# Patient Record
Sex: FEMALE
[De-identification: names, ages, dates, MRNs, and addresses within clinical notes are randomized per-mention and may not be internally consistent; named-entity substitution may affect disease eponyms.]

---

## 2017-10-02 NOTE — C.PDOC
History Of Present Illness





70 y/o female presents to emergency department, accompanied by daughter, status 

post fall just prior to arrival. Patient reportedly missed 2 steps while 

walking down stairs, falling onto her right side, and striking her head. 

Patient c/o right facial pain with hematoma, right shoulder pain, rib pain, and 

right knee pain. Daughter and patient deny LOC, chest pain, SOB, new weakness, 

new numbness, nausea, vomiting, or other associated symptoms. 





- HPI


Time Seen by Provider: 10/02/17 16:27


Chief Complaint (Nursing): Trauma


History Per: Patient, Family (daughter)


History/Exam Limitations: no limitations


Injury Occurred (Timing): Just Before Arrival


Location Of Injury: Right: Face, Knee, Leg, Shoulder


Recent travel outside of the [place]: No





Past Medical History


Reviewed: Historical Data, Nursing Documentation, Vital Signs


Vital Signs: 


 Last Vital Signs











Temp  97.9 F   10/02/17 19:15


 


Pulse  58 L  10/02/17 19:15


 


Resp  18   10/02/17 19:15


 


BP  131/76   10/02/17 19:15


 


Pulse Ox  99   10/02/17 19:20














- Medical History


PMH: HTN


Other PMH: Kidney Transplant


Family History: States: Unknown Family Hx





- Social History


Hx Alcohol Use: No


Hx Substance Use: No





- Immunization History


Hx Tetanus Toxoid Vaccination: No


Hx Influenza Vaccination: No


Hx Pneumococcal Vaccination: No





Review Of Systems


Except As Marked, All Systems Reviewed And Found Negative.


Constitutional: Negative for: Fever, Chills


ENT: Positive for: Other (right sided facial pain)


Cardiovascular: Negative for: Chest Pain, Palpitations


Respiratory: Negative for: Cough, Shortness of Breath, Wheezing


Gastrointestinal: Negative for: Nausea, Vomiting, Abdominal Pain


Musculoskeletal: Positive for: Shoulder Pain (R), Leg Pain (R), Other (Rib Pain

, R)


Skin: Negative for: Rash


Neurological: Negative for: Weakness, Numbness, Dizziness





Physical Exam





- Physical Exam


Appears: Non-toxic, Other (in moderate painful distress)


Skin: Warm, Dry, Other ((+) 1cm laceration above the right eyebrow)


Head: Normacephalic, Tenderness, Other (Right orbital ecchymosis and swelling)


Eye(s): bilateral: PERRL, EOMI


Ear(s): Bilateral: Normal


Nose: Normal, No Epistaxis, No Deformity


Oral Mucosa: Moist


Tongue: Normal Appearing


Lips: Normal Appearing


Teeth: Normal Dentition, Dentures


Throat: No Erythema, No Exudate


Neck: Paracervical Tenderness, No Step Off Deformity, Supple


Chest: Symmetrical, No Deformity, Tenderness (bilateral anterior 5th and 6th 

rib tenderness)


Cardiovascular: Rhythm Regular, No Friction Rub, No Murmur


Respiratory: Normal Breath Sounds, No Rales, No Rhonchi, No Wheezing


Gastrointestinal/Abdominal: Bowel Sounds (active), Soft, No Tenderness, No 

Distention, No Guarding, No Rebound


Back: Normal Inspection, No Vertebral Tenderness, No Paraspinal Tenderness


Extremity: Capillary Refill (< 2 sec. ), No Deformity, Other (large area of 

ecchymosis, swelling, and tenderness to right medial knee)


Extremity: Bilateral: Pelvis-Stable


Pulses: Left Radial: Normal, Right Radial: Normal, Left Dorsalis Pedis: Normal, 

Right Dorsalis Pedis: Normal


Neurological/Psych: Oriented x3, Normal Speech, Normal Cognition, Normal Motor, 

Normal Sensation


Gait: Steady





ED Course And Treatment


ECG: Interpreted By Me, Viewed By Me


ECG Rhythm: Sinus Rhythm, R BBB


Rate From EC


O2 Sat by Pulse Oximetry: 99 (RA)


Pulse Ox Interpretation: Normal





- CT Scan/US


  ** CT Head


Other Rad Studies (CT/US): Read By Radiologist, Radiology Report Reviewed


CT/US Interpretation: FINDINGS:  HEMORRHAGE:  No intracranial hemorrhage.  BRAIN

:  No mass effect or edema.  Atrophy and chronic microvascular white matter 

ischemic changes are noted.  VENTRICLES:  Unremarkable. No hydrocephalus.  

CALVARIUM:  Unremarkable.  PARANASAL SINUSES:  Unremarkable as visualized. No 

significant inflammatory changes.  MASTOID AIR CELLS:  Unremarkable as 

visualized. No inflammatory changes.  OTHER FINDINGS:  None.  IMPRESSION:  No 

evidence of acute intracranial hemorrhage intracranial collection mass effect 

or midline shift.  Atrophy and chronic microvascular white matter ischemic 

changes noted. No evidence of skull fracture.





  ** CT Maxillofacial


Other Rad Studies (CT/US): Read By Radiologist, Radiology Report Reviewed


CT/US Interpretation: FINDINGS:  NASAL BONES:  Unremarkable.  ORBITS:  Right 

periorbital soft tissue swelling and subcutaneous stranding seen.  PARANASAL 

SINUSES/ MASTOIDS:  Clear.  MAXILLA:  Unremarkable.  MANDIBLE/ 

TEMPOROMANDIBULAR JOINTS:  Unremarkable.  SKULL BASE:  Unremarkable.  TEMPORAL 

BONES:  Middle ears and mastoid grossly unremarkable.  OTHER FINDINGS:  There 

is round high density structure at the skin above the right hilum in the right 

frontal scalp may represent foreign body seen on image 111 series 3.  IMPRESSION

:  No evidence of acute fracture.  Moderate right periorbital soft tissue 

swelling.  2 millimeter high density structure noted at the skin in the right 

frontal scalp above the right orbit may represent foreign body best seen on 

image 111 series 3.





  ** CT Cervical Spine


Other Rad Studies (CT/US): Read By Radiologist, Radiology Report Reviewed


CT/US Interpretation: FINDINGS:  VERTEBRAE:  There is mild compression 

deformity of C3.  There is complete fusion of the C3 and C4. Mild kyphosis seen 

at the upper cervical spine. There is mild anterior spondylolisthesis of C2 

relative to C3. There is mild approximately 3 millimeter anterior 

spondylolisthesis of C7 relative to T1. There is a straightening of the 

cervical spine and mild reversal of the normal lordosis. Diffuse osteopenia is 

also noted.  DISCS/SPINAL CANAL/NEURAL FORAMINA:  Advanced degenerative disc 

changes. Multilevel severe narrowing of the intervertebral disc space noted at 

the cervical spine.  PARASPINAL SOFT TISSUES:  There are bilateral 

retropharyngeal internal carotid arteries noted.  OTHER FINDINGS:  None.  

IMPRESSION:  No definite evidence of acute fracture at the cervical spine.  

Compression deformity and fusion of C3 and C4 noted.  Anterior 

spondylolisthesis of C2 relative to C3.  Reversal of the normal lordosis and 

mild kyphosis seen at the upper cervical spine of uncertain etiology.  Advanced 

degenerative disc changes. If clinically warranted further assessment by MRI 

may be obtained.


Progress Note: CT cervical spine, CT Head, and CT maxillofacial scans ordered. 

Right knee and right shoulder x-rays ordered and reviewed. EKG ordered. Treated 

with Tylenol 650mg PO.  On re-evaluation, patient is resting and notes she is 

still in pain. Patient was given Tramadol.





Laceration





- Laceration Repair


  ** Above right eyebrow


Wound Length (In cm): 1 cm


Description Of Wound: Linear


Wound Cleansed With: Betadine, Sterile Saline


Wound Examination: Irrigated With Saline, No FB With Wound Exploration, No 

Tendon Injury With Wound Exploration


Wound Debridement/Revision: Wound Debrided, Wound Margins Revised


Wound Closure: Steri Strips (2), Skin Glue


Suture Technique And Material Used: Running


Wound Complexity: Simple





Medical Decision Making


Medical Decision Making: 


On re-eval, the patient reports improvement of symptoms. Lungs are CTA, heart 

is RRR. Abdomen is soft, non-tender and the patient is tolerating PO well.  

Patient has walker at home which she will use. Follow up with the medical doctor

/clinic within 1-2 days. Return if worsened. 





Disposition





- Disposition


Referrals: 


Jabier Herbert MD [Staff Provider] - 


Disposition: HOME/ ROUTINE


Disposition Time: 19:14


Condition: FAIR


Additional Instructions: 


Follow up with the medical doctor/clinic within 1-2 days without fail. Return 

if worsened.





Prescriptions: 


Acetaminophen [Tylenol] 325 mg PO Q6 PRN #30 tab


 PRN Reason: Pain, Moderate (4-7)


traMADol [Ultram] 50 mg PO Q6 PRN #20 tab


 PRN Reason: Pain


Instructions:  Cervical Strain (DC), Head Injury (ED), Knee Pain (ED), Rib 

Contusion (ED)


Forms:  [facility] (English)





- Clinical Impression


Clinical Impression: 


 Head injury, Rib contusion, Cervical strain, Knee contusion, Laceration of 

eyebrow








- PA / NP / Resident Statement


MD/DO has reviewed & agrees with the documentation as recorded.





- Scribe Statement


The provider has reviewed the documentation as recorded by the Dioni Gutierrez





All medical record entries made by the Dioni were at my direction and 

personally dictated by me. I have reviewed the chart and agree that the record 

accurately reflects my personal performance of the history, physical exam, 

medical decision making, and the department course for this patient. I have 

also personally directed, reviewed, and agree with the discharge instructions 

and disposition.

## 2017-10-02 NOTE — CT
PROCEDURE:  CT MAXILLOFACIAL BONES WITHOUT CONTRAST



HISTORY:

fall, swelling to R eye



COMPARISON:

None



TECHNIQUE:

Contiguous axial CT  images of the maxillofacial bones were obtained. 

Coronal and sagittal reformats were generated.



Radiation dose:



Total exam DLP = 720.78 mGy-cm.



This CT exam was performed using one or more of the following dose 

reduction techniques: Automated exposure control, adjustment of the 

mA and/or kV according to patient size, and/or use of iterative 

reconstruction technique.



FINDINGS:



NASAL BONES:

Unremarkable.



ORBITS:

Right periorbital soft tissue swelling and subcutaneous stranding 

seen.



PARANASAL SINUSES/ MASTOIDS:

Clear.



MAXILLA:

Unremarkable.



MANDIBLE/ TEMPOROMANDIBULAR JOINTS:

Unremarkable.



SKULL BASE:

Unremarkable.



TEMPORAL BONES:

Middle ears and mastoid grossly unremarkable.



OTHER FINDINGS:

There is round high density structure at the skin above the right 

hilum in the right frontal scalp may represent foreign body seen on 

image 111 series 3



IMPRESSION:

No evidence of acute fracture.



Moderate right periorbital soft tissue swelling.



2 millimeter high density structure noted at the skin in the right 

frontal scalp above the right orbit may represent foreign body best 

seen on image 111 series 3.

## 2017-10-02 NOTE — CT
PROCEDURE:  CT HEAD WITHOUT CONTRAST.



HISTORY:

fall , head injury



COMPARISON:

Comparison is made to the previous study dated 11/16/2012. 



TECHNIQUE:

Axial computed tomography images were obtained through the head/brain 

without intravenous contrast.  



Radiation dose:



Total exam DLP = 863.77 mGy-cm.



This CT exam was performed using one or more of the following dose 

reduction techniques: Automated exposure control, adjustment of the 

mA and/or kV according to patient size, and/or use of iterative 

reconstruction technique.



FINDINGS:



HEMORRHAGE:

No intracranial hemorrhage. 



BRAIN:

No mass effect or edema.  Atrophy and chronic microvascular white 

matter ischemic changes are noted.



VENTRICLES:

Unremarkable. No hydrocephalus. 



CALVARIUM:

Unremarkable.



PARANASAL SINUSES:

Unremarkable as visualized. No significant inflammatory changes.



MASTOID AIR CELLS:

Unremarkable as visualized. No inflammatory changes.



OTHER FINDINGS:

None.



IMPRESSION:

No evidence of acute intracranial hemorrhage intracranial collection 

mass effect or midline shift.  Atrophy and chronic microvascular 

white matter ischemic changes noted. No evidence of skull fracture.

## 2017-10-02 NOTE — CT
PROCEDURE:  CT Cervical Spine without contrast



HISTORY:

<neck injury>



COMPARISON:

None available.



TECHNIQUE:

Axial computed tomography images were obtained of the cervical spine 

without the use of intravenous contrast. Coronal and sagittal 

reformatted images were created and reviewed.



Radiation dose: 



Total exam DLP = 577.35 mGy-cm.



This CT exam was performed using one or more of the following dose 

reduction techniques: Automated exposure control, adjustment of the 

mA and/or kV according to patient size, and/or use of iterative 

reconstruction technique.



FINDINGS:



VERTEBRAE:

There is mild compression deformity of C3.  There is complete fusion 

of the C3 and C4. Mild kyphosis seen at the upper cervical spine. 

There is mild anterior spondylolisthesis of C2 relative to C3. There 

is mild approximately 3 millimeter anterior spondylolisthesis of C7 

relative to T1. There is a straightening of the cervical spine and 

mild reversal of the normal lordosis. Diffuse osteopenia is also 

noted. 



DISCS/SPINAL CANAL/NEURAL FORAMINA:

Advanced degenerative disc changes. Multilevel severe narrowing of 

the intervertebral disc space noted at the cervical spine.



PARASPINAL SOFT TISSUES:

There are bilateral retropharyngeal internal carotid arteries noted. 



OTHER FINDINGS:

None.



IMPRESSION:

No definite evidence of acute fracture at the cervical spine.



Compression deformity and fusion of C3 and C4 noted.  Anterior 

spondylolisthesis of C2 relative to C3.



Reversal of the normal lordosis and mild kyphosis seen at the upper 

cervical spine of uncertain etiology.



Advanced degenerative disc changes. If clinically warranted further 

assessment by MRI may be obtained.

## 2017-10-03 NOTE — RAD
PROCEDURE:  Radiographs of the Right Shoulder



HISTORY:

shoulder pain, injury







COMPARISON:

No prior.



FINDINGS:



BONES:

No fracture. High-riding humeral head- consistent chronic rotator 

cuff pathology noted.  Humeral head subchondral sclerosis 



JOINTS:

. Glenohumeral and acromioclavicular osteoarthritis.



SOFT TISSUES:

Normal.



OTHER FINDINGS:

None.



IMPRESSION:

No fracture or dislocation appreciated.



High-riding humeral head consistent chronic rotator cuff pathology. 

Sclerotic and hypertrophic arthrosis -right shoulder 



If clinical symptoms persist, consider MRI or CT of the right 

shoulder

## 2017-10-03 NOTE — RAD
HISTORY:

chest injury  



COMPARISON:

02/01/2013 



FINDINGS:



LUNGS:

Mild diffuse increased interstitial lung markings.  Nodular densities 

in the right hilar and infrahilar region as well as within the left 

hilar region may represent prominent vessels on end.  Additional 

considerations may include small granulomas and or nodules. These 

appear similar in appearance to the prior study.



PLEURA:

No significant pleural effusion identified, no pneumothorax apparent.



CARDIOVASCULAR:

Normal.



OSSEOUS STRUCTURES:

Postsurgical changes in the spine. Prominent degenerative changes in 

the shoulders. 



VISUALIZED UPPER ABDOMEN:

Normal.



OTHER FINDINGS:

Left axillary stent in place.



IMPRESSION:





Mild diffuse increased interstitial lung markings.  Nodular densities 

in the right hilar and infrahilar region as well as within the left 

hilar region may represent prominent vessels on end.  Additional 

considerations may include small granulomas and or nodules. These 

appear similar in appearance to the prior study.

## 2017-10-03 NOTE — RAD
PROCEDURE:  Right Knee Radiographs.



HISTORY:

knee injury, fall, swelling



COMPARISON:

None.



FINDINGS:



BONES:

No fracture or dislocation. Medial femoral tibial and patellofemoral 

joint space narrowing.  Medial tibial knee joint line spurring and 

medial tibial plateau subchondral sclerosis. 



JOINTS:

 osteoarthritis. 



JOINT EFFUSION:

Mild moderate present 



OTHER FINDINGS:

Probable ossific debris ring lateral femoral condyle. Arterial 

vascular calcifications. Tear subcutaneous calcifications possible 

phleboliths.  Anterior subcutaneous edema. Medial and lateral 

subcutaneous edema.



IMPRESSION:

No fracture or dislocation.  Prepatellar subcutaneous edema 



Osteoarthrosis.  Probable degenerative ossific debris. For patellar 

joint effusion 



Atherosclerotic vascular disease

## 2017-10-04 NOTE — CARD
--------------- APPROVED REPORT --------------





EKG Measurement

Heart [id number]

ND [id number]

[id number]

[id number]

[id number]



<Conclusion>

Sinus rhythm with premature atrial complexes

Right bundle branch block

Abnormal ECG

## 2017-10-05 NOTE — RAD
PROCEDURE:  Radiographs of the Right Shoulder



HISTORY:

TRAUMA







COMPARISON:

No prior.



FINDINGS:



BONES:

. No fracture. Superior acromial cysts hypertrophy.  High-riding 

humeral head -chronic rotator cuff pathology inferred 



JOINTS:

Glenohumeral and acromioclavicular and cervical apophyseal 

osteoarthritis.



SOFT TISSUES:

Normal.



OTHER FINDINGS:

Neuro stimulator electrodes project over mid-inferior thoracic spine



IMPRESSION:

No fracture or dislocation. . Arthrosis.

## 2017-10-05 NOTE — RAD
PROCEDURE:  CHEST RADIOGRAPH, 1 VIEW



HISTORY:

TRAUMA



COMPARISON:

10/2/2017



FINDINGS:



LUNGS:

No consolidation.



PLEURA:

No pneumothorax or pleural fluid seen.



CARDIOVASCULAR:

Cardiomegaly.  Aortic knob atherosclerotic vascular calcification.  

Central pulmonary vasculature possibly minimally congested -yet 

similar







OSSEOUS STRUCTURES:

Bilateral shoulder arthrosis, generalized osteopenia. No gross 

fracture appreciated 



VISUALIZED UPPER ABDOMEN:

Normal.



OTHER FINDINGS:

Left axillary vascular stent.  Mid thoracic level neurostimulator 

electrodes.  Similar mammillated right hemidiaphragm. 



IMPRESSION:

No gross fracture or dislocation appreciated. Generalized osteopenia 

. Bilateral shoulder arthrosis



Cardiomegaly.  Atherosclerotic arterial vascular disease. Borderline 

pulmonary central venous congestion - similar

## 2017-10-05 NOTE — CT
PROCEDURE:  CT HEAD WITHOUT CONTRAST.



HISTORY:

REPEAT trauma



COMPARISON:

10/2/2017 



TECHNIQUE:

Axial computed tomography images were obtained through the head/brain 

without intravenous contrast.  



Radiation dose:



Total exam DLP = 983 mGy-cm.



This CT exam was performed using one or more of the following dose 

reduction techniques: Automated exposure control, adjustment of the 

mA and/or kV according to patient size, and/or use of iterative 

reconstruction technique.



FINDINGS:



HEMORRHAGE:

No intracranial hemorrhage. 



BRAIN:

No mass effect or edema.  . Atrophy or chronic microvascular ischemic 

changes -similar-appearing.



VENTRICLES:

Unremarkable. No hydrocephalus. 



CALVARIUM:

Unremarkable.



PARANASAL SINUSES:

Unremarkable as visualized. No significant inflammatory changes.



MASTOID AIR CELLS:

Unremarkable as visualized. No inflammatory changes.



OTHER FINDINGS:

Vertebrobasilar arterial atherosclerotic vascular calcifications .



IMPRESSION:

No interval intracranial hemorrhage or mass effect. No interval 

calvarial fracture.  



Atrophy or chronic microvascular ischemic changes -similar-appearing.

## 2017-10-05 NOTE — C.PDOC
History Of Present Illness


72 Y/O FEMALE, S/P FALL OUT OF HER BED THIS MORNING, BROUGHT TO ER BY DAUGHTER. 

PT WAS ASSISTED BY FAMILY DOWN A FLIGHT OF STAIRS AND INTO HER BED AROUND 02:00 

THIS MORNING. DAUGHTER STATES, SHE FOUND THE PT LYING FACE DOWN, UNCONSCIOUS, 

ON THE FLOOR WITH HER RIGHT ARM WAS EXTENDED BACKWARDS AROUND 04:30 TODAY. 

DAUGHTER NOTES PT WAS DIFFICULT TO AROUSE BUT WAS RESPONSIVE AFTER SHAKING HER. 

ON ARRIVAL, PT STATES SHE GOT OUT OF BED ON HER OWN TO TRY TO GO TO THE 

BATHROOM PRIOR TO FALL. DAUGHTER STATES PT AT BASELINE, STATUS POST FALL ON 10/2

/17. DAUGHTER REPORTS CURRENT INJURIES WERE FROM PRIOR FALL AND DAUGHTER DENIES 

SEEING ANY NEW INJURIES. PT WAS AMBULATORY AT HOME PRIOR TO ARRIVAL. PT 

NORMALLY USES A CANE. ON ARRIVAL, PT'S MAINLY C/O DIFFUSE CHEST PAIN FROM PRIOR 

FALL AND C/O RIGHT SHOULDER PAIN FROM FALL THIS MORNING. DAUGHTER REPORTS SHE 

HAS BEEN ACTIVE IN THE HOUSE SINCE PRIOR ER VISIT. DENIES FEVER, CHILLS, NAUSEA

, VOMITING. 











- HPI


Time Seen by Provider: 10/05/17 07:32


Chief Complaint (Nursing): Chest Pain


History Per: Patient


History/Exam Limitations: no limitations


Injury Occurred (Timing): Hours Ago: (5)


Location Of Injury: Right: Shoulder, Anterior: Chest


Recent travel outside of the [place]: No


Additional History Per: Family (DAUGHTER)





Past Medical History


Reviewed: Historical Data, Nursing Documentation, Vital Signs


Vital Signs: 


 Last Vital Signs











Temp  98.1 F   10/05/17 10:22


 


Pulse  71   10/05/17 10:22


 


Resp  20   10/05/17 10:22


 


BP  129/48 L  10/05/17 10:22


 


Pulse Ox  98   10/05/17 12:08














- Medical History


PMH: HTN


Family History: States: Unknown Family Hx





- Social History


Hx Alcohol Use: No


Hx Substance Use: No





- Immunization History


Hx Tetanus Toxoid Vaccination: No


Hx Influenza Vaccination: No


Hx Pneumococcal Vaccination: No





Review Of Systems


Except As Marked, All Systems Reviewed And Found Negative.


Constitutional: Negative for: Fever, Chills


Cardiovascular: Negative for: Chest Pain


Respiratory: Negative for: Cough, Shortness of Breath, Wheezing


Gastrointestinal: Negative for: Nausea, Vomiting, Abdominal Pain


Musculoskeletal: Positive for: Shoulder Pain (R), Other (CHEST WALL PAIN)


Skin: Positive for: Bruising (R LOWER EXTREMITY, FACE R>L).  Negative for: Rash


Neurological: Negative for: Weakness, Numbness, Confusion, Headache, Dizziness





Physical Exam





- Physical Exam


Appears: Non-toxic, No Acute Distress


Skin: Warm, Dry


Head: Normacephalic, No Abrasion, No Laceration, Other (OLD BRUISING TO FACE, R 

> L )


Eye(s): bilateral: PERRL, EOMI, right: Other (SUBCONJUNCTIVAL HEMMORHAGE)


Ear(s): Bilateral: Normal


Nose: Normal


Oral Mucosa: Moist


Neck: Normal ROM, No Midline Cervical Tenderness, No Paracervical Tenderness, 

No Step Off Deformity, Supple


Chest: Symmetrical, Tenderness (DIFFUSE CHEST WALL TENDERNESS), Other (NO 

CREPITUS)


Cardiovascular: Rhythm Regular


Respiratory: Normal Breath Sounds (LUNGS CTA), No Accessory Muscle Use, No Rales

, No Rhonchi, No Wheezing


Gastrointestinal/Abdominal: Soft, No Tenderness, No Guarding, No Rebound


Back: Normal Inspection, No Vertebral Tenderness


Extremity: Normal ROM, Capillary Refill (< 2 SEC.), No Deformity, Other (OLD 

BRUISING RIGHT LOWER EXTREMITY)


Neurological/Psych: Oriented x3, Normal Speech, Normal Cognition





ED Course And Treatment





- Laboratory Results


Result Diagrams: 


 10/05/17 07:59





 10/05/17 07:59


Interpretation Of Abnormal: BUN/CREAT WORSE COMPARED TO PRIOR


ECG: Interpreted By Me


ECG Interpretation: No Changes From Prior (COMPARED WITH 10/2/17)


Interpretation Of ECG: SINUS RHYTHM WITH PREMATURE ATRIAL COMPLEXES. RIGHT 

BUNDLE BRANCH BLOCK.


Rate From EC (BPM)


O2 Sat by Pulse Oximetry: 98 (RA)


Pulse Ox Interpretation: Normal





- Radiology


CXR: Interpreted by Me


CXR Interpretation: Yes: No Acute Disease.  No: Infiltrates





- Other Rad


  ** R SHOULDER X-RAY


X-Ray: Interpreted by Me


Interpretation: NEGATIVE FOR FX/DISLOCATION





  ** PELVIS


X-Ray: Interpreted by Me (NEG)





- CT Scan/US


  ** CT CHEST


Other Rad Studies (CT/US): Read By Radiologist, Radiology Report Reviewed


CT/US Interpretation: FINDINGS:  LUNGS:  Clear lungs. Visualized airway clear. 

Trace fibrotic changes in the periphery bilaterally at the upper lobes.  

Limited bilateral basilar dependent atelectasis is appreciated.  MEDIASTINUM:  

Mildly atherosclerotic thoracic aorta. No aneurysm. Cardiomegaly with trace 

pericardial effusion identified inferiorly. Main pulmonary artery unremarkable. 

No vascular congestion. No lymphadenopathy.  PLEURA:  No pleural fluid. No 

pneumothorax.  BONES:  No fracture. No destructive lesion.  UPPER ABDOMEN:  

There is a hypodense lesion identified in the left lobe liver which appears 

irregular and peripheral margins and may even be infiltrative laterally toward 

the left measuring a minimum of 3.6 cm.  The largest component of this lesion 

appears to be cystic measuring only 3-4 Hounsfield units. Given the lack of 

prior imaging and its irregular peripheral margins follow-up CT or MRI without 

contrast is advised for better characterization.  OTHER FINDINGS:  An epidural 

stimulator is appreciated terminating at the mid thoracic spine and note is 

also made incidentally of a a wall stent at the left axilla/proximal upper 

extremity.  IMPRESSION:  1. No fracture, pleural effusion, pneumothorax or per 

definite pulmonary contusion is appreciable.  2. Trace pericardial effusion. 

Moderate cardiomegaly noted.  3. Limited bilateral basilar dependent 

atelectasis.  4. Incidental note is made of a 3.6 cm irregular hyperdensity at 

the left lobe liver with cystic components. This is unlikely to be 

posttraumatic. Follow-up abdomen pelvis CT or abdomen MRI without contrast is 

advised for greater characterization of this finding when feasible.  5. Other 

lesser findings as discussed above.





  ** CT HEAD


Other Rad Studies (CT/US): Read By Radiologist, Radiology Report Reviewed


CT/US Interpretation: IMPRESSION:  No interval intracranial hemorrhage or mass 

effect. No interval calvarial fracture.  Atrophy or chronic microvascular 

ischemic changes -similar-appearing.





Progress





- Re-Evaluation


Re-evaluation Note: 





10/05/17 08:06


EKG, CXR, BLOODWORK. PELVIS, R SHOULDER X-RAYS ORDERED. LIDODERM PATCH ORDERED. 


10/05/17 10:50


APPEARS COMFORTABLE NAD. 


PENDING CALLBACK PMD





10/05/17 11:15


NO RESPONSE PMD. 2ND CALL TO SERVICE





10/05/17 12:06


NO RESPONSE PMD SINCE 1050





D/W MED ON CALL DR PEREZ WILL ADMIT





- Data Reviewed


Data Reviewed: Lab, Diagnostic imaging, EKG, Old records





Disposition


Counseled Patient/Family Regarding: Studies Performed, Diagnosis





- Disposition


Disposition: HOSPITALIZED


Disposition Time: 12:07


Condition: STABLE


Forms:  [facility] (English)





- POA


Present On Arrival: Falls Or Trauma





- Clinical Impression


Clinical Impression: 


 Chest pain, Multiple contusions, Renal insufficiency








- Scribe Statement


The provider has reviewed the documentation as recorded by the Scribe





SM


All medical record entries made by the Scribe were at my direction and 

personally dictated by me. I have reviewed the chart and agree that the record 

accurately reflects my personal performance of the history, physical exam, 

medical decision making, and the department course for this patient. I have 

also personally directed, reviewed, and agree with the discharge instructions 

and disposition.





Decision To Admit





- Pt Status Changed To:


Hospital Disposition Of: Observation





- .


Bed Request Type: Telemetry


Admitting Physician: James Perez Jr.


Patient Diagnosis: 


 Chest pain, Multiple contusions, Renal insufficiency

## 2017-10-05 NOTE — CARD
--------------- APPROVED REPORT --------------





EKG Measurement

Heart [id number]

IN [id number]

[id number]

[id number]

[id number]



<Conclusion>

Sinus rhythm with premature atrial complexes

Right bundle branch block

Abnormal ECG

## 2017-10-05 NOTE — RAD
PROCEDURE:  Radiographs of the pelvis.



HISTORY:

PAIN



COMPARISON:

None.



FINDINGS:



BONES:

Pelvic Bones: Unremarkable.



Hips: Bilateral arthrosis



JOINTS:

Sacroiliac Joints: Unremarkable.



Pubic Symphysis: Unremarkable.



OTHER FINDINGS:

Status post lumbar laminectomy with hardware.  Neurostimulator 

battery pack and leads. Large body habitus 



IMPRESSION:

No fracture or dislocation. 



Lumbar laminectomy with hardware fusion.



- 



Bilateral hip arthrosis

## 2017-10-05 NOTE — CT
PROCEDURE:  CT Chest without contrast



HISTORY:

trauma



COMPARISON:

None.



TECHNIQUE:

Contiguous axial images were obtained through the chest without 

intravenous contrast enhancement. Sagittal and coronal 

reconstructions were performed.







Radiation dose (DLP): 766.57 mGy-cm. 



This CT exam was performed using one or more of the following dose 

reduction techniques: Automated exposure control, adjustment of the 

mA and/or kV according to patient size, and/or use of iterative 

reconstruction technique.



FINDINGS:



LUNGS:

Clear lungs. Visualized airway clear. Trace fibrotic changes in the 

periphery bilaterally at the upper lobes.  Limited bilateral basilar 

dependent atelectasis is appreciated. 



MEDIASTINUM:

Mildly atherosclerotic thoracic aorta. No aneurysm. Cardiomegaly with 

trace pericardial effusion identified inferiorly. Main pulmonary 

artery unremarkable. No vascular congestion. No lymphadenopathy.



PLEURA:

No pleural fluid. No pneumothorax.



BONES:

No fracture. No destructive lesion. 



UPPER ABDOMEN:

There is a hypodense lesion identified in the left lobe liver which 

appears irregular and peripheral margins and may even be infiltrative 

laterally toward the left measuring a minimum of 3.6 cm.  The largest 

component of this lesion appears to be cystic measuring only 3-4 

Hounsfield units. Given the lack of prior imaging and its irregular 

peripheral margins follow-up CT or MRI without contrast is advised 

for better characterization. 



OTHER FINDINGS:

An epidural stimulator is appreciated terminating at the mid thoracic 

spine and note is also made incidentally of a a wall stent at the 

left axilla/proximal upper extremity.



IMPRESSION:

1. No fracture, pleural effusion, pneumothorax or per definite 

pulmonary contusion is appreciable. 



2. Trace pericardial effusion. Moderate cardiomegaly noted. 



3. Limited bilateral basilar dependent atelectasis. 



4. Incidental note is made of a 3.6 cm irregular hyperdensity at the 

left lobe liver with cystic components. This is unlikely to be 

posttraumatic. Follow-up abdomen pelvis CT or abdomen MRI without 

contrast is advised for greater characterization of this finding when 

feasible. 



5. Other lesser findings as discussed above.

## 2017-10-05 NOTE — CARD
--------------- APPROVED REPORT --------------





EXAM: Two-dimensional and M-mode echocardiogram with Doppler and 

color Doppler.



Other Information 

Quality : GoodRhythm : NSR



INDICATION

Chest Pain Syncope 



RISK FACTORS

Hypertension 



2D DIMENSIONS 

IVSd0.9   (0.7-1.1cm)LVDd4.6   (3.9-5.9cm)

LVOT Diameter1.9   (1.8-2.4cm)PWd1.1   (0.7-1.1cm)

LVDs2.6   (2.5-4.0cm)FS (%) 44.4   %

LVEF (%)75.7   (>50%)



M-Mode DIMENSIONS 

Left Atrium (MM)3.61   (2.5-4.0cm)Aortic Root3.09   (2.2-3.7cm)

Aortic Cusp Exc.1.09   (1.5-2.0cm)



Aortic Valve

AoV Peak Bnmltwxy061.8cm/sAoV VTI59.8cmAO Peak GR.33mmHg

LVOT Peak Fsmlyssv322.9cm/sLVOT VTI28.98cmAO Mean GR.20mmHg

MARCIE (VMAX)1.36xn7GYZ (VTI)1.44cm2



Mitral Valve

MV E Bwdrvomn131.9cm/sMV A Mwhtkfsq307.0cm/sE/A ratio1.0



TDI

E/Lateral E'0.0E/Medial E'0.0



Tricuspid Valve

TR Peak Njnxquco918bu/sTR Peak Gr.21haInRQJT77dnXc



 LEFT VENTRICLE 

The left ventricle is normal size.

There is normal left ventricular wall thickness.

The left ventricular function is normal.

The left ventricular ejection fraction is within the normal range.

There is normal LV segmental wall motion.

The left ventricular diastolic function is normal.



 ATRIA 

The right atrium size is normal.



 AORTIC VALVE 

There is mild valvular aortic stenosis.



 MITRAL VALVE 

There is mild mitral valve stenosis.

Mitral regurgitation is trace.



 TRICUSPID VALVE 

There is mild tricuspid regurgitation.



<Conclusion>

Normal LV systolic function.

Normal chamber size.

Mild AS.

Mild MS.

Trace MR.

Mild TR.

## 2017-10-05 NOTE — CP.PCM.HP
History of Present Illness





- History of Present Illness


History of Present Illness: 











CC:  Fall, Chest pain, 





HPI:  Patient is a 71 year old female with a history of HTN, hyperlipidemia, 

Kidney transplant is here because she was found on the floor by her daughter 

whom she lives.  Patient's daughter says the patient was unconscious and was 

unable to arouse patient for about 30 units.  Patient does not remember why she 

was on the floor not does she remember falling.  She says she was been having 

right sided chest pain, shoulder pain, and right lower extremity pain and 

weakness since falling 3 days ago.  She has extensive bruising on the right 

side of her face and legs.  She denies any changes in vision, hearing, feeling 

lightheaded, nausea, vomiting, diarrhea, difficulty breathing, wheezing, 

palpitations, anxiety, depression, numbness tingling, or memory loss.   She was 

seen in the ED about 3 days ago for a fall, they did x:rays of knee, shoulder, 

head and cervical spine CT which were negative.  





PMH:  see above


PSH:  left kidney transplant, 2 lumbar laminectomy 


PFH:  Denies family history of heart disease, Cancer, DM, or HTN


SH:  Lives with daughter, ambulates without assistant, denies smoking, etoh use

, or drug use





PMH:  Dr. Smith





Allergies: Amoxicillin 





Present on Admission





- Present on Admission


Any Indicators Present on Admission: Yes


History of DVT/PE: No


History of Uncontrolled Diabetes: No


Urinary Catheter: No


Decubitus Ulcer Present: No


History Surgical Site Infection Following: Orthopedic Procedures (lumbar spine)





Review of Systems





- Review of Systems


All systems: reviewed and no additional remarkable complaints except





- Constitutional


Constitutional: absent: Chills, Fever





- EENT


Eyes: absent: Change in Vision


Ears: absent: Dizziness





- Cardiovascular


Cardiovascular: Chest Pain, Syncope.  absent: Dyspnea, Palpitations, Radiating 

Pain





- Respiratory


Respiratory: absent: Cough, Dyspnea, Wheezing





- Gastrointestinal


Gastrointestinal: absent: Abdominal Pain, Constipation, Diarrhea, Nausea, 

Vomiting





- Genitourinary


Genitourinary: absent: Dysuria





- Musculoskeletal


Musculoskeletal: Limited Range of Motion, Muscle Weakness, Myalgias.  absent: 

Back Pain, Joint Swelling, Numbness, Tingling





- Neurological


Neurological: Syncope, Weakness.  absent: Convulsions, Dizziness, Numbness, 

Frequent Falls, Headaches, Memory Loss, Paresthesias, Sensory Deficit





- Psychiatric


Psychiatric: absent: Anxiety





- Endocrine


Endocrine: absent: Fatigue, Palpitations





Past Patient History





- Infectious Disease


Hx of Infectious Diseases: None





- Past Social History


Smoking Status: Never Smoked





- CARDIAC


Hx Hypertension: Yes





- RENAL


Other/Comment: Rt. kidney transplant, Aug. 3,2003





- PSYCHIATRIC


Hx Substance Use: No





- SURGICAL HISTORY


Hx Surgeries: Yes


Other/Comment: Rt. kidney transplant Aug. 3,2003





- ANESTHESIA


Hx Anesthesia: No


Hx Anesthesia Reactions: No





Meds


Allergies/Adverse Reactions: 


 Allergies











Allergy/AdvReac Type Severity Reaction Status Date / Time


 


amoxicillin AdvReac Severe  Verified 10/05/17 06:58














Physical Exam





- Constitutional


Appears: Non-toxic, No Acute Distress





- Eye Exam


Eye Exam: Normal appearance, PERRL.  absent: Nystagmus, Scleral icterus


Pupil Exam: NORMAL ACCOMODATION





- ENT Exam


ENT Exam: Mucous Membranes Moist, Normal Exam, Normal External Ear Exam, Normal 

Oropharynx, TM's Normal Bilaterally





- Neck Exam


Neck exam: Positive for: Normal Inspection





- Respiratory Exam


Respiratory Exam: Clear to Auscultation Bilateral.  absent: Rales, Rhonchi, 

Wheezes





- Cardiovascular Exam


Cardiovascular Exam: REGULAR RHYTHM, RRR, +S1, +S2.  absent: Gallop, Rubs





- GI/Abdominal Exam


GI & Abdominal Exam: Normal Bowel Sounds, Soft.  absent: Guarding, Rebound, 

Tenderness





- Extremities Exam


Extremities exam: Positive for: pedal edema, tenderness.  Negative for: normal 

inspection





- Back Exam


Back exam: absent: CVA tenderness (L), CVA tenderness (R)





- Neurological Exam


Neurological exam: Alert, Oriented x3


Additional comments: 





weaker on the right side both upper and lower extremity





- Psychiatric Exam


Psychiatric exam: Normal Affect, Normal Mood





- Skin


Skin Exam: Pallor


Additional comments: 





Bruising on the left and face





Results





- Vital Signs


Recent Vital Signs: 





 Last Vital Signs











Temp  98.3 F   10/05/17 12:41


 


Pulse  69   10/05/17 12:41


 


Resp  20   10/05/17 12:41


 


BP  125/62   10/05/17 12:41


 


Pulse Ox  100   10/05/17 12:41














- Labs


Result Diagrams: 


 10/05/17 07:59





 10/05/17 07:59


Labs: 





 Laboratory Results - last 24 hr











  10/05/17 10/05/17 10/05/17





  07:59 07:59 07:59


 


WBC  10.2  D  


 


RBC  3.51 L  


 


Hgb  9.6 L D  


 


Hct  28.8 L  


 


MCV  81.8  


 


MCH  27.4  


 


MCHC  33.4  


 


RDW  14.5  


 


Plt Count  148  


 


MPV  8.0  


 


Neut % (Auto)  77.1 H  


 


Lymph % (Auto)  11.7 L  


 


Mono % (Auto)  10.2 H  


 


Eos % (Auto)  0.2  


 


Baso % (Auto)  0.8  


 


Neut #  7.9 H  


 


Lymph #  1.2  


 


Mono #  1.0 H  


 


Eos #  0.0  


 


Baso #  0.1  


 


PT   11.9 


 


INR   1.1 


 


APTT   23 


 


Sodium    132


 


Potassium    3.7


 


Chloride    100


 


Carbon Dioxide    23


 


Anion Gap    14


 


BUN    46 H


 


Creatinine    1.8 H


 


Est GFR ( Amer)    33


 


Est GFR (Non-Af Amer)    28


 


Random Glucose    88


 


Calcium    8.5 L


 


Total Bilirubin    0.7


 


AST    33


 


ALT    31


 


Alkaline Phosphatase    35 L


 


Troponin I    0.0530


 


Total Protein    6.2 L


 


Albumin    3.4 L


 


Globulin    2.8


 


Albumin/Globulin Ratio    1.2


 


Urine Color   


 


Urine Clarity   


 


Urine pH   


 


Ur Specific Gravity   


 


Urine Protein   


 


Urine Glucose (UA)   


 


Urine Ketones   


 


Urine Blood   


 


Urine Nitrate   


 


Urine Bilirubin   


 


Urine Urobilinogen   


 


Ur Leukocyte Esterase   


 


Urine WBC (Auto)   


 


Urine RBC (Auto)   


 


Ur Squamous Epith Cells   


 


Blood Type   


 


Antibody Screen   














  10/05/17 10/05/17





  07:59 11:22


 


WBC  


 


RBC  


 


Hgb  


 


Hct  


 


MCV  


 


MCH  


 


MCHC  


 


RDW  


 


Plt Count  


 


MPV  


 


Neut % (Auto)  


 


Lymph % (Auto)  


 


Mono % (Auto)  


 


Eos % (Auto)  


 


Baso % (Auto)  


 


Neut #  


 


Lymph #  


 


Mono #  


 


Eos #  


 


Baso #  


 


PT  


 


INR  


 


APTT  


 


Sodium  


 


Potassium  


 


Chloride  


 


Carbon Dioxide  


 


Anion Gap  


 


BUN  


 


Creatinine  


 


Est GFR ( Amer)  


 


Est GFR (Non-Af Amer)  


 


Random Glucose  


 


Calcium  


 


Total Bilirubin  


 


AST  


 


ALT  


 


Alkaline Phosphatase  


 


Troponin I  


 


Total Protein  


 


Albumin  


 


Globulin  


 


Albumin/Globulin Ratio  


 


Urine Color   Yellow


 


Urine Clarity   Clear


 


Urine pH   5.0


 


Ur Specific Gravity   1.014


 


Urine Protein   1+ H


 


Urine Glucose (UA)   Normal


 


Urine Ketones   Negative


 


Urine Blood   1+ H


 


Urine Nitrate   Negative


 


Urine Bilirubin   Negative


 


Urine Urobilinogen   Normal


 


Ur Leukocyte Esterase   3+ H


 


Urine WBC (Auto)   12 H


 


Urine RBC (Auto)   8 H


 


Ur Squamous Epith Cells   1


 


Blood Type  A POSITIVE 


 


Antibody Screen  Negative 














Assessment & Plan


(1) Syncope and collapse


Assessment and Plan: 





Patient is admitted to tele/obs





Labs in the ED and EKG were reviewed





Will do cardiac enzymes Q8H x2, first set are negative, monitor on tele, echo, 

and carotid doppler





physical and occupational therapy





Fall precautions





follow up morning cbc, cmp, mag, phos, tsh/free t4, hgb a1c, lipid panel. 





Follow up orthostatic vital signs. 





Follow up CPK





Consider cardiology or Neurology consult. 


Status: Acute   Priority: High   





(2) Multiple contusions


Assessment and Plan: 


Lidoderm patch, Tylenol and Morphine Prn





Physical and occupational therapy, patient need to ADRIAN after discharge. 


Status: Acute   Priority: High   





(3) Anemia


Assessment and Plan: 


Hbg is 9.2, which is well below her baseline.  Will do anemia work up, Rectic 

count, iron studies, B12, and folate, and also stool ob x3.  


Status: Acute   Priority: High   





(4) UTI (urinary tract infection)


Assessment and Plan: 


UA showed +3 LE as wells aso RBCs and WBCs, started Cipro 200mg IV q12h due to 

renal insufficiency and Amoxicillin allergy.  


Follow up blood and urine culture. 


Status: Acute   Priority: High   





(5) HTN (hypertension)


Assessment and Plan: 


Norvasc 5mg Lopressor 50mg bid


Status: Chronic   Priority: Medium   





(6) Hyperlipidemia


Assessment and Plan: 


Statin, follow up lipid panel


also continue Lovaza


Status: Chronic   Priority: Medium   





(7) History of kidney transplant


Assessment and Plan: 


Patient is on several medication such as cylcosporine 75mg and 50mg HS, 

Cellcept 500mg bid, prednisone 5mg, and also Bactrim DS once a day for 

prophylaxis. 


Consulted patient's nephrologist Dr. Dozier


Status: Chronic   





(8) Prophylactic measure


Assessment and Plan: 


Will hold VTE due to anemia and recent head trauma , and also old SCD due to 

lower extremity injury


Pepcid 20mg daily. 


Status: Acute   Priority: Medium

## 2017-10-05 NOTE — CP.PCM.CON
History of Present Illness





- History of Present Illness


History of Present Illness: 





Patient admitted with syncope and LOC


ECHO: Normal


carotids: Pending





CC:  Fall, Chest pain, 





HPI:  Patient is a 71 year old female with a history of HTN, hyperlipidemia, 

Kidney transplant is here because she was found on the floor by her daughter 

whom she lives.  Patient's daughter says the patient was unconscious and was 

unable to arouse patient for about 30 units.  Patient does not remember why she 

was on the floor not does she remember falling.  She says she was been having 

right sided chest pain, shoulder pain, and right lower extremity pain and 

weakness since falling 3 days ago.  She has extensive bruising on the right 

side of her face and legs.  She denies any changes in vision, hearing, feeling 

lightheaded, nausea, vomiting, diarrhea, difficulty breathing, wheezing, 

palpitations, anxiety, depression, numbness tingling, or memory loss.   She was 

seen in the ED about 3 days ago for a fall, they did xrays of knee, shoulder, 

head and cervical spine CT which were negative.  





PMH:  see above


PSH:  left kidney transplant, 2 lumbar laminectomy 


PFH:  Denies family history of heart disease, Cancer, DM, or HTN


SH:  Lives with daughter, ambulates without assistant, denies smoking, etoh use

, or drug use





PMH:  Dr. Smith





Allergies: Amoxicillin 








Review of Systems





- Review of Systems


All systems: reviewed and no additional remarkable complaints except





- Constitutional


Constitutional: absent: Chills, Fever





- EENT


Eyes: absent: Change in Vision


Ears: absent: Dizziness





- Cardiovascular


Cardiovascular: Chest Pain, Syncope.  absent: Dyspnea, Palpitations, Radiating 

Pain





- Respiratory


Respiratory: absent: Cough, Dyspnea, Wheezing





- Gastrointestinal


Gastrointestinal: absent: Abdominal Pain, Constipation, Diarrhea, Nausea, 

Vomiting





- Genitourinary


Genitourinary: absent: Dysuria





- Musculoskeletal


Musculoskeletal: Limited Range of Motion, Muscle Weakness, Myalgias.  absent: 

Back Pain, Joint Swelling, Numbness, Tingling





- Neurological


Neurological: Syncope, Weakness.  absent: Convulsions, Dizziness, Numbness, 

Frequent Falls, Headaches, Memory Loss, Paresthesias, Sensory Deficit





- Psychiatric


Psychiatric: absent: Anxiety





- Endocrine


Endocrine: absent: Fatigue, Palpitations








Physical Exam





- Constitutional


Appears: Non-toxic, No Acute Distress





- Eye Exam


Eye Exam: Normal appearance, PERRL.  absent: Nystagmus, Scleral icterus


Pupil Exam: NORMAL ACCOMODATION





- ENT Exam


ENT Exam: Mucous Membranes Moist, Normal Exam, Normal External Ear Exam, Normal 

Oropharynx, TM's Normal Bilaterally





- Neck Exam


Neck exam: Positive for: Normal Inspection





- Respiratory Exam


Respiratory Exam: Clear to Auscultation Bilateral.  absent: Rales, Rhonchi, 

Wheezes





- Cardiovascular Exam


Cardiovascular Exam: REGULAR RHYTHM, RRR, +S1, +S2.  absent: Gallop, Rubs





- GI/Abdominal Exam


GI & Abdominal Exam: Normal Bowel Sounds, Soft.  absent: Guarding, Rebound, 

Tenderness





- Extremities Exam


Extremities exam: Positive for: pedal edema, tenderness.  Negative for: normal 

inspection





- Back Exam


Back exam: absent: CVA tenderness (L), CVA tenderness (R)





- Neurological Exam


Neurological exam: Alert, Oriented x3


Additional comments: 





weaker on the right side both upper and lower extremity





- Psychiatric Exam


Psychiatric exam: Normal Affect, Normal Mood





- Skin


Skin Exam: Pallor


Additional comments: 





Bruising on the left and face





Past Patient History





- Infectious Disease


Hx of Infectious Diseases: None





- Past Social History


Smoking Status: Never Smoked





- CARDIAC


Hx Hypertension: Yes





- RENAL


Other/Comment: Rt. kidney transplant, Aug. 3,2003





- MUSCULOSKELETAL/RHEUMATOLOGICAL


Hx Falls: Yes (Last Monday)





- PSYCHIATRIC


Hx Substance Use: No





- SURGICAL HISTORY


Hx Surgeries: Yes


Other/Comment: Rt. kidney transplant Aug. 3,2003





- ANESTHESIA


Hx Anesthesia: No


Hx Anesthesia Reactions: No





Meds


Allergies/Adverse Reactions: 


 Allergies











Allergy/AdvReac Type Severity Reaction Status Date / Time


 


amoxicillin AdvReac Severe  Verified 10/05/17 06:58














- Medications


Medications: 


 Current Medications





Acetaminophen (Tylenol 325mg Tab)  650 mg PO Q6 PRN


   PRN Reason: Pain, moderate (4-7)


Amlodipine Besylate (Norvasc)  5 mg PO DAILY [facility]


Cyclosporine (Cyclosporine)  50 mg PO HS [facility]


   Last Admin: 10/05/17 21:33 Dose:  50 mg


Cyclosporine (Cyclosporine)  75 mg PO DAILY [facility]


   Last Admin: 10/05/17 17:03 Dose:  Not Given


Famotidine (Pepcid)  20 mg PO DAILY [facility]


   Last Admin: 10/05/17 17:49 Dose:  20 mg


Ciprofloxacin (Cipro 200mg/100ml D5w)  100 mls @ 67 mls/hr IVPB Q12H [facility]


   Last Admin: 10/05/17 17:39 Dose:  67 mls/hr


Lidocaine (Lidoderm)  1 ea TD DAILY [facility]


Metoprolol Succinate (Toprol Xl)  50 mg PO BID [facility]


   Last Admin: 10/05/17 17:46 Dose:  50 mg


Morphine Sulfate (Morphine)  1 mg IVP Q4H PRN


   PRN Reason: Pain, severe (8-10)


   Last Admin: 10/05/17 17:35 Dose:  1 mg


Mycophenolate Mofetil (Cellcept)  500 mg PO BID [facility]


   Last Admin: 10/05/17 17:45 Dose:  500 mg


Omega-3-Acid Ethyl Esters (Lovaza)  1 gm PO BID [facility]


   Last Admin: 10/05/17 17:46 Dose:  1 gm


Prednisone (Prednisone Tab)  5 mg PO DAILY [facility]


   Last Admin: 10/05/17 17:02 Dose:  Not Given


Rosuvastatin Calcium (Crestor)  20 mg PO HS [facility]


   Last Admin: 10/05/17 21:32 Dose:  Not Given


Trimethoprim/Sulfamethoxazole (Bactrim Ss Tab)  1 tab PO DAILY [facility]


   Last Admin: 10/05/17 17:06 Dose:  Not Given











Results





- Vital Signs


Recent Vital Signs: 


 Last Vital Signs











Temp  98.4 F   10/05/17 15:45


 


Pulse  69   10/05/17 15:45


 


Resp  20   10/05/17 15:45


 


BP  141/63   10/05/17 15:45


 


Pulse Ox  99   10/05/17 15:45














- Labs


Result Diagrams: 


 10/07/17 07:18





 10/07/17 07:18


Labs: 


 Laboratory Results - last 24 hr











  10/05/17 10/05/17 10/05/17





  07:59 07:59 07:59


 


WBC  10.2  D  


 


RBC  3.51 L  


 


Hgb  9.6 L D  


 


Hct  28.8 L  


 


MCV  81.8  


 


MCH  27.4  


 


MCHC  33.4  


 


RDW  14.5  


 


Plt Count  148  


 


MPV  8.0  


 


Neut % (Auto)  77.1 H  


 


Lymph % (Auto)  11.7 L  


 


Mono % (Auto)  10.2 H  


 


Eos % (Auto)  0.2  


 


Baso % (Auto)  0.8  


 


Neut #  7.9 H  


 


Lymph #  1.2  


 


Mono #  1.0 H  


 


Eos #  0.0  


 


Baso #  0.1  


 


PT   11.9 


 


INR   1.1 


 


APTT   23 


 


Sodium    132


 


Potassium    3.7


 


Chloride    100


 


Carbon Dioxide    23


 


Anion Gap    14


 


BUN    46 H


 


Creatinine    1.8 H


 


Est GFR ( Amer)    33


 


Est GFR (Non-Af Amer)    28


 


Random Glucose    88


 


Calcium    8.5 L


 


Iron   


 


TIBC   


 


% Saturation   


 


Total Bilirubin    0.7


 


AST    33


 


ALT    31


 


Alkaline Phosphatase    35 L


 


Total Creatine Kinase   


 


Troponin I    0.0530


 


Total Protein    6.2 L


 


Albumin    3.4 L


 


Globulin    2.8


 


Albumin/Globulin Ratio    1.2


 


Urine Color   


 


Urine Clarity   


 


Urine pH   


 


Ur Specific Gravity   


 


Urine Protein   


 


Urine Glucose (UA)   


 


Urine Ketones   


 


Urine Blood   


 


Urine Nitrate   


 


Urine Bilirubin   


 


Urine Urobilinogen   


 


Ur Leukocyte Esterase   


 


Urine WBC (Auto)   


 


Urine RBC (Auto)   


 


Ur Squamous Epith Cells   


 


Blood Type   


 


Antibody Screen   














  10/05/17 10/05/17 10/05/17





  07:59 11:22 16:58


 


WBC   


 


RBC   


 


Hgb   


 


Hct   


 


MCV   


 


MCH   


 


MCHC   


 


RDW   


 


Plt Count   


 


MPV   


 


Neut % (Auto)   


 


Lymph % (Auto)   


 


Mono % (Auto)   


 


Eos % (Auto)   


 


Baso % (Auto)   


 


Neut #   


 


Lymph #   


 


Mono #   


 


Eos #   


 


Baso #   


 


PT   


 


INR   


 


APTT   


 


Sodium   


 


Potassium   


 


Chloride   


 


Carbon Dioxide   


 


Anion Gap   


 


BUN   


 


Creatinine   


 


Est GFR ( Amer)   


 


Est GFR (Non-Af Amer)   


 


Random Glucose   


 


Calcium   


 


Iron   


 


TIBC   


 


% Saturation   


 


Total Bilirubin   


 


AST   


 


ALT   


 


Alkaline Phosphatase   


 


Total Creatine Kinase   


 


Troponin I    0.0650


 


Total Protein   


 


Albumin   


 


Globulin   


 


Albumin/Globulin Ratio   


 


Urine Color   Yellow 


 


Urine Clarity   Clear 


 


Urine pH   5.0 


 


Ur Specific Gravity   1.014 


 


Urine Protein   1+ H 


 


Urine Glucose (UA)   Normal 


 


Urine Ketones   Negative 


 


Urine Blood   1+ H 


 


Urine Nitrate   Negative 


 


Urine Bilirubin   Negative 


 


Urine Urobilinogen   Normal 


 


Ur Leukocyte Esterase   3+ H 


 


Urine WBC (Auto)   12 H 


 


Urine RBC (Auto)   8 H 


 


Ur Squamous Epith Cells   1 


 


Blood Type  A POSITIVE  


 


Antibody Screen  Negative  














  10/05/17 10/05/17





  16:58 16:58


 


WBC  


 


RBC  


 


Hgb  


 


Hct  


 


MCV  


 


MCH  


 


MCHC  


 


RDW  


 


Plt Count  


 


MPV  


 


Neut % (Auto)  


 


Lymph % (Auto)  


 


Mono % (Auto)  


 


Eos % (Auto)  


 


Baso % (Auto)  


 


Neut #  


 


Lymph #  


 


Mono #  


 


Eos #  


 


Baso #  


 


PT  


 


INR  


 


APTT  


 


Sodium  


 


Potassium  


 


Chloride  


 


Carbon Dioxide  


 


Anion Gap  


 


BUN  


 


Creatinine  


 


Est GFR ( Amer)  


 


Est GFR (Non-Af Amer)  


 


Random Glucose  


 


Calcium  


 


Iron   13 L


 


TIBC   236 L


 


% Saturation   5 L


 


Total Bilirubin  


 


AST  


 


ALT  


 


Alkaline Phosphatase  


 


Total Creatine Kinase  586 H 


 


Troponin I  


 


Total Protein  


 


Albumin  


 


Globulin  


 


Albumin/Globulin Ratio  


 


Urine Color  


 


Urine Clarity  


 


Urine pH  


 


Ur Specific Gravity  


 


Urine Protein  


 


Urine Glucose (UA)  


 


Urine Ketones  


 


Urine Blood  


 


Urine Nitrate  


 


Urine Bilirubin  


 


Urine Urobilinogen  


 


Ur Leukocyte Esterase  


 


Urine WBC (Auto)  


 


Urine RBC (Auto)  


 


Ur Squamous Epith Cells  


 


Blood Type  


 


Antibody Screen  














Assessment & Plan





- Assessment and Plan (Free Text)


Assessment: 





(1) Syncope and collapse


Assessment and Plan: 


Patient with altered mental status for 30  minutes, unlikely cardiac


ECHO: Normal EF


Carotids pending


Trop x 2 negative





(2) Multiple contusions


Assessment and Plan: 


Lidoderm patch, Tylenol and Morphine Prn





Physical and occupational therapy, patient need to [facility] after discharge. 


Status: Acute   Priority: High   





(3) Anemia


Assessment and Plan: 


Hbg is 9.2, which is well below her baseline.  Will do anemia work up, Rectic 

count, iron studies, B12, and folate, and also stool ob x3.  


Status: Acute   Priority: High   





(4) UTI (urinary tract infection)


Assessment and Plan: 


UA showed +3 LE as wells aso RBCs and WBCs, started Cipro 200mg IV q12h due to 

renal insufficiency and Amoxicillin allergy.  


Follow up blood and urine culture. 


Status: Acute   Priority: High   





(5) HTN (hypertension)


Assessment and Plan: 


Norvasc 5mg Lopressor 50mg bid


Status: Chronic   Priority: Medium   





(6) Hyperlipidemia


Assessment and Plan: 


Statin, follow up lipid panel


also continue Lovaza


Status: Chronic   Priority: Medium   





(7) History of kidney transplant


Assessment and Plan: 


Patient is on several medication such as cylcosporine 75mg and 50mg HS, 

Cellcept 500mg bid, prednisone 5mg, and also Bactrim DS once a day for 

prophylaxis. 


Consulted patient's nephrologist Dr. Dozier


Status: Chronic   





(8) Prophylactic measure


Assessment and Plan: 


Will hold VTE due to anemia and recent head trauma , and also old SCD due to 

lower extremity injury


Pepcid 20mg daily. 


Status: Acute   Priority: Medium

## 2017-10-06 NOTE — CP.PCM.PN
Subjective





- Date & Time of Evaluation


Date of Evaluation: 10/06/17


Time of Evaluation: 07:10





- Subjective


Subjective: 





PGY-1 progress note for Dr. Perez





Patient seen and examined at bedside. Patient states that she feels a bit 

better than yesterday. Patient complains of chest wall tenderness that 

exacerbates when she breathes. This causes her to avoid deep inspiration, and 

as a result, the patient feels a bit short of breath. Patient denies fever, 

chills, abdominal pain, dysuria.





Objective





- Vital Signs/Intake and Output


Vital Signs (last 24 hours): 


 











Temp Pulse Resp BP Pulse Ox


 


 98.4 F   58 L  20   130/68   98 


 


 10/05/17 23:02  10/05/17 23:02  10/05/17 23:02  10/05/17 23:02  10/05/17 23:02








Intake and Output: 


 











 10/06/17 10/06/17





 06:59 18:59


 


Intake Total 220 


 


Balance 220 














- Medications


Medications: 


 Current Medications





Acetaminophen (Tylenol 325mg Tab)  650 mg PO Q6 PRN


   PRN Reason: Pain, moderate (4-7)


Amlodipine Besylate (Norvasc)  5 mg PO DAILY [facility]


Cyclosporine (Cyclosporine)  50 mg PO HS [facility]


   Last Admin: 10/05/17 21:33 Dose:  50 mg


Cyclosporine (Cyclosporine)  75 mg PO DAILY [facility]


   Last Admin: 10/05/17 17:03 Dose:  Not Given


Famotidine (Pepcid)  20 mg PO DAILY [facility]


   Last Admin: 10/05/17 17:49 Dose:  20 mg


Ciprofloxacin (Cipro 200mg/100ml D5w)  100 mls @ 67 mls/hr IVPB Q12H [facility]


   Last Admin: 10/06/17 02:51 Dose:  67 mls/hr


Lidocaine (Lidoderm)  1 ea TD DAILY [facility]


Metoprolol Succinate (Toprol Xl)  50 mg PO BID [facility]


   Last Admin: 10/05/17 17:46 Dose:  50 mg


Morphine Sulfate (Morphine)  1 mg IVP Q4H PRN


   PRN Reason: Pain, severe (8-10)


   Last Admin: 10/06/17 03:20 Dose:  1 mg


Mycophenolate Mofetil (Cellcept)  500 mg PO BID [facility]


   Last Admin: 10/05/17 17:45 Dose:  500 mg


Omega-3-Acid Ethyl Esters (Lovaza)  1 gm PO BID [facility]


   Last Admin: 10/05/17 17:46 Dose:  1 gm


Prednisone (Prednisone Tab)  5 mg PO DAILY [facility]


   Last Admin: 10/05/17 17:02 Dose:  Not Given


Rosuvastatin Calcium (Crestor)  20 mg PO HS [facility]


   Last Admin: 10/05/17 21:32 Dose:  Not Given


Trimethoprim/Sulfamethoxazole (Bactrim Ss Tab)  1 tab PO DAILY [facility]


   Last Admin: 10/05/17 17:06 Dose:  Not Given











- Labs


Labs: 


 





 10/05/17 07:59 





 10/05/17 07:59 





 











PT  11.9 SECONDS (9.7-12.2)   10/05/17  07:59    


 


INR  1.1   10/05/17  07:59    


 


APTT  23 SECONDS (21-34)   10/05/17  07:59    














- Constitutional


Appears: No Acute Distress





- Head Exam


Head Exam: NORMOCEPHALIC.  absent: ATRAUMATIC (bruising on the right side of 

the face with right periorbital swelling)





- Eye Exam


Eye Exam: EOMI, PERRL





- ENT Exam


ENT Exam: Mucous Membranes Moist





- Respiratory Exam


Respiratory Exam: Chest Wall Tenderness (s/p multiple falls), Clear to 

Ausculation Bilateral.  absent: Rales, Rhonchi, Wheezes





- Cardiovascular Exam


Cardiovascular Exam: REGULAR RHYTHM, +S1, +S2





- GI/Abdominal Exam


GI & Abdominal Exam: Soft, Normal Bowel Sounds.  absent: Tenderness





- Extremities Exam


Extremities Exam: Pedal Edema (trace edema bilaterally), Tenderness (right 

upper and lower extremity tenderness)





- Neurological Exam


Neurological Exam: Alert, Awake, Oriented x3


Additional comments: 





Patient is weak on the right side, limited by pain.





- Psychiatric Exam


Psychiatric exam: Normal Affect, Normal Mood





- Skin


Skin Exam: Dry, Warm





Assessment and Plan





- Assessment and Plan (Free Text)


Plan: 





Syncope and collapse


EKG showed sinus arrhythmia--premature atrial contractions


monitor on tele


Cardiology consulted, Dr. Humphreys. Help appreciated


F/u Carotid doppler


F/u echo


F/u orthostatic vitals


PT/OT ordered


Fall precautions


CPK elevated 586


Dr. Bojorquez orthopedic consult for right shoulder pain s/p fall. Help 

appreciated.





Multiple contusions


Lidoderm patch


Tylenol prn


Morphine Prn








Anemia


On admission was 9.6 which was below patient's baseline


Iron studies demonstrate low transferrin








UTI (urinary tract infection)


UA showed +3 LE as wells aso RBCs and WBCs


started Cipro 200mg IV q12h due to renal insufficiency and Amoxicillin allergy.

  


F/u blood cultures


F/u urine cultures   








HTN (hypertension)


Norvasc 5mg 


Lopressor 50mg bid


  





Hyperlipidemia


Home statin Lipitor switched to formulary on hand. Crestor 20 mg HS


lipid panel demonstrates borderline elevated Triglycerides


continue home Lovaza


  





History of kidney transplant


Restarted home meds:


* cylcosporine 75mg PO daily


* cyclosporine 50 mg PO HS


* Cellcept 500 mg PO BID


* Prednisone 5 mg PO daily


* Bactrim DS once a day for prophylaxis. 


Consulted patient's nephrologist Dr. Crawford








Prophylactic measure


VTE held due to anemia and recent head trauma 


SCD held due to lower extremity injury


Pepcid 20mg daily. 





Case DW Dr. Chris Beattyed PGY-1

## 2017-10-06 NOTE — CP.PCM.CON
History of Present Illness





- History of Present Illness


History of Present Illness: 








HPI:  Patient is a 71 year old female with a history of HTN, hyperlipidemia, 

Kidney transplant is here because she was found on the floor by her daughter 

whom she lives.  Patient's daughter says the patient was unconscious and was 

unable to arouse patient for about 30 units.  Patient does not remember why she 

was on the floor not does she remember falling.  She says she was been having 

right sided chest pain, shoulder pain, and right lower extremity pain and 

weakness since falling 3 days ago.  She has extensive bruising on the right 

side of her face and legs.  She denies any changes in vision, hearing, feeling 

lightheaded, nausea, vomiting, diarrhea, difficulty breathing, wheezing, 

palpitations, anxiety, depression, numbness tingling, or memory loss.   She was 

seen in the ED about 3 days ago for a fall, they did x:rays of knee, shoulder, 

head and cervical spine CT which were negative. 


Symcope occurred post pain med use 





PMH:  see above


PSH:  left kidney transplant, 2 lumbar laminectomy 


PFH:  Denies family history of heart disease, Cancer, DM, or HTN


SH:  Lives with daughter, ambulates without assistant, denies smoking, etoh use

, or drug use





Other PMH:


 KIDNEY TRANPLANT 


CKD 3


HTN


DDD SPINE- SEVERE


DYSLIPIDEMIA





PSH:


KIDNEY TRANSPALNT


LAMINECTOMY X 2


SPINAL IMPLANT


CATARACTS


AV GRAFT AND REMOVAL








Review of Systems





- Constitutional


Constitutional: Daytime Sleepiness, Weakness





- EENT


Eyes: Blurred Vision


Ears: Decreased Hearing


Nose/Mouth/Throat: absent: As Per HPI, Epistaxis, Nasal Congestion, Nasal 

Discharge, Nasal Obstruction, Nasal Trauma, Nose Pain, Post Nasal Drip, Sinus 

Pain, Sinus Pressure, Bleeding Gums, Change in Voice, Dental Pain, Dry Mouth, 

Dysphagia, Halitosis, Hoarsness, Lip Swelling, Mouth Lesions, Mouth Pain, 

Odynophagia, Sore Throat, Throat Swelling, Tongue Swelling, Facial Pain, Neck 

Pain, Neck Mass, Other





- Cardiovascular


Cardiovascular: Dyspnea on Exertion, Lightheadedness





- Respiratory


Respiratory: Dyspnea on Exertion





- Gastrointestinal


Gastrointestinal: Early Satiety





- Genitourinary


Genitourinary: Urinary Frequency





- Musculoskeletal


Musculoskeletal: Muscle Cramps, Myalgias





- Integumentary


Integumentary: Dry Skin





- Neurological


Neurological: Syncope, Weakness





Past Patient History





- Infectious Disease


Hx of Infectious Diseases: None





- Past Medical History & Family History


Past Family History: Reviewed and not pertinent





- Past Social History


Smoking Status: Never Smoked


Chewing Tobacco Use: No


Cigar Use: No


Alcohol: None


Drugs: Denies


Home Situation {Lives}: With Family





- CARDIAC


Hx Cardiac Disorders: No


Hx Hypertension: Yes





- PULMONARY


Hx Respiratory Disorders: No





- NEUROLOGICAL


Hx Neurological Disorder: No





- HEENT


Hx Cataracts: Yes





- RENAL


Hx Chronic Kidney Disease: Yes


Other/Comment: Rt. kidney transplant, Aug. 3,2003





- ENDOCRINE/METABOLIC


Hx Endocrine Disorders: No





- MUSCULOSKELETAL/RHEUMATOLOGICAL


Hx Falls: Yes (Last Monday)





- GASTROINTESTINAL


Hx Gastrointestinal Disorders: No





- PSYCHIATRIC


Hx Substance Use: No





- SURGICAL HISTORY


Hx Surgeries: Yes


Hx Kidney Transplant: Yes


Other/Comment: Rt. kidney transplant Aug. 3,2003





- ANESTHESIA


Hx Anesthesia: Yes


Hx Anesthesia Reactions: No





Meds


Allergies/Adverse Reactions: 


 Allergies











Allergy/AdvReac Type Severity Reaction Status Date / Time


 


amoxicillin AdvReac Severe  Verified 10/05/17 06:58














- Medications


Medications: 


 Current Medications





Acetaminophen (Tylenol 325mg Tab)  650 mg PO Q6 PRN


   PRN Reason: Pain, moderate (4-7)


Amlodipine Besylate (Norvasc)  [facility] PO [facility]H [facility]


Cyclosporine (Cyclosporine)  [facility] mg PO HS [facility]


   Last Admin: 10/05/17 21:33 Dose:  50 mg


Cyclosporine (Cyclosporine)  75 mg PO DAILY [facility]


   Last Admin: 10/06/17 10:13 Dose:  75 mg


Famotidine (Pepcid)  20 mg PO DAILY [facility]


   Last Admin: 10/06/17 10:11 Dose:  20 mg


Ciprofloxacin (Cipro 200mg/100ml D5w)  100 mls @ 67 mls/hr IVPB Q12H [facility]


   Last Admin: 10/06/17 13:17 Dose:  67 mls/hr


Lidocaine (Lidoderm)  1 ea TD DAILY [facility]


   Last Admin: 10/06/17 10:14 Dose:  1 ea


Metoprolol Succinate (Toprol Xl)  50 mg PO BID [facility]


   Last Admin: 10/06/17 10:10 Dose:  50 mg


Morphine Sulfate (Morphine)  1 mg IVP Q4H PRN


   PRN Reason: Pain, severe (8-10)


   Last Admin: 10/06/17 03:20 Dose:  1 mg


Mycophenolate Mofetil (Cellcept)  500 mg PO BID [facility]


   Last Admin: 10/06/17 10:12 Dose:  500 mg


Omega-3-Acid Ethyl Esters (Lovaza)  1 gm PO BID [facility]


   Last Admin: 10/06/17 10:12 Dose:  1 gm


Pneumococcal Polyvalent Vaccine (Pneumovax 23 Vaccine)  0.5 ml IM .ONCE ONE


   Stop: 10/08/17 10:01


Prednisone (Prednisone Tab)  5 mg PO DAILY [facility]


   Last Admin: 10/06/17 10:11 Dose:  5 mg


Rosuvastatin Calcium (Crestor)  20 mg PO HS [facility]


   Last Admin: 10/05/17 21:32 Dose:  Not Given


Trimethoprim/Sulfamethoxazole (Bactrim Ss Tab)  1 tab PO DAILY [facility]


   Last Admin: 10/06/17 10:12 Dose:  1 tab











Physical Exam





- Constitutional


Appears: Older Than Stated Age, Chronically Ill





- Head Exam


Head Exam: NORMOCEPHALIC





- Eye Exam


Eye Exam: EOMI, Normal appearance





- Neck Exam


Neck exam: Positive for: Normal Inspection.  Negative for: Tenderness





- Respiratory Exam


Respiratory Exam: Clear to Auscultation Bilateral, NORMAL BREATHING PATTERN





- Cardiovascular Exam


Cardiovascular Exam: REGULAR RHYTHM, +S1, Systolic Murmur





- GI/Abdominal Exam


GI & Abdominal Exam: Soft.  absent: Tenderness





- Extremities Exam


Extremities exam: Positive for: normal inspection.  Negative for: tenderness





- Psychiatric Exam


Psychiatric exam: Flat Affect





- Skin


Skin Exam: Dry, Warm





Results





- Vital Signs


Recent Vital Signs: 


 Last Vital Signs











Temp  98 F   10/06/17 11:30


 


Pulse  69   10/06/17 12:30


 


Resp  20   10/06/17 11:30


 


BP  139/74   10/06/17 11:30


 


Pulse Ox  96   10/06/17 11:30














- Labs


Result Diagrams: 


 10/06/17 07:20





 10/06/17 07:20


Labs: 


 Laboratory Results - last 24 hr











  10/05/17 10/05/17 10/05/17





  16:58 16:58 16:58


 


WBC   


 


RBC   


 


Hgb   


 


Hct   


 


MCV   


 


MCH   


 


MCHC   


 


RDW   


 


Plt Count   


 


MPV   


 


Neut % (Auto)   


 


Lymph % (Auto)   


 


Mono % (Auto)   


 


Eos % (Auto)   


 


Baso % (Auto)   


 


Neut #   


 


Lymph #   


 


Mono #   


 


Eos #   


 


Baso #   


 


Retic Count   


 


Sodium   


 


Potassium   


 


Chloride   


 


Carbon Dioxide   


 


Anion Gap   


 


BUN   


 


Creatinine   


 


Est GFR ( Amer)   


 


Est GFR (Non-Af Amer)   


 


POC Glucose (mg/dL)   


 


Random Glucose   


 


Calcium   


 


Iron    13 L


 


TIBC    236 L


 


% Saturation    5 L


 


Transferrin   


 


Ferritin   


 


Total Bilirubin   


 


AST   


 


ALT   


 


Alkaline Phosphatase   


 


Total Creatine Kinase   586 H 


 


Troponin I  0.0650  


 


Total Protein   


 


Albumin   


 


Globulin   


 


Albumin/Globulin Ratio   


 


Triglycerides   


 


Cholesterol   


 


LDL Cholesterol Direct   


 


HDL Cholesterol   


 


Vitamin B12   


 


Folate   


 


Free T4   


 


TSH 3rd Generation   














  10/06/17 10/06/17 10/06/17





  00:18 06:28 07:20


 


WBC    6.7


 


RBC    3.36 L


 


Hgb    9.2 L


 


Hct    27.6 L


 


MCV    82.1


 


MCH    27.3


 


MCHC    33.3


 


RDW    14.5


 


Plt Count    138


 


MPV    7.8


 


Neut % (Auto)    68.1


 


Lymph % (Auto)    19.5 L


 


Mono % (Auto)    11.8 H


 


Eos % (Auto)    0.4


 


Baso % (Auto)    0.2


 


Neut #    4.6


 


Lymph #    1.3


 


Mono #    0.8


 


Eos #    0.0


 


Baso #    0.0


 


Retic Count    1.8 H


 


Sodium   


 


Potassium   


 


Chloride   


 


Carbon Dioxide   


 


Anion Gap   


 


BUN   


 


Creatinine   


 


Est GFR ( Amer)   


 


Est GFR (Non-Af Amer)   


 


POC Glucose (mg/dL)   88 


 


Random Glucose   


 


Calcium   


 


Iron   


 


TIBC   


 


% Saturation   


 


Transferrin   


 


Ferritin   


 


Total Bilirubin   


 


AST   


 


ALT   


 


Alkaline Phosphatase   


 


Total Creatine Kinase   


 


Troponin I  0.0500  


 


Total Protein   


 


Albumin   


 


Globulin   


 


Albumin/Globulin Ratio   


 


Triglycerides   


 


Cholesterol   


 


LDL Cholesterol Direct   


 


HDL Cholesterol   


 


Vitamin B12   


 


Folate   


 


Free T4   


 


TSH 3rd Generation   














  10/06/17 10/06/17





  07:20 07:20


 


WBC  


 


RBC  


 


Hgb  


 


Hct  


 


MCV  


 


MCH  


 


MCHC  


 


RDW  


 


Plt Count  


 


MPV  


 


Neut % (Auto)  


 


Lymph % (Auto)  


 


Mono % (Auto)  


 


Eos % (Auto)  


 


Baso % (Auto)  


 


Neut #  


 


Lymph #  


 


Mono #  


 


Eos #  


 


Baso #  


 


Retic Count  


 


Sodium  135 


 


Potassium  3.9 


 


Chloride  104 


 


Carbon Dioxide  22 


 


Anion Gap  14 


 


BUN  33 H 


 


Creatinine  1.4 H 


 


Est GFR ( Amer)  45 


 


Est GFR (Non-Af Amer)  37 


 


POC Glucose (mg/dL)  


 


Random Glucose  83 


 


Calcium  8.6 


 


Iron  


 


TIBC  


 


% Saturation  


 


Transferrin   158.31 L


 


Ferritin  197.0 


 


Total Bilirubin  0.6 


 


AST  38 H 


 


ALT  33 


 


Alkaline Phosphatase  42 


 


Total Creatine Kinase  


 


Troponin I  


 


Total Protein  5.9 L 


 


Albumin  3.1 L 


 


Globulin  2.8 


 


Albumin/Globulin Ratio  1.1 


 


Triglycerides  159 H 


 


Cholesterol  119 


 


LDL Cholesterol Direct  39 


 


HDL Cholesterol  52 


 


Vitamin B12  338 


 


Folate  > 20.0 


 


Free T4   1.21


 


TSH 3rd Generation  0.71 














Assessment & Plan


(1) Syncope and collapse


Status: Acute   Priority: High   





(2) HTN (hypertension)


Status: Chronic   Priority: Medium   





(3) History of kidney transplant


Status: Chronic   





(4) Hyperlipidemia


Status: Chronic   Priority: Medium   





- Assessment and Plan (Free Text)


Plan: 





Continue immunosupprerssants


Check CSA level


Avoid excess pain meds


syncope workup

## 2017-10-07 NOTE — RAD
PROCEDURE:  Right Knee Radiographs.



HISTORY:

s/p fall



COMPARISON:

None.



FINDINGS:



BONES:

No acute fractures. 



JOINTS:

Severe medial compartment narrowing. 



JOINT EFFUSION:

Small suprapatellar joint effusion. 



OTHER FINDINGS:

Diffuse soft tissue swelling.



IMPRESSION:

Soft tissue swelling without acute articular or osseous abnormality.

## 2017-10-07 NOTE — CP.PCM.PN
Subjective





- Date & Time of Evaluation


Date of Evaluation: 10/07/17


Time of Evaluation: 10:00





- Subjective


Subjective: 





pain in wrist


no acute events


for belgica placement


no chest pain


no sob


no urinary abnormalities


no nausea


no vomiting


mild depression


no edema


no rash


no headache





Objective





- Vital Signs/Intake and Output


Vital Signs (last 24 hours): 


 











Temp Pulse Resp BP Pulse Ox


 


 98.9 F   71   20   143/71   99 


 


 10/07/17 07:10  10/07/17 08:10  10/07/17 07:10  10/07/17 07:10  10/07/17 07:10








Intake and Output: 


 











 10/07/17 10/07/17





 06:59 18:59


 


Intake Total 520 


 


Balance 520 














- Medications


Medications: 


 Current Medications





Acetaminophen (Tylenol 325mg Tab)  650 mg PO Q6 PRN


   PRN Reason: Pain, moderate (4-7)


Amlodipine Besylate (Norvasc)  5 mg PO Q24H [facility]


   Last Admin: 10/06/17 18:35 Dose:  5 mg


Cyclosporine (Cyclosporine)  50 mg PO HS [facility]


   Last Admin: 10/06/17 22:07 Dose:  50 mg


Cyclosporine (Cyclosporine)  75 mg PO DAILY [facility]


   Last Admin: 10/07/17 09:55 Dose:  75 mg


Docusate Sodium (Colace)  100 mg PO BID [facility]


   Last Admin: 10/07/17 09:54 Dose:  100 mg


Famotidine (Pepcid)  20 mg PO DAILY [facility]


   Last Admin: 10/07/17 09:55 Dose:  20 mg


Guaifenesin (Robitussin)  100 mg PO Q4H PRN


   PRN Reason: Cough


Ciprofloxacin (Cipro 200mg/100ml D5w)  100 mls @ 67 mls/hr IVPB Q12H [facility]


   Last Admin: 10/07/17 01:08 Dose:  67 mls/hr


Lidocaine (Lidoderm)  1 ea TD DAILY [facility]


   Last Admin: 10/07/17 09:54 Dose:  1 ea


Metoprolol Succinate (Toprol Xl)  50 mg PO BID [facility]


   Last Admin: 10/07/17 09:54 Dose:  50 mg


Morphine Sulfate (Morphine)  1 mg IVP Q4H PRN


   PRN Reason: Pain, severe (8-10)


   Last Admin: 10/07/17 12:01 Dose:  1 mg


Mycophenolate Mofetil (Cellcept)  500 mg PO BID [facility]


   Last Admin: 10/07/17 09:56 Dose:  500 mg


Omega-3-Acid Ethyl Esters (Lovaza)  1 gm PO BID [facility]


   Last Admin: 10/07/17 09:56 Dose:  1 gm


Pneumococcal Polyvalent Vaccine (Pneumovax 23 Vaccine)  0.5 ml IM .ONCE ONE


   Stop: 10/08/17 10:01


Prednisone (Prednisone Tab)  5 mg PO DAILY [facility]


   Last Admin: 10/07/17 09:55 Dose:  5 mg


Rosuvastatin Calcium (Crestor)  20 mg PO HS [facility]


   Last Admin: 10/06/17 21:28 Dose:  Not Given


Trimethoprim/Sulfamethoxazole (Bactrim Ss Tab)  1 tab PO DAILY [facility]


   Last Admin: 10/07/17 09:56 Dose:  1 tab











- Labs


Labs: 


 





 10/07/17 07:18 





 10/07/17 07:18 





 











PT  11.9 SECONDS (9.7-12.2)   10/05/17  07:59    


 


INR  1.1   10/05/17  07:59    


 


APTT  23 SECONDS (21-34)   10/05/17  07:59    














- Constitutional


Appears: Non-toxic





- Head Exam


Additional comments: 





ecchymoses on right face





- Eye Exam


Eye Exam: EOMI





- ENT Exam


ENT Exam: Mucous Membranes Moist





- Neck Exam


Neck Exam: Full ROM.  absent: Lymphadenopathy





- Respiratory Exam


Respiratory Exam: Clear to Ausculation Bilateral.  absent: Accessory Muscle Use





- Cardiovascular Exam


Cardiovascular Exam: REGULAR RHYTHM





- GI/Abdominal Exam


GI & Abdominal Exam: Soft, Normal Bowel Sounds


Additional comments: 





allograft nontender





- Neurological Exam


Neurological Exam: Alert, Oriented x3





Assessment and Plan





- Assessment and Plan (Free Text)


Assessment: 





renal transplant


htn


ckd 3


fall/ecchymotic


no fractures








for rehab


complete cardiac w/u of syncope

## 2017-10-07 NOTE — CP.PCM.PN
Subjective





- Date & Time of Evaluation


Date of Evaluation: 10/06/17


Time of Evaluation: 19:50





- Subjective


Subjective: 





Patient seen and evaluated


No new events noted





Review of Systems





- Review of Systems


All systems: reviewed and no additional remarkable complaints except





- Constitutional


Constitutional: absent: Chills, Fever





- EENT


Eyes: absent: Change in Vision


Ears: absent: Dizziness





- Cardiovascular


Cardiovascular: Chest Pain, Syncope.  absent: Dyspnea, Palpitations, Radiating 

Pain





- Respiratory


Respiratory: absent: Cough, Dyspnea, Wheezing





- Gastrointestinal


Gastrointestinal: absent: Abdominal Pain, Constipation, Diarrhea, Nausea, 

Vomiting





- Genitourinary


Genitourinary: absent: Dysuria





- Musculoskeletal


Musculoskeletal: Limited Range of Motion, Muscle Weakness, Myalgias.  absent: 

Back Pain, Joint Swelling, Numbness, Tingling





- Neurological


Neurological: Syncope, Weakness.  absent: Convulsions, Dizziness, Numbness, 

Frequent Falls, Headaches, Memory Loss, Paresthesias, Sensory Deficit





- Psychiatric


Psychiatric: absent: Anxiety





- Endocrine


Endocrine: absent: Fatigue, Palpitations








Physical Exam





- Constitutional


Appears: Non-toxic, No Acute Distress





- Eye Exam


Eye Exam: Normal appearance, PERRL.  absent: Nystagmus, Scleral icterus


Pupil Exam: NORMAL ACCOMODATION





- ENT Exam


ENT Exam: Mucous Membranes Moist, Normal Exam, Normal External Ear Exam, Normal 

Oropharynx, TM's Normal Bilaterally





- Neck Exam


Neck exam: Positive for: Normal Inspection





- Respiratory Exam


Respiratory Exam: Clear to Auscultation Bilateral.  absent: Rales, Rhonchi, 

Wheezes





- Cardiovascular Exam


Cardiovascular Exam: REGULAR RHYTHM, RRR, +S1, +S2.  absent: Gallop, Rubs





- GI/Abdominal Exam


GI & Abdominal Exam: Normal Bowel Sounds, Soft.  absent: Guarding, Rebound, 

Tenderness





- Extremities Exam


Extremities exam: Positive for: pedal edema, tenderness.  Negative for: normal 

inspection





- Back Exam


Back exam: absent: CVA tenderness (L), CVA tenderness (R)





- Neurological Exam


Neurological exam: Alert, Oriented x3


Additional comments: 





weaker on the right side both upper and lower extremity





- Psychiatric Exam


Psychiatric exam: Normal Affect, Normal Mood





- Skin


Skin Exam: Pallor


Additional comments: 





Bruising on the left and face








Objective





- Vital Signs/Intake and Output


Vital Signs (last 24 hours): 


 











Temp Pulse Resp BP Pulse Ox


 


 98.7 F   72   20   134/69   100 


 


 10/07/17 15:00  10/07/17 15:00  10/07/17 15:00  10/07/17 15:00  10/07/17 15:00











- Medications


Medications: 


 Current Medications





Acetaminophen (Tylenol 325mg Tab)  650 mg PO Q6 PRN


   PRN Reason: Pain, moderate (4-7)


Amlodipine Besylate (Norvasc)  5 mg PO Q24H [facility]


   Last Admin: 10/07/17 17:21 Dose:  5 mg


Cyclosporine (Cyclosporine)  50 mg PO HS [facility]


   Last Admin: 10/06/17 22:07 Dose:  50 mg


Cyclosporine (Cyclosporine)  75 mg PO DAILY [facility]


   Last Admin: 10/07/17 09:55 Dose:  75 mg


Docusate Sodium (Colace)  100 mg PO BID [facility]


   Last Admin: 10/07/17 17:16 Dose:  100 mg


Famotidine (Pepcid)  20 mg PO DAILY [facility]


   Last Admin: 10/07/17 09:55 Dose:  20 mg


Guaifenesin (Robitussin)  100 mg PO Q4H PRN


   PRN Reason: Cough


Ciprofloxacin (Cipro 200mg/100ml D5w)  100 mls @ 67 mls/hr IVPB Q12H [facility]


   Last Admin: 10/07/17 15:51 Dose:  67 mls/hr


Lidocaine (Lidoderm)  1 ea TD DAILY [facility]


   Last Admin: 10/07/17 09:54 Dose:  1 ea


Metoprolol Succinate (Toprol Xl)  50 mg PO BID [facility]


   Last Admin: 10/07/17 17:21 Dose:  50 mg


Morphine Sulfate (Morphine)  1 mg IVP Q4H PRN


   PRN Reason: Pain, severe (8-10)


   Last Admin: 10/07/17 12:01 Dose:  1 mg


Mycophenolate Mofetil (Cellcept)  500 mg PO BID [facility]


   Last Admin: 10/07/17 17:16 Dose:  500 mg


Omega-3-Acid Ethyl Esters (Lovaza)  1 gm PO BID [facility]


   Last Admin: 10/07/17 17:16 Dose:  1 gm


Pneumococcal Polyvalent Vaccine (Pneumovax 23 Vaccine)  0.5 ml IM .ONCE ONE


   Stop: 10/08/17 10:01


Prednisone (Prednisone Tab)  5 mg PO DAILY [facility]


   Last Admin: 10/07/17 09:55 Dose:  5 mg


Rosuvastatin Calcium (Crestor)  20 mg PO HS [facility]


   Last Admin: 10/06/17 21:28 Dose:  Not Given


Trimethoprim/Sulfamethoxazole (Bactrim Ss Tab)  1 tab PO DAILY [facility]


   Last Admin: 10/07/17 09:56 Dose:  1 tab











- Labs


Labs: 


 





 10/07/17 07:18 





 10/07/17 07:18 





 











PT  11.9 SECONDS (9.7-12.2)   10/05/17  07:59    


 


INR  1.1   10/05/17  07:59    


 


APTT  23 SECONDS (21-34)   10/05/17  07:59    














Assessment and Plan





- Assessment and Plan (Free Text)


Assessment: 





(1) Syncope and collapse


Assessment and Plan: 


Patient with altered mental status for 30  minutes, unlikely cardiac


ECHO: Normal EF


Carotids normal


Trop x 3 negative





Tilt table test and Stress test on Monday


Patient will likely need neuro evaluation





(2) Multiple contusions


Assessment and Plan: 


Lidoderm patch, Tylenol and Morphine Prn





Physical and occupational therapy, patient need to [facility]RI[facility] after discharge. 


Status: Acute   Priority: High   





(3) Anemia


Assessment and Plan: 


Hbg is 9.2, which is well below her baseline.  Will do anemia work up, Rectic 

count, iron studies, B12, and folate, and also stool ob x3.  


Status: Acute   Priority: High   





(4) UTI (urinary tract infection)


Assessment and Plan: 


UA showed +3 LE as wells aso RBCs and WBCs, started Cipro 200mg IV q12h due to 

renal insufficiency and Amoxicillin allergy.  


Follow up blood and urine culture. 


Status: Acute   Priority: High   





(5) HTN (hypertension)


Assessment and Plan: 


Norvasc 5mg Lopressor 50mg bid


Status: Chronic   Priority: Medium   





(6) Hyperlipidemia


Assessment and Plan: 


Statin, follow up lipid panel


also continue Lovaza


Status: Chronic   Priority: Medium   





(7) History of kidney transplant


Assessment and Plan: 


Patient is on several medication such as cylcosporine 75mg and 50mg HS, 

Cellcept 500mg bid, prednisone 5mg, and also Bactrim DS once a day for 

prophylaxis. 


Consulted patient's nephrologist Dr. Dozier


Status: Chronic   





(8) Prophylactic measure


Assessment and Plan: 


Will hold VTE due to anemia and recent head trauma , and also old SCD due to 

lower extremity injury


Pepcid 20mg daily. 


Status: Acute   Priority: Medium

## 2017-10-07 NOTE — CON
INPATIENT CONSULTATION



DATE:  10/07/2017



REASON FOR CONSULTATION:  Status post fall with right shoulder and right

leg pain.



HISTORY OF PRESENT ILLNESS:  This is a 71-year-old female who according to

the daughter had fell on this past Monday and subsequently was found on the

floor of her apartment on Wednesday.  She was admitted to the hospital 2

days ago for possible syncope, UTI as well as anemia.  Today, the patient

complains of right shoulder and right knee pain.



PHYSICAL EXAMINATION:

GENERAL:  On clinical examination, she is awake, alert and oriented x3

EXTREMITIES:  Evaluation of the right shoulder shows she has ecchymosis

about the right arm.  There is no gross deformity or crepitus appreciated. 

Negative Sulcus sign is appreciated.  She is tolerating passive forward

flexion of approximately 40 degrees with pain.  The patient is unable to

actively move the right shoulder.  She is nontender over humeral shaft. 

She is nontender about the elbow and is tolerating some range of motion of

the elbow without any pain.  She is actively flexing and extending her

wrists without any pain and is able to make a full fist.  Grossly, she is

neurovascularly intact.  Evaluation of the right lower extremity shows that

she is tolerating some passive internal and external rotation of the right

hip without any pain.  She is able to tolerate some passive flexion of the

hip without any pain.  She does have significant ecchymosis along the

anterior aspect of her knee and has tenderness to palpation over her knee. 

Her thigh and calf are otherwise soft and nontender.  No tibial crepitus is

appreciated and no gross deformity is appreciated.  She is able to actively

dorsiflex and plantar flex her ankles and wiggle her toes without any pain.

Again, she is grossly neurovascularly intact.



X-rays of the right shoulder showed no obvious fractures or dislocations. 

X-rays of her pelvis showed no obvious fractures or dislocations.  No

x-rays of the knee were done.



IMPRESSION:  Status post fall with right shoulder and right knee pain.



PLAN:  We are going to order x-rays of her right knee.  For now, we will

keep her nonweightbearing.  We will follow up once the x-rays are complete.





__________________________________________

Garrison Bojorquez MD





DD:  10/07/2017 16:25:35

DT:  10/07/2017 16:56:09

[facility] # [id number]

## 2017-10-07 NOTE — CP.PCM.PN
Subjective





- Date & Time of Evaluation


Date of Evaluation: 10/07/17


Time of Evaluation: 10:00





- Subjective


Subjective: 





Medicine Progress Note


Patient seen and examined.  Patient is complaining of generalized body aches 

due to her fall.  She states that she has a dry cough and that it is causing 

worsening of her pain. She complains that she has not had a bowel movement in 4 

days.  Patient is eating well with good appetite.  She denies fever, shortness 

of breath, dizziness, abdominal pain, palpitations, and chest pain.  





Objective





- Vital Signs/Intake and Output


Vital Signs (last 24 hours): 


 











Temp Pulse Resp BP Pulse Ox


 


 98.9 F   72   20   143/71   99 


 


 10/07/17 07:10  10/07/17 07:10  10/07/17 07:10  10/07/17 07:10  10/07/17 07:10








Intake and Output: 


 











 10/07/17 10/07/17





 06:59 18:59


 


Intake Total 520 


 


Balance 520 














- Medications


Medications: 


 Current Medications





Acetaminophen (Tylenol 325mg Tab)  650 mg PO Q6 PRN


   PRN Reason: Pain, moderate (4-7)


Amlodipine Besylate (Norvasc)  5 mg PO Q24H [facility]


   Last Admin: 10/06/17 18:35 Dose:  5 mg


Cyclosporine (Cyclosporine)  50 mg PO HS [facility]


   Last Admin: 10/06/17 22:07 Dose:  50 mg


Cyclosporine (Cyclosporine)  75 mg PO DAILY [facility]


   Last Admin: 10/07/17 09:55 Dose:  75 mg


Docusate Sodium (Colace)  100 mg PO BID [facility]


   Last Admin: 10/07/17 09:54 Dose:  100 mg


Famotidine (Pepcid)  20 mg PO DAILY [facility]


   Last Admin: 10/07/17 09:55 Dose:  20 mg


Guaifenesin (Robitussin)  100 mg PO Q4H PRN


   PRN Reason: Cough


Ciprofloxacin (Cipro 200mg/100ml D5w)  100 mls @ 67 mls/hr IVPB Q12H [facility]


   Last Admin: 10/07/17 01:08 Dose:  67 mls/hr


Lidocaine (Lidoderm)  1 ea TD DAILY [facility]


   Last Admin: 10/07/17 09:54 Dose:  1 ea


Metoprolol Succinate (Toprol Xl)  50 mg PO BID [facility]


   Last Admin: 10/07/17 09:54 Dose:  50 mg


Morphine Sulfate (Morphine)  1 mg IVP Q4H PRN


   PRN Reason: Pain, severe (8-10)


   Last Admin: 10/06/17 03:20 Dose:  1 mg


Mycophenolate Mofetil (Cellcept)  500 mg PO BID [facility]


   Last Admin: 10/07/17 09:56 Dose:  500 mg


Omega-3-Acid Ethyl Esters (Lovaza)  1 gm PO BID [facility]


   Last Admin: 10/07/17 09:56 Dose:  1 gm


Pneumococcal Polyvalent Vaccine (Pneumovax 23 Vaccine)  0.5 ml IM .ONCE ONE


   Stop: 10/08/17 10:01


Prednisone (Prednisone Tab)  5 mg PO DAILY [facility]


   Last Admin: 10/07/17 09:55 Dose:  5 mg


Rosuvastatin Calcium (Crestor)  20 mg PO HS [facility]


   Last Admin: 10/06/17 21:28 Dose:  Not Given


Trimethoprim/Sulfamethoxazole (Bactrim Ss Tab)  1 tab PO DAILY [facility]


   Last Admin: 10/07/17 09:56 Dose:  1 tab











- Labs


Labs: 


 





 10/07/17 07:18 





 10/07/17 07:18 





 











PT  11.9 SECONDS (9.7-12.2)   10/05/17  07:59    


 


INR  1.1   10/05/17  07:59    


 


APTT  23 SECONDS (21-34)   10/05/17  07:59    














- Constitutional


Appears: Non-toxic, No Acute Distress





- Head Exam


Additional comments: 





ecchymosis on right face, soft tissue swelling present 





- Eye Exam


Eye Exam: EOMI, Normal appearance, PERRL





- ENT Exam


ENT Exam: Mucous Membranes Moist





- Respiratory Exam


Respiratory Exam: Clear to Ausculation Bilateral, NORMAL BREATHING PATTERN.  

absent: Prolonged Expiratory Phase, Rales, Rhonchi, Wheezes, Respiratory 

Distress, Stridor





- Cardiovascular Exam


Cardiovascular Exam: REGULAR RHYTHM, +S1, +S2.  absent: Tachycardia





- GI/Abdominal Exam


GI & Abdominal Exam: Soft, Normal Bowel Sounds.  absent: Tenderness





- Extremities Exam


Extremities Exam: Joint Swelling (right knee swelling and tenderness), Pedal 

Edema (1+ pitting bilateral )


Additional comments: 





ecchymosis and soft tissue swelling of right lower extremity, skin warm to 

touch 





- Neurological Exam


Neurological Exam: Alert, Awake, Oriented x3





- Psychiatric Exam


Psychiatric exam: Normal Affect, Normal Mood





- Skin


Skin Exam: Dry, Intact, Warm





Assessment and Plan





- Assessment and Plan (Free Text)


Assessment: 





Fall 


Consider cardiac vs neurological vs mechanical cause


EKG showed sinus arrhythmia--premature atrial contractions


ROMIs negative x3 


Cardiology consulted, Dr. Humphreys. Help appreciated


Telemetry monitor showing NSR 


Carotid doppler- normal findings 


ECHO- LV EF normal, mild TR, mild AS, mild MS


F/u orthostatic vitals


Fall precautions


CPK elevated 586


f/u vitamin D level


PT/OT ordered- recommended [facility] on discharge, plan to dc to [facility] once 

patient stable 





Multiple contusions


Lidoderm patch TD daily


Tylenol 350mg PO q6h prn mild pain 


Morphine 1mg IV q4h prn moderate pain 


Dr. Bojorquez orthopedic consult for right shoulder pain s/p fall. Help 

appreciated.


CT head negative for acute bleed


CT chest negative for fracture 





Anemia


On admission was 9.6 which was below patient's baseline


Iron studies demonstrate low transferrin





UTI (urinary tract infection)


UA showed +3 LE as wells aso RBCs and WBCs


started Cipro 200mg IV q12h due to renal insufficiency and Amoxicillin allergy.

  


Afebrile, no leukocytosis 


blood cultures negative x24 hours


F/u urine cultures   





Constipation


Colace 100mg PO BID


Given Dulcolax PO x1


Monitor for bowel movement 





HTN (hypertension)


Well controlled 


Norvasc 5mg PO daily 


Toprol XL 50mg bid


  


Hyperlipidemia


Home statin Lipitor switched to formulary on hand. Crestor 20 mg HS


lipid panel demonstrates borderline elevated Triglycerides


continue home Lovaza


  


Liver hyperdensity 


CT Chest- incidental finding of left lobe liver hyperdensity 3.6cm


Will f/u with CT abdomen/pelvis or MRI without contrast once patient stable to 

assess etiology 





History of kidney transplant


Restarted home meds:


* cylcosporine 75mg PO daily


* cyclosporine 50 mg PO HS


* Cellcept 500 mg PO BID


* Prednisone 5 mg PO daily


* Bactrim DS once a day for prophylaxis. 


Consulted patient's nephrologist Dr. Crawford- help appreciated





Prophylactic measure


VTE held due to anemia and recent trauma 


SCD held due to lower extremity injury


Pepcid 20mg daily.

## 2017-10-08 NOTE — CP.PCM.PN
Subjective





- Date & Time of Evaluation


Date of Evaluation: 10/08/17


Time of Evaluation: 09:07





- Subjective


Subjective: 





PGY1 Medicine Note for Dr. Perez





Patient seen and examined.  Patient is stating that her generalized body aches 

are improving.  She still has a dry cough and that it is causing worsening of 

her pain. Patient is eating well.  She denies fever, shortness of breath, 

dizziness, abdominal pain, palpitations, and chest pain.  





Objective





- Vital Signs/Intake and Output


Vital Signs (last 24 hours): 


 











Temp Pulse Resp BP Pulse Ox


 


 97.6 F   92 H  20   137/66   95 


 


 10/08/17 07:05  10/08/17 07:05  10/08/17 07:05  10/08/17 07:05  10/08/17 07:05








Intake and Output: 


 











 10/08/17 10/08/17





 06:59 18:59


 


Intake Total 460 


 


Output Total 3 


 


Balance 457 














- Medications


Medications: 


 Current Medications





Acetaminophen (Tylenol 325mg Tab)  650 mg PO Q6 PRN


   PRN Reason: Pain, moderate (4-7)


Amlodipine Besylate (Norvasc)  5 mg PO Q24H [facility]


   Last Admin: 10/07/17 17:21 Dose:  5 mg


Cyclosporine (Cyclosporine)  50 mg PO HS [facility]


   Last Admin: 10/07/17 21:22 Dose:  50 mg


Cyclosporine (Cyclosporine)  75 mg PO DAILY [facility]


   Last Admin: 10/07/17 09:55 Dose:  75 mg


Docusate Sodium (Colace)  100 mg PO BID [facility]


   Last Admin: 10/07/17 17:16 Dose:  100 mg


Famotidine (Pepcid)  20 mg PO DAILY [facility]


   Last Admin: 10/07/17 09:55 Dose:  20 mg


Guaifenesin (Robitussin)  100 mg PO Q4H PRN


   PRN Reason: Cough


Ciprofloxacin (Cipro 200mg/100ml D5w)  100 mls @ 67 mls/hr IVPB Q12H [facility]


   Last Admin: 10/08/17 01:59 Dose:  67 mls/hr


Lidocaine (Lidoderm)  1 ea TD DAILY [facility]


   Last Admin: 10/07/17 09:54 Dose:  1 ea


Metoprolol Succinate (Toprol Xl)  50 mg PO BID [facility]


   Last Admin: 10/07/17 17:21 Dose:  50 mg


Morphine Sulfate (Morphine)  1 mg IVP Q4H PRN


   PRN Reason: Pain, severe (8-10)


   Last Admin: 10/07/17 21:22 Dose:  1 mg


Mycophenolate Mofetil (Cellcept)  500 mg PO BID [facility]


   Last Admin: 10/07/17 17:16 Dose:  500 mg


Omega-3-Acid Ethyl Esters (Lovaza)  1 gm PO BID [facility]


   Last Admin: 10/07/17 17:16 Dose:  1 gm


Pneumococcal Polyvalent Vaccine (Pneumovax 23 Vaccine)  0.5 ml IM .ONCE ONE


   Stop: 10/08/17 10:01


Prednisone (Prednisone Tab)  5 mg PO DAILY [facility]


   Last Admin: 10/07/17 09:55 Dose:  5 mg


Rosuvastatin Calcium (Crestor)  20 mg PO HS [facility]


   Last Admin: 10/07/17 21:22 Dose:  20 mg


Trimethoprim/Sulfamethoxazole (Bactrim Ss Tab)  1 tab PO DAILY [facility]


   Last Admin: 10/07/17 09:56 Dose:  1 tab











- Labs


Labs: 


 





 10/08/17 08:07 





 10/08/17 08:07 





 











PT  11.9 SECONDS (9.7-12.2)   10/05/17  07:59    


 


INR  1.1   10/05/17  07:59    


 


APTT  23 SECONDS (21-34)   10/05/17  07:59    














- Constitutional


Appears: Non-toxic, No Acute Distress





- Head Exam


Additional comments: 





ecchymosis on right face, soft tissue swelling present 





- Eye Exam


Eye Exam: EOMI, Normal appearance, PERRL





- ENT Exam


ENT Exam: Mucous Membranes Moist





- Respiratory Exam


Respiratory Exam: Clear to Ausculation Bilateral, NORMAL BREATHING PATTERN





- Cardiovascular Exam


Cardiovascular Exam: REGULAR RHYTHM, +S1, +S2





- GI/Abdominal Exam


GI & Abdominal Exam: Soft, Normal Bowel Sounds.  absent: Distended, Firm, 

Guarding, Rigid, Tenderness





- Extremities Exam


Extremities Exam: Joint Swelling (right knee swelling and tenderness), Pedal 

Edema (1+ pitting edema)


Additional comments: 





ecchymosis and soft tissue swelling of right lower extremity, skin warm to 

touch 





- Neurological Exam


Neurological Exam: Alert, Awake, Oriented x3





- Psychiatric Exam


Psychiatric exam: Normal Affect, Normal Mood





- Skin


Skin Exam: Dry, Warm





Assessment and Plan





- Assessment and Plan (Free Text)


Plan: 





Fall 


Consider cardiac vs neurological vs mechanical cause


EKG showed sinus arrhythmia--premature atrial contractions


ROMIs negative x3 


Cardiology consulted, Dr. Humphreys. Help appreciated - Patient is to have Tilt 

table test and Stress test on Monday 10/9


Telemetry monitor showing NSR 


Carotid doppler- normal findings 


ECHO- LV EF normal, mild TR, mild AS, mild MS


F/u orthostatic vitals


Fall precautions


CPK elevated 586


f/u vitamin D level


PT/OT ordered- recommended [facility] on discharge, plan to dc to [facility] once 

patient stable 





Multiple contusions


Lidoderm patch TD daily


Tylenol 350mg PO q6h prn mild pain 


Morphine 1mg IV q4h prn moderate pain 


Dr. Bojorquez orthopedic consult for right shoulder pain s/p fall. Help 

appreciated.


CT head negative for acute bleed


CT chest negative for fracture 





Anemia


On admission was 9.6 which was below patient's baseline


Iron studies demonstrate low transferrin





UTI (urinary tract infection)


UA showed +3 LE as wells aso RBCs and WBCs


started Cipro 200mg IV q12h due to renal insufficiency and Amoxicillin allergy.

  


Afebrile, no leukocytosis 


blood cultures negative x48 hours


F/u urine cultures   





Constipation


Colace 100mg PO BID


Given Dulcolax PO x1


Monitor for bowel movement 





HTN (hypertension)


Well controlled 


Norvasc 5mg PO daily 


Toprol XL 50mg bid


  


Hyperlipidemia


Home statin Lipitor switched to formulary on hand. Crestor 20 mg HS


lipid panel demonstrates borderline elevated Triglycerides


continue home Lovaza


  


Liver hyperdensity 


CT Chest- incidental finding of left lobe liver hyperdensity 3.6cm


Will f/u with CT abdomen/pelvis or MRI without contrast once patient stable to 

assess etiology 





History of kidney transplant


Restarted home meds:


* cylcosporine 75mg PO daily


* cyclosporine 50 mg PO HS


* Cellcept 500 mg PO BID


* Prednisone 5 mg PO daily


* Bactrim DS once a day for prophylaxis. 


Consulted patient's nephrologist Dr. Crawford- help appreciated





Prophylactic measure


VTE held due to anemia and recent trauma 


SCD held due to lower extremity injury


Pepcid 20mg daily. 





Will discuss case with Dr. Chris Yoon Fabiola PGY1

## 2017-10-08 NOTE — CP.PCM.PN
Subjective





- Date & Time of Evaluation


Date of Evaluation: 10/07/17


Time of Evaluation: 20:20





- Subjective


Subjective: 





Patient seen and evaluated


Denies chest pain, dizziness or dyspnea





Review of Systems





- Review of Systems


All systems: reviewed and no additional remarkable complaints except





- Constitutional


Constitutional: absent: Chills, Fever





- EENT


Eyes: absent: Change in Vision


Ears: absent: Dizziness





- Cardiovascular


Cardiovascular: Chest Pain, Syncope.  absent: Dyspnea, Palpitations, Radiating 

Pain





- Respiratory


Respiratory: absent: Cough, Dyspnea, Wheezing





- Gastrointestinal


Gastrointestinal: absent: Abdominal Pain, Constipation, Diarrhea, Nausea, 

Vomiting





- Genitourinary


Genitourinary: absent: Dysuria





- Musculoskeletal


Musculoskeletal: Limited Range of Motion, Muscle Weakness, Myalgias.  absent: 

Back Pain, Joint Swelling, Numbness, Tingling





- Neurological


Neurological: Syncope, Weakness.  absent: Convulsions, Dizziness, Numbness, 

Frequent Falls, Headaches, Memory Loss, Paresthesias, Sensory Deficit





- Psychiatric


Psychiatric: absent: Anxiety





- Endocrine


Endocrine: absent: Fatigue, Palpitations








Physical Exam





- Constitutional


Appears: Non-toxic, No Acute Distress





- Eye Exam


Eye Exam: Normal appearance, PERRL.  absent: Nystagmus, Scleral icterus


Pupil Exam: NORMAL ACCOMODATION





- ENT Exam


ENT Exam: Mucous Membranes Moist, Normal Exam, Normal External Ear Exam, Normal 

Oropharynx, TM's Normal Bilaterally





- Neck Exam


Neck exam: Positive for: Normal Inspection





- Respiratory Exam


Respiratory Exam: Clear to Auscultation Bilateral.  absent: Rales, Rhonchi, 

Wheezes





- Cardiovascular Exam


Cardiovascular Exam: REGULAR RHYTHM, RRR, +S1, +S2.  absent: Gallop, Rubs





- GI/Abdominal Exam


GI & Abdominal Exam: Normal Bowel Sounds, Soft.  absent: Guarding, Rebound, 

Tenderness





- Extremities Exam


Extremities exam: Positive for: pedal edema, tenderness.  Negative for: normal 

inspection





- Back Exam


Back exam: absent: CVA tenderness (L), CVA tenderness (R)





- Neurological Exam


Neurological exam: Alert, Oriented x3


Additional comments: 





weaker on the right side both upper and lower extremity





- Psychiatric Exam


Psychiatric exam: Normal Affect, Normal Mood





- Skin


Skin Exam: Pallor


Additional comments: 





Bruising on the left and face











Objective





- Vital Signs/Intake and Output


Vital Signs (last 24 hours): 


 











Temp Pulse Resp BP Pulse Ox


 


 98.6 F   89   20   123/64   99 


 


 10/08/17 16:28  10/08/17 16:28  10/08/17 16:28  10/08/17 16:28  10/08/17 16:28











- Medications


Medications: 


 Current Medications





Acetaminophen (Tylenol 325mg Tab)  650 mg PO Q6 PRN


   PRN Reason: Pain, moderate (4-7)


Amlodipine Besylate (Norvasc)  5 mg PO Q24H [facility]


   Last Admin: 10/08/17 19:00 Dose:  5 mg


Cyclosporine (Cyclosporine)  50 mg PO HS [facility]


   Last Admin: 10/07/17 21:22 Dose:  50 mg


Cyclosporine (Cyclosporine)  75 mg PO DAILY [facility]


   Last Admin: 10/08/17 10:54 Dose:  75 mg


Docusate Sodium (Colace)  100 mg PO BID [facility]


   Last Admin: 10/08/17 19:00 Dose:  100 mg


Famotidine (Pepcid)  20 mg PO DAILY [facility]


   Last Admin: 10/08/17 10:55 Dose:  20 mg


Guaifenesin (Robitussin)  100 mg PO Q4H PRN


   PRN Reason: Cough


   Last Admin: 10/08/17 19:56 Dose:  100 mg


Ciprofloxacin (Cipro 200mg/100ml D5w)  100 mls @ 67 mls/hr IVPB Q12H [facility]


   Last Admin: 10/08/17 13:53 Dose:  67 mls/hr


Lidocaine (Lidoderm)  1 ea TD DAILY [facility]


   Last Admin: 10/08/17 10:55 Dose:  1 ea


Metoprolol Succinate (Toprol Xl)  50 mg PO BID [facility]


   Last Admin: 10/08/17 19:00 Dose:  50 mg


Morphine Sulfate (Morphine)  1 mg IVP Q4H PRN


   PRN Reason: Pain, severe (8-10)


   Last Admin: 10/08/17 19:56 Dose:  1 mg


Mycophenolate Mofetil (Cellcept)  500 mg PO BID [facility]


   Last Admin: 10/08/17 19:00 Dose:  500 mg


Omega-3-Acid Ethyl Esters (Lovaza)  1 gm PO BID [facility]


   Last Admin: 10/08/17 19:00 Dose:  1 gm


Prednisone (Prednisone Tab)  5 mg PO DAILY [facility]


   Last Admin: 10/08/17 10:57 Dose:  5 mg


Rosuvastatin Calcium (Crestor)  20 mg PO HS [facility]


   Last Admin: 10/07/17 21:22 Dose:  20 mg


Trimethoprim/Sulfamethoxazole (Bactrim Ss Tab)  1 tab PO DAILY [facility]


   Last Admin: 10/08/17 10:54 Dose:  1 tab











- Labs


Labs: 


 





 10/08/17 08:07 





 10/08/17 08:07 





 











PT  11.9 SECONDS (9.7-12.2)   10/05/17  07:59    


 


INR  1.1   10/05/17  07:59    


 


APTT  23 SECONDS (21-34)   10/05/17  07:59    














Assessment and Plan





- Assessment and Plan (Free Text)


Assessment: 





(1) Syncope and collapse


Assessment and Plan: 


Patient with altered mental status for 30  minutes, unlikely cardiac


ECHO: Normal EF


Carotids normal


Trop x 3 negative





Tilt table test on Monday


Patient will likely need neuro evaluation





(2) Multiple contusions


Assessment and Plan: 


Lidoderm patch, Tylenol and Morphine Prn





Physical and occupational therapy, patient need to [facility] after discharge. 


Status: Acute   Priority: High   





(3) Anemia


Assessment and Plan: 


Hbg is 9.2, which is well below her baseline.  Will do anemia work up, Rectic 

count, iron studies, B12, and folate, and also stool ob x3.  


Status: Acute   Priority: High   





(4) UTI (urinary tract infection)


Assessment and Plan: 


UA showed +3 LE as wells aso RBCs and WBCs, started Cipro 200mg IV q12h due to 

renal insufficiency and Amoxicillin allergy.  


Follow up blood and urine culture. 


Status: Acute   Priority: High   





(5) HTN (hypertension)


Assessment and Plan: 


Norvasc 5mg Lopressor 50mg bid


Status: Chronic   Priority: Medium   





(6) Hyperlipidemia


Assessment and Plan: 


Statin, follow up lipid panel


also continue Lovaza


Status: Chronic   Priority: Medium   





(7) History of kidney transplant


Assessment and Plan: 


Patient is on several medication such as cylcosporine 75mg and 50mg HS, 

Cellcept 500mg bid, prednisone 5mg, and also Bactrim DS once a day for 

prophylaxis. 


Consulted patient's nephrologist Dr. Dozier


Status: Chronic   





(8) Prophylactic measure


Assessment and Plan: 


Will hold VTE due to anemia and recent head trauma , and also old SCD due to 

lower extremity injury


Pepcid 20mg daily. 


Status: Acute   Priority: Medium

## 2017-10-08 NOTE — CP.PCM.PN
Subjective





- Date & Time of Evaluation


Date of Evaluation: 10/08/17


Time of Evaluation: 18:50





- Subjective


Subjective: 





Patient seen and evaluated


No new events noted


Daughter at bedisde


Discussed about Tilt Table test in am





Review of Systems





- Review of Systems


All systems: reviewed and no additional remarkable complaints except





- Constitutional


Constitutional: absent: Chills, Fever





- EENT


Eyes: absent: Change in Vision


Ears: absent: Dizziness





- Cardiovascular


Cardiovascular: Chest Pain, Syncope.  absent: Dyspnea, Palpitations, Radiating 

Pain





- Respiratory


Respiratory: absent: Cough, Dyspnea, Wheezing





- Gastrointestinal


Gastrointestinal: absent: Abdominal Pain, Constipation, Diarrhea, Nausea, 

Vomiting





- Genitourinary


Genitourinary: absent: Dysuria





- Musculoskeletal


Musculoskeletal: Limited Range of Motion, Muscle Weakness, Myalgias.  absent: 

Back Pain, Joint Swelling, Numbness, Tingling





- Neurological


Neurological: Syncope, Weakness.  absent: Convulsions, Dizziness, Numbness, 

Frequent Falls, Headaches, Memory Loss, Paresthesias, Sensory Deficit





- Psychiatric


Psychiatric: absent: Anxiety





- Endocrine


Endocrine: absent: Fatigue, Palpitations








Physical Exam





- Constitutional


Appears: Non-toxic, No Acute Distress





- Eye Exam


Eye Exam: Normal appearance, PERRL.  absent: Nystagmus, Scleral icterus


Pupil Exam: NORMAL ACCOMODATION





- ENT Exam


ENT Exam: Mucous Membranes Moist, Normal Exam, Normal External Ear Exam, Normal 

Oropharynx, TM's Normal Bilaterally





- Neck Exam


Neck exam: Positive for: Normal Inspection





- Respiratory Exam


Respiratory Exam: Clear to Auscultation Bilateral.  absent: Rales, Rhonchi, 

Wheezes





- Cardiovascular Exam


Cardiovascular Exam: REGULAR RHYTHM, RRR, +S1, +S2.  absent: Gallop, Rubs





- GI/Abdominal Exam


GI & Abdominal Exam: Normal Bowel Sounds, Soft.  absent: Guarding, Rebound, 

Tenderness





- Extremities Exam


Extremities exam: Positive for: pedal edema, tenderness.  Negative for: normal 

inspection





- Back Exam


Back exam: absent: CVA tenderness (L), CVA tenderness (R)





- Neurological Exam


Neurological exam: Alert, Oriented x3


Additional comments: 





weaker on the right side both upper and lower extremity





- Psychiatric Exam


Psychiatric exam: Normal Affect, Normal Mood





- Skin


Skin Exam: Pallor


Additional comments: 





Bruising on the left and face











Objective





- Vital Signs/Intake and Output


Vital Signs (last 24 hours): 


 











Temp Pulse Resp BP Pulse Ox


 


 98.6 F   89   20   123/64   99 


 


 10/08/17 16:28  10/08/17 16:28  10/08/17 16:28  10/08/17 16:28  10/08/17 16:28











- Medications


Medications: 


 Current Medications





Acetaminophen (Tylenol 325mg Tab)  650 mg PO Q6 PRN


   PRN Reason: Pain, moderate (4-7)


Amlodipine Besylate (Norvasc)  5 mg PO Q24H [facility], [facility]


   Last Admin: 10/08/17 19:00 Dose:  5 mg


Cyclosporine (Cyclosporine)  50 mg PO HS [facility]


   Last Admin: 10/07/17 21:22 Dose:  50 mg


Cyclosporine (Cyclosporine)  75 mg PO DAILY [facility]


   Last Admin: 10/08/17 10:54 Dose:  75 mg


Docusate Sodium (Colace)  100 mg PO BID [facility]


   Last Admin: 10/08/17 19:00 Dose:  100 mg


Famotidine (Pepcid)  20 mg PO DAILY [facility]


   Last Admin: 10/08/17 10:55 Dose:  20 mg


Guaifenesin (Robitussin)  100 mg PO Q4H PRN


   PRN Reason: Cough


   Last Admin: 10/08/17 19:56 Dose:  100 mg


Ciprofloxacin (Cipro 200mg/100ml D5w)  100 mls @ 67 mls/hr IVPB Q12H [facility]


   Last Admin: 10/08/17 13:53 Dose:  67 mls/hr


Lidocaine (Lidoderm)  1 ea TD DAILY [facility]


   Last Admin: 10/08/17 10:55 Dose:  1 ea


Metoprolol Succinate (Toprol Xl)  50 mg PO BID [facility]


   Last Admin: 10/08/17 19:00 Dose:  50 mg


Morphine Sulfate (Morphine)  1 mg IVP Q4H PRN


   PRN Reason: Pain, severe (8-10)


   Last Admin: 10/08/17 19:56 Dose:  1 mg


Mycophenolate Mofetil (Cellcept)  500 mg PO BID [facility]


   Last Admin: 10/08/17 19:00 Dose:  500 mg


Omega-3-Acid Ethyl Esters (Lovaza)  1 gm PO BID [facility]


   Last Admin: 10/08/17 19:00 Dose:  1 gm


Prednisone (Prednisone Tab)  5 mg PO DAILY [facility]


   Last Admin: 10/08/17 10:57 Dose:  5 mg


Rosuvastatin Calcium (Crestor)  20 mg PO HS [facility]


   Last Admin: 10/07/17 21:22 Dose:  20 mg


Trimethoprim/Sulfamethoxazole (Bactrim Ss Tab)  1 tab PO DAILY [facility]


   Last Admin: 10/08/17 10:54 Dose:  1 tab











- Labs


Labs: 


 





 10/08/17 08:07 





 10/08/17 08:07 





 











PT  11.9 SECONDS (9.7-12.2)   10/05/17  07:59    


 


INR  1.1   10/05/17  07:59    


 


APTT  23 SECONDS (21-34)   10/05/17  07:59    














Assessment and Plan





- Assessment and Plan (Free Text)


Assessment: 





(1) Syncope and collapse


Assessment and Plan: 


Patient with altered mental status for 30  minutes, unlikely cardiac


ECHO: Normal EF


Carotids normal


Trop x 3 negative





Tilt table test tomorrow. Stress test cancelled (Patient and daughter not 

comfortable with the stress test)


Patient will likely need neuro evaluation





(2) Multiple contusions


Assessment and Plan: 


Lidoderm patch, Tylenol and Morphine Prn





Physical and occupational therapy, patient need to [facility] after discharge. 


Status: Acute   Priority: High   





(3) Anemia


Assessment and Plan: 


Hbg is 9.2, which is well below her baseline.  Will do anemia work up, Rectic 

count, iron studies, B12, and folate, and also stool ob x3.  


Status: Acute   Priority: High   





(4) UTI (urinary tract infection)


Assessment and Plan: 


UA showed +3 LE as wells aso RBCs and WBCs, started Cipro 200mg IV q12h due to 

renal insufficiency and Amoxicillin allergy.  


Follow up blood and urine culture. 


Status: Acute   Priority: High   





(5) HTN (hypertension)


Assessment and Plan: 


Norvasc 5mg Lopressor 50mg bid


Status: Chronic   Priority: Medium   





(6) Hyperlipidemia


Assessment and Plan: 


Statin, follow up lipid panel


also continue Lovaza


Status: Chronic   Priority: Medium   





(7) History of kidney transplant


Assessment and Plan: 


Patient is on several medication such as cylcosporine 75mg and 50mg HS, 

Cellcept 500mg bid, prednisone 5mg, and also Bactrim DS once a day for 

prophylaxis. 


Consulted patient's nephrologist Dr. Dozier


Status: Chronic   





(8) Prophylactic measure


Assessment and Plan: 


Will hold VTE due to anemia and recent head trauma , and also old SCD due to 

lower extremity injury


Pepcid 20mg daily. 


Status: Acute   Priority: Medium

## 2017-10-09 NOTE — PROCN
TILT-TABLE TEST REPORT



DATE:  10/09/2017



INDICATION:  Syncope.



:  Nico Salguero MD



The patient was put on the table and tilted at 30 degrees for 5 minutes and

60 degrees for 15 minutes.  Blood pressure and heart rate response were

monitored every minute.  The patient tolerated the procedure well.  No

symptoms were reported during the test.



IMPRESSION:  Appropriate blood pressure and heart rate response during the

tilt-table test.  Asymptomatic throughout the test.







__________________________________________

Nico Salguero MD





DD:  10/09/2017 9:58:52

DT:  10/09/2017 11:39:03

Job # [id number]

## 2017-10-09 NOTE — CP.PCM.PN
Subjective





- Date & Time of Evaluation


Date of Evaluation: 10/09/17


Time of Evaluation: 11:50





- Subjective


Subjective: 





Patient seen and evaluated


No new events noted


Comfortable








Review of Systems





- Review of Systems


All systems: reviewed and no additional remarkable complaints except





- Constitutional


Constitutional: absent: Chills, Fever





- EENT


Eyes: absent: Change in Vision


Ears: absent: Dizziness





- Cardiovascular


Cardiovascular: Chest Pain, Syncope.  absent: Dyspnea, Palpitations, Radiating 

Pain





- Respiratory


Respiratory: absent: Cough, Dyspnea, Wheezing





- Gastrointestinal


Gastrointestinal: absent: Abdominal Pain, Constipation, Diarrhea, Nausea, 

Vomiting





- Genitourinary


Genitourinary: absent: Dysuria





- Musculoskeletal


Musculoskeletal: Limited Range of Motion, Muscle Weakness, Myalgias.  absent: 

Back Pain, Joint Swelling, Numbness, Tingling





- Neurological


Neurological: Syncope, Weakness.  absent: Convulsions, Dizziness, Numbness, 

Frequent Falls, Headaches, Memory Loss, Paresthesias, Sensory Deficit





- Psychiatric


Psychiatric: absent: Anxiety





- Endocrine


Endocrine: absent: Fatigue, Palpitations








Physical Exam





- Constitutional


Appears: Non-toxic, No Acute Distress





- Eye Exam


Eye Exam: Normal appearance, PERRL.  absent: Nystagmus, Scleral icterus


Pupil Exam: NORMAL ACCOMODATION





- ENT Exam


ENT Exam: Mucous Membranes Moist, Normal Exam, Normal External Ear Exam, Normal 

Oropharynx, TM's Normal Bilaterally





- Neck Exam


Neck exam: Positive for: Normal Inspection





- Respiratory Exam


Respiratory Exam: Clear to Auscultation Bilateral.  absent: Rales, Rhonchi, 

Wheezes





- Cardiovascular Exam


Cardiovascular Exam: REGULAR RHYTHM, RRR, +S1, +S2.  absent: Gallop, Rubs





- GI/Abdominal Exam


GI & Abdominal Exam: Normal Bowel Sounds, Soft.  absent: Guarding, Rebound, 

Tenderness





- Extremities Exam


Extremities exam: Positive for: pedal edema, tenderness.  Negative for: normal 

inspection





- Back Exam


Back exam: absent: CVA tenderness (L), CVA tenderness (R)





- Neurological Exam


Neurological exam: Alert, Oriented x3


Additional comments: 





weaker on the right side both upper and lower extremity





- Psychiatric Exam


Psychiatric exam: Normal Affect, Normal Mood





- Skin


Skin Exam: Pallor


Additional comments: 





Bruising on the face





Objective





- Vital Signs/Intake and Output


Vital Signs (last 24 hours): 


 











Temp Pulse Resp BP Pulse Ox


 


 98.2 F   84   18   135/69   95 


 


 10/09/17 15:16  10/09/17 15:16  10/09/17 15:16  10/09/17 15:16  10/09/17 15:16








Intake and Output: 


 











 10/09/17 10/10/17





 18:59 06:59


 


Intake Total 600 


 


Balance 600 














- Medications


Medications: 


 Current Medications





Acetaminophen (Tylenol 325mg Tab)  650 mg PO Q6 PRN


   PRN Reason: Pain, moderate (4-7)


   Last Admin: 10/09/17 20:18 Dose:  650 mg


Amlodipine Besylate (Norvasc)  5 mg PO Q24H [facility]


   Last Admin: 10/09/17 17:55 Dose:  5 mg


Ciprofloxacin (Cipro)  250 mg PO BID [facility]


   Stop: 10/16/17 18:01


   Last Admin: 10/09/17 17:55 Dose:  250 mg


Cyclosporine (Cyclosporine)  50 mg PO HS [facility]


   Last Admin: 10/08/17 22:15 Dose:  50 mg


Cyclosporine (Cyclosporine)  75 mg PO DAILY [facility]


   Last Admin: 10/09/17 11:20 Dose:  75 mg


Docusate Sodium (Colace)  100 mg PO BID [facility]


   Last Admin: 10/09/17 17:55 Dose:  100 mg


Famotidine (Pepcid)  20 mg PO DAILY [facility]


   Last Admin: 10/09/17 11:16 Dose:  20 mg


Guaifenesin (Robitussin)  100 mg PO Q4H PRN


   PRN Reason: Cough


   Last Admin: 10/09/17 05:27 Dose:  100 mg


Lidocaine (Lidoderm)  1 ea TD DAILY [facility]


   Last Admin: 10/09/17 11:14 Dose:  1 ea


Metoprolol Succinate (Toprol Xl)  50 mg PO BID [facility]


   Last Admin: 10/09/17 17:56 Dose:  50 mg


Mycophenolate Mofetil (Cellcept)  500 mg PO BID [facility]


   Last Admin: 10/09/17 17:55 Dose:  500 mg


Omega-3-Acid Ethyl Esters (Lovaza)  1 gm PO BID [facility]


   Last Admin: 10/09/17 11:16 Dose:  1 gm


Prednisone (Prednisone Tab)  5 mg PO DAILY [facility]


   Last Admin: 10/09/17 11:15 Dose:  5 mg


Rosuvastatin Calcium (Crestor)  20 mg PO HS [facility]


   Last Admin: 10/08/17 22:15 Dose:  20 mg


Trimethoprim/Sulfamethoxazole (Bactrim Ss Tab)  1 tab PO DAILY [facility]


   Last Admin: 10/09/17 11:18 Dose:  1 tab











- Labs


Labs: 


 





 10/09/17 07:14 





 10/09/17 07:14 





 











PT  11.9 SECONDS (9.7-12.2)   10/05/17  07:59    


 


INR  1.1   10/05/17  07:59    


 


APTT  23 SECONDS (21-34)   10/05/17  07:59    














Assessment and Plan





- Assessment and Plan (Free Text)


Assessment: 





Fall unlikely cardiac 


Consider cardiac vs neurological vs mechanical cause


EKG showed sinus arrhythmia--premature atrial contractions


ROMIs negative x3 


Cardiology consulted, Dr. Humphreys. Help appreciated


Telemetry monitor showing NSR 


Carotid doppler- normal findings 


ECHO- LV EF normal, mild TR, mild AS, mild MS


F/u orthostatic vitals


Fall precautions


CPK elevated 586


Vitamin D level WNL


PT/OT ordered- recommended [facility] on discharge, plan to dc to [facility] once patient 

stable


Tilt table test unremarkable


Carotid doppler preliminary report--normal finding 





Multiple contusions


Lidoderm patch TD daily


Tylenol 350mg PO q6h prn mild pain 


Morphine 1mg IV q4h prn moderate pain 


Dr. Bojorquez orthopedic consult for right shoulder pain s/p fall. Help 

appreciated.


CT head negative for acute bleed


CT chest negative for fracture 


CT RLE negative for acute fracture


Per orthopedic recommendation, immobilizer brace for RLE placed. They 

recommended just rehab for the RUE. No surgical intervention at this time.





Anemia


On admission was 9.6 which was below patient's baseline


Iron studies demonstrate low transferrin





UTI (urinary tract infection)


UA showed +3 LE as wells aso RBCs and WBCs


started Cipro 200mg IV q12h due to renal insufficiency and Amoxicillin allergy.

  


Afebrile, no leukocytosis 


blood cultures negative x48 hours


F/u urine cultures   





Constipation


Colace 100mg PO BID


Given Dulcolax PO x1


Monitor for bowel movement 





HTN (hypertension)


Well controlled 


Norvasc 5mg PO daily 


Toprol XL 50mg bid


  


Hyperlipidemia


Home statin Lipitor switched to formulary on hand. Crestor 20 mg HS


lipid panel demonstrates borderline elevated Triglycerides


continue home Lovaza


  


Liver hyperdensity 


CT Chest- incidental finding of left lobe liver hyperdensity 3.6cm


Will f/u with CT abdomen/pelvis or MRI without contrast once patient stable to 

assess etiology 





History of kidney transplant


Restarted home meds:


* cylcosporine 75mg PO daily


* cyclosporine 50 mg PO HS


* Cellcept 500 mg PO BID


* Prednisone 5 mg PO daily


* Bactrim DS once a day for prophylaxis. 


Consulted patient's nephrologist Dr. Crawford- help appreciated





Prophylactic measure


VTE held due to anemia and recent trauma 


SCD held due to lower extremity injury


Pepcid 20mg daily.

## 2017-10-09 NOTE — CP.PCM.PN
Subjective





- Date & Time of Evaluation


Date of Evaluation: 10/09/17


Time of Evaluation: 12:56





- Subjective


Subjective: 





Alert; NAD


c/o diffuse pains from fall


No fxs seen on XRs


Creat stable at 1.4- likely baseline


No f, c, n, v, HAs, SOB, CPs





Objective





- Vital Signs/Intake and Output


Vital Signs (last 24 hours): 


 











Temp Pulse Resp BP Pulse Ox


 


 99.5 F   80   20   125/66   95 


 


 10/09/17 07:00  10/09/17 07:00  10/09/17 07:00  10/09/17 07:00  10/09/17 07:00








Intake and Output: 


 











 10/09/17 10/09/17





 06:59 18:59


 


Intake Total 320 


 


Output Total 150 


 


Balance 170 














- Medications


Medications: 


 Current Medications





Acetaminophen (Tylenol 325mg Tab)  650 mg PO Q6 PRN


   PRN Reason: Pain, moderate (4-7)


Amlodipine Besylate (Norvasc)  5 mg PO Q24H [facility]


   Last Admin: 10/08/17 19:00 Dose:  5 mg


Cyclosporine (Cyclosporine)  50 mg PO HS [facility]


   Last Admin: 10/08/17 22:15 Dose:  50 mg


Cyclosporine (Cyclosporine)  75 mg PO DAILY [facility]


   Last Admin: 10/09/17 11:20 Dose:  75 mg


Docusate Sodium (Colace)  100 mg PO BID [facility]


   Last Admin: 10/09/17 11:15 Dose:  100 mg


Famotidine (Pepcid)  20 mg PO DAILY [facility]


   Last Admin: 10/09/17 11:16 Dose:  20 mg


Guaifenesin (Robitussin)  100 mg PO Q4H PRN


   PRN Reason: Cough


   Last Admin: 10/09/17 05:27 Dose:  100 mg


Ciprofloxacin (Cipro 200mg/100ml D5w)  100 mls @ 67 mls/hr IVPB Q12H [facility]


   Last Admin: 10/09/17 02:30 Dose:  67 mls/hr


Lidocaine (Lidoderm)  1 ea TD DAILY [facility]


   Last Admin: 10/09/17 11:14 Dose:  1 ea


Metoprolol Succinate (Toprol Xl)  50 mg PO BID [facility]


   Last Admin: 10/09/17 11:15 Dose:  50 mg


Morphine Sulfate (Morphine)  1 mg IVP Q4H PRN


   PRN Reason: Pain, severe (8-10)


   Last Admin: 10/09/17 05:29 Dose:  1 mg


Mycophenolate Mofetil (Cellcept)  500 mg PO BID [facility]


   Last Admin: 10/09/17 11:17 Dose:  500 mg


Omega-3-Acid Ethyl Esters (Lovaza)  1 gm PO BID [facility]


   Last Admin: 10/09/17 11:16 Dose:  1 gm


Prednisone (Prednisone Tab)  5 mg PO DAILY [facility]


   Last Admin: 10/09/17 11:15 Dose:  5 mg


Rosuvastatin Calcium (Crestor)  20 mg PO HS [facility]


   Last Admin: 10/08/17 22:15 Dose:  20 mg


Trimethoprim/Sulfamethoxazole (Bactrim Ss Tab)  1 tab PO DAILY [facility]


   Last Admin: 10/09/17 11:18 Dose:  1 tab











- Labs


Labs: 


 





 10/09/17 07:14 





 10/09/17 07:14 





 











PT  11.9 SECONDS (9.7-12.2)   10/05/17  07:59    


 


INR  1.1   10/05/17  07:59    


 


APTT  23 SECONDS (21-34)   10/05/17  07:59    














- Constitutional


Appears: No Acute Distress, Chronically Ill





- Head Exam


Head Exam: ATRAUMATIC, NORMAL INSPECTION





- Eye Exam


Eye Exam: EOMI, Normal appearance





- Neck Exam


Neck Exam: Normal Inspection.  absent: Tenderness





- Respiratory Exam


Respiratory Exam: Clear to Ausculation Bilateral, NORMAL BREATHING PATTERN





- Cardiovascular Exam


Cardiovascular Exam: REGULAR RHYTHM, +S1





- GI/Abdominal Exam


GI & Abdominal Exam: Soft.  absent: Tenderness





- Extremities Exam


Extremities Exam: Normal Inspection.  absent: Tenderness





- Neurological Exam


Neurological Exam: Awake, CN II-XII Intact





- Skin


Skin Exam: Dry, Rash, Warm





Assessment and Plan


(1) Syncope and collapse


Status: Acute   





(2) HTN (hypertension)


Status: Chronic   





(3) History of kidney transplant


Status: Chronic   





(4) Hyperlipidemia


Status: Chronic   





- Assessment and Plan (Free Text)


Plan: 





Continue same immunosuppressants


Monitor renal function


might need rehab?


check CSA level

## 2017-10-09 NOTE — CP.PCM.DIS
Provider





- Provider


Date of Admission: 


10/07/17 10:29





Attending physician: 


James Perez Jr, MD








Hospital Course





- Lab Results


Lab Results: 


 Micro Results





10/05/17 17:00   Blood-Venous   Blood Culture - Preliminary


                            NO GROWTH AFTER 3 DAYS


10/05/17 16:30   Blood-Venous   Blood Culture - Preliminary


                            NO GROWTH AFTER 3 DAYS





 Most Recent Lab Values











WBC  6.3 K/uL (4.8-10.8)   10/09/17  07:14    


 


RBC  3.11 Mil/uL (3.80-5.20)  L  10/09/17  07:14    


 


Hgb  8.5 g/dL (11.0-16.0)  L  10/09/17  07:14    


 


Hct  25.3 % (34.0-47.0)  L  10/09/17  07:14    


 


MCV  81.2 fL (81.0-99.0)   10/09/17  07:14    


 


MCH  27.2 pg (27.0-31.0)   10/09/17  07:14    


 


MCHC  33.5 g/dL (33.0-37.0)   10/09/17  07:14    


 


RDW  14.7 % (11.5-14.5)  H  10/09/17  07:14    


 


Plt Count  147 K/uL (130-400)   10/09/17  07:14    


 


MPV  7.6 fL (7.2-11.7)   10/09/17  07:14    


 


Neut % (Auto)  68.3 % (50.0-75.0)   10/09/17  07:14    


 


Lymph % (Auto)  19.3 % (20.0-40.0)  L  10/09/17  07:14    


 


Mono % (Auto)  11.5 % (0.0-10.0)  H  10/09/17  07:14    


 


Eos % (Auto)  0.6 % (0.0-4.0)   10/09/17  07:14    


 


Baso % (Auto)  0.3 % (0.0-2.0)   10/09/17  07:14    


 


Neut #  4.3 K/uL (1.8-7.0)   10/09/17  07:14    


 


Lymph #  1.2 K/uL (1.0-4.3)   10/09/17  07:14    


 


Mono #  0.7 K/uL (0.0-0.8)   10/09/17  07:14    


 


Eos #  0.0 K/uL (0.0-0.7)   10/09/17  07:14    


 


Baso #  0.0 K/uL (0.0-0.2)   10/09/17  07:14    


 


Retic Count  1.8 % (0.5-1.5)  H  10/06/17  07:20    


 


PT  11.9 SECONDS (9.7-12.2)   10/05/17  07:59    


 


INR  1.1   10/05/17  07:59    


 


APTT  23 SECONDS (21-34)   10/05/17  07:59    


 


Sodium  135 mmol/L (132-148)   10/09/17  07:14    


 


Potassium  4.0 mmol/L (3.6-5.2)   10/09/17  07:14    


 


Chloride  103 mmol/L ()   10/09/17  07:14    


 


Carbon Dioxide  22 mmol/L (22-30)   10/09/17  07:14    


 


Anion Gap  14  (10-20)   10/09/17  07:14    


 


BUN  22 mg/dL (7-17)  H  10/09/17  07:14    


 


Creatinine  1.4 mg/dL (0.7-1.2)  H  10/09/17  07:14    


 


Est GFR ( Amer)  45   10/09/17  07:14    


 


Est GFR (Non-Af Amer)  37   10/09/17  07:14    


 


POC Glucose (mg/dL)  156 mg/dL ()  H  10/09/17  11:47    


 


Random Glucose  93 mg/dL ()   10/09/17  07:14    


 


Calcium  8.4 mg/dl (8.6-10.4)  L  10/09/17  07:14    


 


Phosphorus  2.7 mg/dL (2.5-4.5)   10/09/17  07:14    


 


Magnesium  1.8 mg/dL (1.6-2.3)   10/09/17  07:14    


 


Iron  13 ug/dL ()  L  10/05/17  16:58    


 


TIBC  236 ug/dL (250-450)  L  10/05/17  16:58    


 


% Saturation  5  (20-55)  L  10/05/17  16:58    


 


Transferrin  158.31 mg/dL (206-381)  L  10/06/17  07:20    


 


Ferritin  197.0 ng/mL  10/06/17  07:20    


 


Total Bilirubin  0.9 mg/dL (0.2-1.3)   10/09/17  07:14    


 


AST  41 U/L (14-36)  H  10/09/17  07:14    


 


ALT  47 U/L (9-52)   10/09/17  07:14    


 


Alkaline Phosphatase  40 U/L ()   10/09/17  07:14    


 


Total Creatine Kinase  586 U/L ()  H  10/05/17  16:58    


 


Troponin I  0.0500 ng/mL (0.00-0.120)   10/06/17  00:18    


 


Total Protein  5.6 g/dL (6.3-8.3)  L  10/09/17  07:14    


 


Albumin  3.0 g/dL (3.5-5.0)  L  10/09/17  07:14    


 


Globulin  2.6 gm/dL (2.2-3.9)   10/09/17  07:14    


 


Albumin/Globulin Ratio  1.1  (1.0-2.1)   10/09/17  07:14    


 


Triglycerides  159 mg/dL (0-149)  H  10/06/17  07:20    


 


Cholesterol  119 mg/dL (0-199)   10/06/17  07:20    


 


LDL Cholesterol Direct  39 mg/dL (0-129)   10/06/17  07:20    


 


HDL Cholesterol  52 mg/dL (30-70)   10/06/17  07:20    


 


Vitamin B12  338 pg/mL (239-931)   10/06/17  07:20    


 


25-OH Vitamin D Total  34.4 NG/ML (30.0-100.0)   10/07/17  13:59    


 


Folate  > 20.0 ng/mL  10/06/17  07:20    


 


Free T4  1.21 ng/dL (0.78-2.19)   10/06/17  07:20    


 


TSH 3rd Generation  0.71 mIU/L (0.46-4.68)   10/06/17  07:20    


 


Urine Color  Yellow  (YELLOW)   10/05/17  11:22    


 


Urine Clarity  Clear  (Clear)   10/05/17  11:22    


 


Urine pH  5.0  (5.0-8.0)   10/05/17  11:22    


 


Ur Specific Gravity  1.014  (1.003-1.030)   10/05/17  11:22    


 


Urine Protein  1+ mg/dL (NEGATIVE)  H  10/05/17  11:22    


 


Urine Glucose (UA)  Normal mg/dL (Normal)   10/05/17  11:22    


 


Urine Ketones  Negative mg/dL (NEGATIVE)   10/05/17  11:22    


 


Urine Blood  1+  (NEGATIVE)  H  10/05/17  11:22    


 


Urine Nitrate  Negative  (NEGATIVE)   10/05/17  11:22    


 


Urine Bilirubin  Negative  (NEGATIVE)   10/05/17  11:22    


 


Urine Urobilinogen  Normal mg/dL (0.2-1.0)   10/05/17  11:22    


 


Ur Leukocyte Esterase  3+ [place]/uL (Negative)  H  10/05/17  11:22    


 


Urine WBC (Auto)  12 /hpf (0-5)  H  10/05/17  11:22    


 


Urine RBC (Auto)  8 /hpf (0-3)  H  10/05/17  11:22    


 


Ur Squamous Epith Cells  1 /hpf (0-5)   10/05/17  11:22    


 


Stool Occult Blood  Negative  (NEGATIVE)   10/08/17  03:03    


 


Blood Type  A POSITIVE   10/05/17  07:59    


 


Antibody Screen  Negative   10/05/17  07:59    














Discharge Exam





- Head Exam


Head Exam: ATRAUMATIC, NORMAL INSPECTION





Discharge Plan





- Discharge Medications


Prescriptions: 


Ciprofloxacin HCl [Cipro] 250 mg PO BID 7 Days #14 tablet





- Follow Up Plan


Condition: STABLE


Disposition: REHAB FACILITY/REHAB UNIT


Referrals: 


Jorden Humphreys MD [Staff Provider] - 


James Perez Jr., MD [Medical Doctor] -

## 2017-10-09 NOTE — CT
PROCEDURE:  CT right knee



HISTORY:

right knee pain, soft tissue calcification



COMPARISON:

Not available



TECHNIQUE:

2.5 mm contiguous axial sections were acquired through the right 

knee.  Sagittal and coronal images were reformatted from the axial 

scan.



FINDINGS:

There is no evidence of fracture.  There is no lytic or blastic 

osseous lesion.



There is tricompartmental osteoarthritis most prominently involving 

the medial joint compartment. There are no articular erosions.  There 

is subchondral sclerosis in the medial femoral and tibial condyles.



There is a small joint effusion/hemarthrosis.  There is a small 

popliteal cyst.  There is ecchymosis involving the skin and 

subcutaneous soft tissues over the anteromedial aspect of the left 

knee. 



IMPRESSION:

No acute fracture. Osteoarthritis. Minimal effusion and small 

popliteal cyst.

## 2017-10-09 NOTE — CP.PCM.PN
Subjective





- Date & Time of Evaluation


Date of Evaluation: 10/09/17


Time of Evaluation: 09:00





Objective





- Vital Signs/Intake and Output


Vital Signs (last 24 hours): 


 











Temp Pulse Resp BP Pulse Ox


 


 99.5 F   80   20   125/66   95 


 


 10/09/17 07:00  10/09/17 07:00  10/09/17 07:00  10/09/17 07:00  10/09/17 07:00








Intake and Output: 


 











 10/09/17 10/09/17





 06:59 18:59


 


Intake Total 320 


 


Output Total 150 


 


Balance 170 














- Medications


Medications: 


 Current Medications





Acetaminophen (Tylenol 325mg Tab)  650 mg PO Q6 PRN


   PRN Reason: Pain, moderate (4-7)


Amlodipine Besylate (Norvasc)  5 mg PO Q24H [facility]


   Last Admin: 10/08/17 19:00 Dose:  5 mg


Cyclosporine (Cyclosporine)  50 mg PO HS [facility]


   Last Admin: 10/08/17 22:15 Dose:  50 mg


Cyclosporine (Cyclosporine)  75 mg PO DAILY [facility]


   Last Admin: 10/08/17 10:54 Dose:  75 mg


Docusate Sodium (Colace)  100 mg PO BID [facility]


   Last Admin: 10/08/17 19:00 Dose:  100 mg


Famotidine (Pepcid)  20 mg PO DAILY [facility]


   Last Admin: 10/08/17 10:55 Dose:  20 mg


Guaifenesin (Robitussin)  100 mg PO Q4H PRN


   PRN Reason: Cough


   Last Admin: 10/09/17 05:27 Dose:  100 mg


Ciprofloxacin (Cipro 200mg/100ml D5w)  100 mls @ 67 mls/hr IVPB Q12H [facility]


   Last Admin: 10/09/17 02:30 Dose:  67 mls/hr


Lidocaine (Lidoderm)  1 ea TD DAILY [facility]


   Last Admin: 10/08/17 10:55 Dose:  1 ea


Metoprolol Succinate (Toprol Xl)  50 mg PO BID [facility]


   Last Admin: 10/08/17 19:00 Dose:  50 mg


Morphine Sulfate (Morphine)  1 mg IVP Q4H PRN


   PRN Reason: Pain, severe (8-10)


   Last Admin: 10/09/17 05:29 Dose:  1 mg


Mycophenolate Mofetil (Cellcept)  500 mg PO BID [facility]


   Last Admin: 10/08/17 19:00 Dose:  500 mg


Omega-3-Acid Ethyl Esters (Lovaza)  1 gm PO BID [facility]


   Last Admin: 10/08/17 19:00 Dose:  1 gm


Prednisone (Prednisone Tab)  5 mg PO DAILY [facility]


   Last Admin: 10/08/17 10:57 Dose:  5 mg


Rosuvastatin Calcium (Crestor)  20 mg PO HS [facility]


   Last Admin: 10/08/17 22:15 Dose:  20 mg


Trimethoprim/Sulfamethoxazole (Bactrim Ss Tab)  1 tab PO DAILY [facility]


   Last Admin: 10/08/17 10:54 Dose:  1 tab











- Labs


Labs: 


 





 10/09/17 07:14 





 10/09/17 07:14 





 











PT  11.9 SECONDS (9.7-12.2)   10/05/17  07:59    


 


INR  1.1   10/05/17  07:59    


 


APTT  23 SECONDS (21-34)   10/05/17  07:59

## 2017-10-09 NOTE — CP.PCM.PN
Subjective





- Date & Time of Evaluation


Date of Evaluation: 10/09/17


Time of Evaluation: 11:00





- Subjective


Subjective: 





PGY-1 progress note for Dr. Perez





Patient seen and examined at bedside. Patient states that she feels a bit 

better than yesterday. Patient complains of continued right shoulder pain. 

Patient denies fever, chills, abdominal pain, dysuria.





Objective





- Vital Signs/Intake and Output


Vital Signs (last 24 hours): 


 











Temp Pulse Resp BP Pulse Ox


 


 98.2 F   84   18   135/69   95 


 


 10/09/17 15:16  10/09/17 15:16  10/09/17 15:16  10/09/17 15:16  10/09/17 15:16








Intake and Output: 


 











 10/09/17 10/09/17





 06:59 18:59


 


Intake Total 320 600


 


Output Total 150 


 


Balance 170 600














- Medications


Medications: 


 Current Medications





Acetaminophen (Tylenol 325mg Tab)  650 mg PO Q6 PRN


   PRN Reason: Pain, moderate (4-7)


Amlodipine Besylate (Norvasc)  5 mg PO Q24H [facility]


   Last Admin: 10/09/17 17:55 Dose:  5 mg


Ciprofloxacin (Cipro)  250 mg PO BID [facility]


   Stop: 10/16/17 18:01


   Last Admin: 10/09/17 17:55 Dose:  250 mg


Cyclosporine (Cyclosporine)  50 mg PO HS [facility]


   Last Admin: 10/08/17 22:15 Dose:  50 mg


Cyclosporine (Cyclosporine)  75 mg PO DAILY [facility]


   Last Admin: 10/09/17 11:20 Dose:  75 mg


Docusate Sodium (Colace)  100 mg PO BID [facility]


   Last Admin: 10/09/17 17:55 Dose:  100 mg


Famotidine (Pepcid)  20 mg PO DAILY [facility]


   Last Admin: 10/09/17 11:16 Dose:  20 mg


Guaifenesin (Robitussin)  100 mg PO Q4H PRN


   PRN Reason: Cough


   Last Admin: 10/09/17 05:27 Dose:  100 mg


Lidocaine (Lidoderm)  1 ea TD DAILY [facility]


   Last Admin: 10/09/17 11:14 Dose:  1 ea


Metoprolol Succinate (Toprol Xl)  50 mg PO BID [facility]


   Last Admin: 10/09/17 17:56 Dose:  50 mg


Mycophenolate Mofetil (Cellcept)  500 mg PO BID [facility]


   Last Admin: 10/09/17 17:55 Dose:  500 mg


Omega-3-Acid Ethyl Esters (Lovaza)  1 gm PO BID [facility]


   Last Admin: 10/09/17 11:16 Dose:  1 gm


Prednisone (Prednisone Tab)  5 mg PO DAILY [facility]


   Last Admin: 10/09/17 11:15 Dose:  5 mg


Rosuvastatin Calcium (Crestor)  20 mg PO HS [facility]


   Last Admin: 10/08/17 22:15 Dose:  20 mg


Trimethoprim/Sulfamethoxazole (Bactrim Ss Tab)  1 tab PO DAILY [facility]


   Last Admin: 10/09/17 11:18 Dose:  1 tab











- Labs


Labs: 


 





 10/09/17 07:14 





 10/09/17 07:14 





 











PT  11.9 SECONDS (9.7-12.2)   10/05/17  07:59    


 


INR  1.1   10/05/17  07:59    


 


APTT  23 SECONDS (21-34)   10/05/17  07:59    














- Constitutional


Appears: No Acute Distress





- Head Exam


Head Exam: NORMOCEPHALIC.  absent: ATRAUMATIC (bruising on the right side of 

the face with improving right periorbital swelling)





- Eye Exam


Eye Exam: EOMI, PERRL





- ENT Exam


ENT Exam: Mucous Membranes Moist





- Respiratory Exam


Respiratory Exam: Chest Wall Tenderness (s/p multiple falls), Clear to 

Ausculation Bilateral.  absent: Rales, Rhonchi, Wheezes





- Cardiovascular Exam


Cardiovascular Exam: REGULAR RHYTHM, +S1, +S2





- GI/Abdominal Exam


GI & Abdominal Exam: Soft, Normal Bowel Sounds.  absent: Tenderness





- Extremities Exam


Extremities Exam: Joint Swelling (right knee swelling and tenderness), Pedal 

Edema (trace edema bilaterally)


Additional comments: 





ecchymosis and soft tissue swelling of right lower extremity, skin warm to 

touch. Immobilizer brace in place





- Neurological Exam


Neurological Exam: Alert, Awake, Oriented x3





- Psychiatric Exam


Psychiatric exam: Normal Affect, Normal Mood





- Skin


Skin Exam: Dry, Intact, Warm





Assessment and Plan





- Assessment and Plan (Free Text)


Plan: 





Fall 


Consider cardiac vs neurological vs mechanical cause


EKG showed sinus arrhythmia--premature atrial contractions


ROMIs negative x3 


Cardiology consulted, Dr. Humphreys. Help appreciated


Telemetry monitor showing NSR 


Carotid doppler- normal findings 


ECHO- LV EF normal, mild TR, mild AS, mild MS


F/u orthostatic vitals


Fall precautions


CPK elevated 586


Vitamin D level WNL


PT/OT ordered- recommended [facility] on discharge, plan to dc to [facility] once patient 

stable


Tilt table test unremarkable


Carotid doppler preliminary report--normal finding 





Multiple contusions


Lidoderm patch TD daily


Tylenol 350mg PO q6h prn mild pain 


Morphine 1mg IV q4h prn moderate pain 


Dr. Bojorquez orthopedic consult for right shoulder pain s/p fall. Help 

appreciated.


CT head negative for acute bleed


CT chest negative for fracture 


CT RLE negative for acute fracture


Per orthopedic recommendation, immobilizer brace for RLE placed. They 

recommended just rehab for the RUE. No surgical intervention at this time.





Anemia


On admission was 9.6 which was below patient's baseline


Iron studies demonstrate low transferrin





UTI (urinary tract infection)


UA showed +3 LE as wells aso RBCs and WBCs


started Cipro 200mg IV q12h due to renal insufficiency and Amoxicillin allergy.

  


Afebrile, no leukocytosis 


blood cultures negative x48 hours


F/u urine cultures   





Constipation


Colace 100mg PO BID


Given Dulcolax PO x1


Monitor for bowel movement 





HTN (hypertension)


Well controlled 


Norvasc 5mg PO daily 


Toprol XL 50mg bid


  


Hyperlipidemia


Home statin Lipitor switched to formulary on hand. Crestor 20 mg HS


lipid panel demonstrates borderline elevated Triglycerides


continue home Lovaza


  


Liver hyperdensity 


CT Chest- incidental finding of left lobe liver hyperdensity 3.6cm


Will f/u with CT abdomen/pelvis or MRI without contrast once patient stable to 

assess etiology 





History of kidney transplant


Restarted home meds:


* cylcosporine 75mg PO daily


* cyclosporine 50 mg PO HS


* Cellcept 500 mg PO BID


* Prednisone 5 mg PO daily


* Bactrim DS once a day for prophylaxis. 


Consulted patient's nephrologist Dr. Crawford- help appreciated





Prophylactic measure


VTE held due to anemia and recent trauma 


SCD held due to lower extremity injury


Pepcid 20mg daily. 





Case DW Dr. Chris Casey PGY-1

## 2017-10-09 NOTE — CP.PCM.PN
Subjective





- Date & Time of Evaluation


Date of Evaluation: 10/09/17


Time of Evaluation: 11:03





- Subjective


Subjective: 





Patient still complaining of a lot of pain, especially in right shoulder and 

right knee. 





Objective





- Vital Signs/Intake and Output


Vital Signs (last 24 hours): 


 











Temp Pulse Resp BP Pulse Ox


 


 99.5 F   80   20   125/66   95 


 


 10/09/17 07:00  10/09/17 07:00  10/09/17 07:00  10/09/17 07:00  10/09/17 07:00








Intake and Output: 


 











 10/09/17 10/09/17





 06:59 18:59


 


Intake Total 320 


 


Output Total 150 


 


Balance 170 














- Medications


Medications: 


 Current Medications





Acetaminophen (Tylenol 325mg Tab)  650 mg PO Q6 PRN


   PRN Reason: Pain, moderate (4-7)


Amlodipine Besylate (Norvasc)  5 mg PO Q24H [facility]


   Last Admin: 10/08/17 19:00 Dose:  5 mg


Cyclosporine (Cyclosporine)  50 mg PO HS [facility]


   Last Admin: 10/08/17 22:15 Dose:  50 mg


Cyclosporine (Cyclosporine)  75 mg PO DAILY [facility]


   Last Admin: 10/08/17 10:54 Dose:  75 mg


Docusate Sodium (Colace)  100 mg PO BID [facility]


   Last Admin: 10/08/17 19:00 Dose:  100 mg


Famotidine (Pepcid)  20 mg PO DAILY [facility]


   Last Admin: 10/08/17 10:55 Dose:  20 mg


Guaifenesin (Robitussin)  100 mg PO Q4H PRN


   PRN Reason: Cough


   Last Admin: 10/09/17 05:27 Dose:  100 mg


Ciprofloxacin (Cipro 200mg/100ml D5w)  100 mls @ 67 mls/hr IVPB Q12H [facility]


   Last Admin: 10/09/17 02:30 Dose:  67 mls/hr


Lidocaine (Lidoderm)  1 ea TD DAILY [facility]


   Last Admin: 10/08/17 10:55 Dose:  1 ea


Metoprolol Succinate (Toprol Xl)  50 mg PO BID [facility]


   Last Admin: 10/08/17 19:00 Dose:  50 mg


Morphine Sulfate (Morphine)  1 mg IVP Q4H PRN


   PRN Reason: Pain, severe (8-10)


   Last Admin: 10/09/17 05:29 Dose:  1 mg


Mycophenolate Mofetil (Cellcept)  500 mg PO BID [facility]


   Last Admin: 10/08/17 19:00 Dose:  500 mg


Omega-3-Acid Ethyl Esters (Lovaza)  1 gm PO BID [facility]


   Last Admin: 10/08/17 19:00 Dose:  1 gm


Prednisone (Prednisone Tab)  5 mg PO DAILY [facility]


   Last Admin: 10/08/17 10:57 Dose:  5 mg


Rosuvastatin Calcium (Crestor)  20 mg PO HS [facility]


   Last Admin: 10/08/17 22:15 Dose:  20 mg


Trimethoprim/Sulfamethoxazole (Bactrim Ss Tab)  1 tab PO DAILY [facility]


   Last Admin: 10/08/17 10:54 Dose:  1 tab











- Labs


Labs: 


 





 10/09/17 07:14 





 10/09/17 07:14 





 











PT  11.9 SECONDS (9.7-12.2)   10/05/17  07:59    


 


INR  1.1   10/05/17  07:59    


 


APTT  23 SECONDS (21-34)   10/05/17  07:59    














- Extremities Exam


Additional comments: 





RUE: noted ecchymosis and discoloration, +ROM wrist/fingers, pain with attempts 

at right shoulder ROM, sensation intact + radial pulse





RLE: diffuse swelling and ecchymosis to medial knee, [id number] blood filled 

tension blister to same. no laxity to varus/valgus. Pain with attempts to flex 

knee more than 30 degrees. Skin intact, blister intact. No scars, patient 

denies prior cut to injury to knee to explain soft tissue calcifications on 

xray. Calves soft Nt neg homans





PT note appreciated





Assessment and Plan


(1) Multiple contusions


Assessment & Plan: 


right shoulder xrays neg for fx/dislocation


PT/OT encourage OOB, ROM


rehab referral





Status: Acute   





(2) Contusion of right knee


Assessment & Plan: 


xray negative for fracture, noted calcifications in anterior soft tissues


CT scan r/o occult fx


knee immobilizer to help with rest/pain/ambulation


d/w Dr. Bojorquez, agrees with above


Status: Acute   





Radiology Interpretation





- Radiology Interpretation #3


Interpretation: 





atient Name / ID : ANTONIO VILLEDA  / [id number]


Exam Date : 10/07/2017 16:47:49 ( Approved )


Study Comment : 


Sex / Age : F  / 71Y





Creator : Jimmy Alexander MD


Dictator : Jimmy Alexander MD


 : 


Approver : Jimmy Alexander MD


Approver2 : 





Report Date : 10/07/2017 17:10:54


My Comment : 


********************************************************************************

***





PROCEDURE:  Right Knee Radiographs.





HISTORY:


s/p fall





COMPARISON:


None.





FINDINGS:





BONES:


No acute fractures. 





JOINTS:


Severe medial compartment narrowing. 





JOINT EFFUSION:


Small suprapatellar joint effusion. 





OTHER FINDINGS:


Diffuse soft tissue swelling.





IMPRESSION:


Soft tissue swelling without acute articular or osseous abnormality.

## 2017-10-10 NOTE — VASCLAB
PROCEDURE:  



HISTORY:

Syncope



COMPARISON:

None available. 



TECHNIQUE:

Grayscale and duplex Doppler evaluation of the cervical carotid and 

vertebral arteries were performed. The common carotid, carotid 

bifurcations and cervical Internal Carotid Artery (ICA) and proximal 

External Carotid Artery (ECA) were evaluated.  The vertebral arteries 

were evaluated for gross patency and flow direction. 



Report prepared by  Rose Mccormick, RHEA, S



FINDINGS:



RIGHT CAROTID ARTERIES:

1. Common Carotid Artery: No significant focal plaque formation of 

the right common carotid artery. Maximum Peak Systolic velocity: 131 

cm/sec: End-diastolic velocity 15 cm/sec.



2. Carotid Bifurcation:  plaque formation. Maximum Peak Systolic 

velocity: 81 cm/sec: End-diastolic velocity 17 cm/sec.  



3. Internal Carotid Artery:    Plaque description:   



3.1. Proximal Segment: Peak systolic velocity 65 cm/sec: 

End-diastolic velocity  19 cm/sec - % stenosis  0-15%



3.2. Middle Segment:    Peak systolic velocity 73 cm/sec: 

End-diastolic velocity  16  cm/sec - % stenosis 0-15%



3.3. Distal Segment:     Peak systolic velocity 63 cm/sec: 

End-diastolic velocity  18  cm/sec - % stenosis 0-15%



4. External Carotid Artery: No significant focal plaque formation. 

Peak systolic velocity 64 cm/sec



5. ICA/CCA Ratio: 0.9



LEFT CAROTID ARTERIES:

1. Common Carotid Artery: No significant focal plaque formation of 

the left common carotid artery. Maximum Peak Systolic velocity: 116 

cm/sec: End-diastolic velocity 18 cm/sec.



2. Carotid Bifurcation:  plaque formation. Maximum Peak Systolic 

velocity: 100 cm/sec: End-diastolic velocity 10 cm/sec.



3. Internal Carotid Artery:      Plaque description:  



3.1. Proximal Segment: Peak systolic velocity 62 cm/sec: 

End-diastolic velocity 14 cm/sec - % stenosis 0-15%



3.2. Middle Segment:    Peak systolic velocity 69 cm/sec: 

End-diastolic velocity 15 cm/sec - % stenosis 0-15%



3.3. Distal Segment:     Peak systolic velocity 73 cm/sec: 

End-diastolic velocity 22 cm/sec - % stenosis 0-15%



4. External Carotid Artery: No significant focal plaque formation. 

Peak systolic velocity 91 cm/sec



5. ICA/CCA Ratio: 1.3



VERTEBRAL ARTERIES:

1. Right Vertebral Artery: The right vertebral artery flow direction 

is  antegrade.



2. Left Vertebral Artery:   The left vertebral artery flow direction 

is antegrade.



OTHER FINDINGS:



IMPRESSION:

RIGHT:  Duplex scan does not suggest hemodynamically significant 

stenosis of the right extracranial carotid arteries.  



LEFT:  Duplex scan does not suggest hemodynamically significant 

stenosis of the left extracranial carotid arteries.  



Tortuosity noted of bilateral common and internal carotid arteries.

## 2017-10-10 NOTE — CP.PCM.DIS
Provider





- Provider


Date of Admission: 


10/07/17 10:29





Attending physician: 


James Perez Jr, MD





Consults: 





Dr. Humphreys-cardiology


Dr. Bojorquez-orthopedic surgery


Dr. Crawford-nephrology


Time Spent in preparation of Discharge (in minutes): 35





Diagnosis





- Discharge Diagnosis


(1) Fall


Status: Acute   





(2) Multiple contusions


Status: Acute   Priority: High   





(3) UTI (urinary tract infection)


Status: Acute   Priority: High   





(4) Constipation


Status: Chronic   





(5) Anemia


Status: Acute   Priority: High   





(6) HTN (hypertension)


Status: Chronic   Priority: Medium   





(7) Hyperlipidemia


Status: Chronic   Priority: Medium   





(8) Prophylactic measure


Status: Acute   Priority: Medium   





Hospital Course





- Lab Results


Lab Results: 


 Micro Results





10/05/17 17:00   Blood-Venous   Blood Culture - Preliminary


                            NO GROWTH AFTER 4 DAYS


10/05/17 16:30   Blood-Venous   Blood Culture - Preliminary


                            NO GROWTH AFTER 4 DAYS





 Most Recent Lab Values











WBC  8.3 K/uL (4.8-10.8)   10/10/17  08:22    


 


RBC  3.18 Mil/uL (3.80-5.20)  L  10/10/17  08:22    


 


Hgb  8.6 g/dL (11.0-16.0)  L  10/10/17  08:22    


 


Hct  25.5 % (34.0-47.0)  L  10/10/17  08:22    


 


MCV  80.3 fL (81.0-99.0)  L  10/10/17  08:22    


 


MCH  27.0 pg (27.0-31.0)   10/10/17  08:22    


 


MCHC  33.7 g/dL (33.0-37.0)   10/10/17  08:22    


 


RDW  14.6 % (11.5-14.5)  H  10/10/17  08:22    


 


Plt Count  166 K/uL (130-400)   10/10/17  08:22    


 


MPV  7.5 fL (7.2-11.7)   10/10/17  08:22    


 


Neut % (Auto)  68.8 % (50.0-75.0)   10/10/17  08:22    


 


Lymph % (Auto)  19.9 % (20.0-40.0)  L  10/10/17  08:22    


 


Mono % (Auto)  10.4 % (0.0-10.0)  H  10/10/17  08:22    


 


Eos % (Auto)  0.6 % (0.0-4.0)   10/10/17  08:22    


 


Baso % (Auto)  0.3 % (0.0-2.0)   10/10/17  08:22    


 


Neut #  5.7 K/uL (1.8-7.0)   10/10/17  08:22    


 


Lymph #  1.7 K/uL (1.0-4.3)   10/10/17  08:22    


 


Mono #  0.9 K/uL (0.0-0.8)  H  10/10/17  08:22    


 


Eos #  0.0 K/uL (0.0-0.7)   10/10/17  08:22    


 


Baso #  0.0 K/uL (0.0-0.2)   10/10/17  08:22    


 


Retic Count  1.8 % (0.5-1.5)  H  10/06/17  07:20    


 


PT  11.9 SECONDS (9.7-12.2)   10/05/17  07:59    


 


INR  1.1   10/05/17  07:59    


 


APTT  23 SECONDS (21-34)   10/05/17  07:59    


 


Sodium  138 mmol/L (132-148)   10/10/17  08:22    


 


Potassium  4.0 mmol/L (3.6-5.2)   10/10/17  08:22    


 


Chloride  103 mmol/L ()   10/10/17  08:22    


 


Carbon Dioxide  23 mmol/L (22-30)   10/10/17  08:22    


 


Anion Gap  17  (10-20)   10/10/17  08:22    


 


BUN  21 mg/dL (7-17)  H  10/10/17  08:22    


 


Creatinine  1.5 mg/dL (0.7-1.2)  H  10/10/17  08:22    


 


Est GFR ( Amer)  41   10/10/17  08:22    


 


Est GFR (Non-Af Amer)  34   10/10/17  08:22    


 


POC Glucose (mg/dL)  130 mg/dL ()  H  10/10/17  11:06    


 


Random Glucose  86 mg/dL ()   10/10/17  08:22    


 


Calcium  8.7 mg/dl (8.6-10.4)   10/10/17  08:22    


 


Phosphorus  3.0 mg/dL (2.5-4.5)   10/10/17  08:22    


 


Magnesium  1.9 mg/dL (1.6-2.3)   10/10/17  08:22    


 


Iron  13 ug/dL ()  L  10/05/17  16:58    


 


TIBC  236 ug/dL (250-450)  L  10/05/17  16:58    


 


% Saturation  5  (20-55)  L  10/05/17  16:58    


 


Transferrin  158.31 mg/dL (206-381)  L  10/06/17  07:20    


 


Ferritin  197.0 ng/mL  10/06/17  07:20    


 


Total Bilirubin  1.0 mg/dL (0.2-1.3)   10/10/17  08:22    


 


AST  35 U/L (14-36)   10/10/17  08:22    


 


ALT  48 U/L (9-52)   10/10/17  08:22    


 


Alkaline Phosphatase  41 U/L ()   10/10/17  08:22    


 


Total Creatine Kinase  586 U/L ()  H  10/05/17  16:58    


 


Troponin I  0.0500 ng/mL (0.00-0.120)   10/06/17  00:18    


 


Total Protein  5.9 g/dL (6.3-8.3)  L  10/10/17  08:22    


 


Albumin  2.9 g/dL (3.5-5.0)  L  10/10/17  08:22    


 


Globulin  3.0 gm/dL (2.2-3.9)   10/10/17  08:22    


 


Albumin/Globulin Ratio  1.0  (1.0-2.1)   10/10/17  08:22    


 


Triglycerides  159 mg/dL (0-149)  H  10/06/17  07:20    


 


Cholesterol  119 mg/dL (0-199)   10/06/17  07:20    


 


LDL Cholesterol Direct  39 mg/dL (0-129)   10/06/17  07:20    


 


HDL Cholesterol  52 mg/dL (30-70)   10/06/17  07:20    


 


Vitamin B12  338 pg/mL (239-931)   10/06/17  07:20    


 


25-OH Vitamin D Total  34.4 NG/ML (30.0-100.0)   10/07/17  13:59    


 


Folate  > 20.0 ng/mL  10/06/17  07:20    


 


Free T4  1.21 ng/dL (0.78-2.19)   10/06/17  07:20    


 


TSH 3rd Generation  0.71 mIU/L (0.46-4.68)   10/06/17  07:20    


 


Urine Color  Yellow  (YELLOW)   10/05/17  11:22    


 


Urine Clarity  Clear  (Clear)   10/05/17  11:22    


 


Urine pH  5.0  (5.0-8.0)   10/05/17  11:22    


 


Ur Specific Gravity  1.014  (1.003-1.030)   10/05/17  11:22    


 


Urine Protein  1+ mg/dL (NEGATIVE)  H  10/05/17  11:22    


 


Urine Glucose (UA)  Normal mg/dL (Normal)   10/05/17  11:22    


 


Urine Ketones  Negative mg/dL (NEGATIVE)   10/05/17  11:22    


 


Urine Blood  1+  (NEGATIVE)  H  10/05/17  11:22    


 


Urine Nitrate  Negative  (NEGATIVE)   10/05/17  11:22    


 


Urine Bilirubin  Negative  (NEGATIVE)   10/05/17  11:22    


 


Urine Urobilinogen  Normal mg/dL (0.2-1.0)   10/05/17  11:22    


 


Ur Leukocyte Esterase  3+ Tunde/uL (Negative)  H  10/05/17  11:22    


 


Urine WBC (Auto)  12 /hpf (0-5)  H  10/05/17  11:22    


 


Urine RBC (Auto)  8 /hpf (0-3)  H  10/05/17  11:22    


 


Ur Squamous Epith Cells  1 /hpf (0-5)   10/05/17  11:22    


 


Stool Occult Blood  Negative  (NEGATIVE)   10/08/17  03:03    


 


Blood Type  A POSITIVE   10/05/17  07:59    


 


Antibody Screen  Negative   10/05/17  07:59    














- Hospital Course


Hospital Course: 





On admission:


"Patient is a 71 year old female with a history of HTN, hyperlipidemia, Kidney 

transplant is here because she was found on the floor by her daughter whom she 

lives.  Patient's daughter says the patient was unconscious and was unable to 

arouse patient for about 30 units.  Patient does not remember why she was on 

the floor not does she remember falling.  She says she was been having right 

sided chest pain, shoulder pain, and right lower extremity pain and weakness 

since falling 3 days ago.  She has extensive bruising on the right side of her 

face and legs.  She denies any changes in vision, hearing, feeling lightheaded, 

nausea, vomiting, diarrhea, difficulty breathing, wheezing, palpitations, 

anxiety, depression, numbness tingling, or memory loss.   She was seen in the 

ED about 3 days ago for a fall, they did x:rays of knee, shoulder, head and 

cervical spine CT which were negative.  "





Hospital Course:


Patient was admitted for syncopal episode.


Cardiologist Dr. Humphreys was consulted.


Nephrologist Dr. Crawford consulted.


On admission, EKG showed sinus arrhythmia with premature atrial contractions. 

Troponins and repeats were unremarkable.


Patient on telemetry monitor had resolution of premature atrial contractions.


Patient underwent tilt table test which was unremarkable.


Carotid ultrasound was also unremarkable.


Orthopedic surgeon Dr. Bojorquez was consulted for the right shoulder pain. Per 

orthopedic team, patient placed in an immobilizer brace for the right knee, and 

they recommended rehab for the shoulder.


Patient stable for discharge to [facility] per Dr. Humphreys and Dr. Perez.





This is a summary of the hospital course. For more information, refer to the 

medical records.





Discharge Exam





- Head Exam


Head Exam: NORMOCEPHALIC.  absent: ATRAUMATIC (bruising on the right side of 

the face with improving right periorbital swelling)





- Eye Exam


Eye Exam: EOMI, PERRL





- ENT Exam


ENT Exam: Mucous Membranes Moist





- Respiratory Exam


Respiratory Exam: Chest Wall Tenderness (s/p multiple falls), Clear to PA & 

Lateral.  absent: Rales, Rhonchi, Wheezes, Respiratory Distress





- Cardiovascular Exam


Cardiovascular Exam: REGULAR RHYTHM, +S1, +S2





- GI/Abdominal Exam


GI & Abdominal Exam: Normal Bowel Sounds, Soft.  absent: Tenderness





- Extremities Exam


Extremities exam: joint swelling (right knee swelling and tenderness), pedal 

edema (trace bilaterally)


Additional comments: 





ecchymosis and soft tissue swelling of right lower extremity, skin warm to 

touch. Immobilizer brace in place





- Neurological Exam


Neurological exam: Alert, Oriented x3





- Psychiatric Exam


Psychiatric exam: Normal Affect, Normal Mood





- Skin


Skin Exam: Dry, Intact, Warm





Discharge Plan





- Discharge Medications


Prescriptions: 


Ciprofloxacin HCl [Cipro] 250 mg PO BID 7 Days #14 tablet





- Follow Up Plan


Condition: STABLE


Disposition: REHAB FACILITY/REHAB UNIT


Instructions:  Urinary Tract Infection in Women (DC), Syncope (DC), Fall 

Prevention for Older Adults (GEN), Anemia (DC), Fall Prevention (DC)


Referrals: 


Jorden Humphreys MD [Staff Provider] - 


James Perez Jr., MD [Medical Doctor] -

## 2017-10-10 NOTE — CP.PCM.PN
Subjective





- Date & Time of Evaluation


Date of Evaluation: 10/10/17


Time of Evaluation: 11:51





- Subjective


Subjective: 





seen and examined





DEnies any nausea vomiting chest pain sob dizziness headache confusion fevers 

chills rash





Objective





- Vital Signs/Intake and Output


Vital Signs (last 24 hours): 


 











Temp Pulse Resp BP Pulse Ox


 


 98.8 F   82   20   127/59 L  94 L


 


 10/10/17 08:38  10/10/17 07:00  10/10/17 07:00  10/10/17 07:00  10/10/17 07:00








Intake and Output: 


 











 10/10/17 10/10/17





 06:59 18:59


 


Intake Total 150 


 


Balance 150 














- Medications


Medications: 


 Current Medications





Acetaminophen (Tylenol 325mg Tab)  650 mg PO Q6 PRN


   PRN Reason: Pain, moderate (4-7)


   Last Admin: 10/10/17 09:31 Dose:  650 mg


Amlodipine Besylate (Norvasc)  5 mg PO Q24H [facility]


   Last Admin: 10/09/17 17:55 Dose:  5 mg


Ciprofloxacin (Cipro)  250 mg PO BID [facility]


   Stop: 10/16/17 18:01


   Last Admin: 10/10/17 09:28 Dose:  250 mg


Cyclosporine (Cyclosporine)  50 mg PO HS [facility]


   Last Admin: 10/09/17 22:38 Dose:  50 mg


Cyclosporine (Cyclosporine)  75 mg PO DAILY [facility]


   Last Admin: 10/10/17 09:32 Dose:  75 mg


Docusate Sodium (Colace)  100 mg PO BID [facility]


   Last Admin: 10/10/17 09:24 Dose:  100 mg


Famotidine (Pepcid)  20 mg PO DAILY [facility]


   Last Admin: 10/10/17 09:28 Dose:  20 mg


Guaifenesin (Robitussin)  100 mg PO Q4H PRN


   PRN Reason: Cough


   Last Admin: 10/10/17 05:54 Dose:  100 mg


Lidocaine (Lidoderm)  1 ea TD DAILY [facility]


   Last Admin: 10/10/17 09:27 Dose:  1 ea


Metoprolol Succinate (Toprol Xl)  50 mg PO BID [facility]


   Last Admin: 10/10/17 09:28 Dose:  50 mg


Mycophenolate Mofetil (Cellcept)  500 mg PO BID [facility]


   Last Admin: 10/10/17 09:29 Dose:  500 mg


Omega-3-Acid Ethyl Esters (Lovaza)  1 gm PO BID [facility]


   Last Admin: 10/10/17 09:29 Dose:  1 gm


Prednisone (Prednisone Tab)  5 mg PO DAILY [facility]


   Last Admin: 10/10/17 09:28 Dose:  5 mg


Rosuvastatin Calcium (Crestor)  10 mg PO [facility]


Trimethoprim/Sulfamethoxazole (Bactrim Ss Tab)  1 tab PO DAILY [facility]


   Last Admin: 10/10/17 09:28 Dose:  1 tab











- Labs


Labs: 


 





 10/10/17 08:22 





 10/10/17 08:22 





 











PT  11.9 SECONDS (9.7-12.2)   10/05/17  07:59    


 


INR  1.1   10/05/17  07:59    


 


APTT  23 SECONDS (21-34)   10/05/17  07:59    














Assessment and Plan


(1) Renal failure treated with peritoneal dialysis


Status: Acute   





(2) Anemia


Status: Acute   





(3) Fall at home


Status: Acute   





(4) Syncope and collapse


Status: Acute   





(5) UTI (urinary tract infection)


Status: Acute   





(6) History of kidney transplant


Status: Chronic

## 2017-11-21 NOTE — CT
PROCEDURE:  CT HEAD WITHOUT CONTRAST.



HISTORY:

Altered mental status 



COMPARISON:

10/05/2017 



TECHNIQUE:

Axial computed tomography images were obtained through the head/brain 

without intravenous contrast.  



Radiation dose:



Total exam DLP = 1613 mGy-cm.



This CT exam was performed using one or more of the following dose 

reduction techniques: Automated exposure control, adjustment of the 

mA and/or kV according to patient size, and/or use of iterative 

reconstruction technique.



FINDINGS:



HEMORRHAGE:

No intracranial hemorrhage. 



BRAIN:

No mass effect or edema.  Scattered focal lucencies in the 

subcortical and periventricular white matter suggestive for chronic 

microvascular ischemic change.  Chronic ischemic changes in the left 

external capsule, stable. 



VENTRICLES:

Unremarkable. No hydrocephalus. 



CALVARIUM:

Unremarkable.



PARANASAL SINUSES:

Unremarkable as visualized. No significant inflammatory changes.



MASTOID AIR CELLS:

Unremarkable as visualized. No inflammatory changes.



OTHER FINDINGS:

None.



IMPRESSION:

Chronic microvascular ischemic changes. 



If focal neurologic deficit persists, consider MRI.

## 2017-11-21 NOTE — C.PDOC
History Of Present Illness


71 year old female was sent from [facility] to ED via EMS for evaluation. As per 

nursing home nurse, while attempting to wake up patient for morning medication, 

patient would not wake up which prompted concern. EMS states patient was awake, 

alert, and had no physical complaints. Upon arrival to ED, patient is awake, 

alert, and not sure why she was sent to ED. Patient denies nausea, vomiting, 

fever, chills, chest pain, shortness of breath, or other complaints at this 

time. 


Time Seen by Provider: 17 07:12


Chief Complaint (Nursing): Medical Clearance


History Per: Patient, EMS


History/Exam Limitations: no limitations


Pain Scale Rating Of: 0


Recent travel outside of the [place]: No


Additional History Per: Nursing Home





Past Medical History


Reviewed: Historical Data, Nursing Documentation, Vital Signs


Vital Signs: 


 Last Vital Signs











Temp  99 F   17 12:27


 


Pulse  82   17 12:27


 


Resp  16   17 12:27


 


BP  148/80   17 12:27


 


Pulse Ox  93 L  17 16:58














- Medical History


PMH: HTN, Chronic Kidney Disease





- POWWOW Procedures








MEASURE OF ARTERIAL PRESSURE, PERIPHERAL, EXTERN APPROACH (10/07/17)


MEASUREMENT OF CARDIAC RHYTHM, EXTERNAL APPROACH (10/07/17)








Family History: States: Unknown Family Hx





- Social History


Hx Alcohol Use: No


Hx Substance Use: No





- Immunization History


Hx Tetanus Toxoid Vaccination: No


Hx Influenza Vaccination: Yes


Hx Pneumococcal Vaccination: Yes (also recvd TB)





Review Of Systems


Constitutional: Negative for: Fever, Chills


Cardiovascular: Negative for: Chest Pain, Palpitations


Respiratory: Negative for: Cough, Shortness of Breath


Gastrointestinal: Negative for: Nausea, Vomiting, Abdominal Pain, Diarrhea


Neurological: Negative for: Weakness, Numbness





Physical Exam





- Physical Exam


Appears: Non-toxic, No Acute Distress, Other (patient is elderly and frail )


Skin: Warm, Dry, No Rash


Head: Atraumatic, Normacephalic, No Tenderness


Eye(s): bilateral: Normal Inspection, PERRL, EOMI


Oral Mucosa: Moist


Neck: Supple


Chest: Symmetrical, No Deformity


Cardiovascular: Rhythm Regular, No Murmur


Respiratory: No Rales, No Rhonchi, No Wheezing, Other (clear to auscultation 

bilaterally )


Gastrointestinal/Abdominal: Soft, No Tenderness, No Distention, No Guarding, No 

Rebound


Extremity: Normal ROM, No Tenderness, No Pedal Edema


Neurological/Psych: Oriented x3





ED Course And Treatment





- Laboratory Results


Result Diagrams: 


 17 08:21





 17 08:21


ECG: Interpreted By Me, Viewed By Me


ECG Rhythm: Sinus Rhythm, R BBB


Interpretation Of ECG: No acute changes from prior EKG on 10/05/2017


Rate From EC


O2 Sat by Pulse Oximetry: 93 (RA)





- CT Scan/US


  ** Head CT


Other Rad Studies (CT/US): Read By Radiologist





  ** Head CT W/O Contrast


Other Rad Studies (CT/US): Read By Radiologist, Radiology Report Reviewed


CT/US Interpretation: FINDINGS:  HEMORRHAGE:  No intracranial hemorrhage.  BRAIN

:  No mass effect or edema.  Scattered focal lucencies in the subcortical and 

periventricular white matter suggestive for chronic microvascular ischemic 

change.  Chronic ischemic changes in the left external capsule, stable.  

VENTRICLES:  Unremarkable. No hydrocephalus.  CALVARIUM:  Unremarkable.  

PARANASAL SINUSES:  Unremarkable as visualized. No significant inflammatory 

changes.  MASTOID AIR CELLS:  Unremarkable as visualized. No inflammatory 

changes.  OTHER FINDINGS:  None.  IMPRESSION:  Chronic microvascular ischemic 

changes.  If focal neurologic deficit persists, consider MRI.


Progress Note: Head CT, EKG, and blood work was ordered.





Medical Decision Making


Medical Decision Making: 


Impression: AMS? this morning, currently AxOx3


07:30 spoke with Dr OLIVERIO El who wants CT head and bloodwork for clearance


Plan: EKG, Labs and CT head


All diagnostics reviewed. 


Case discussed with Dr OLIVERIO El who wants ED observation and patient was admitted





Of note patient's daughter arrived to ED and stated she does not want her 

mother admitted to hospital and wants to take her home. 


Contact Dr OLIVERIO El who comes to ED to evaluate patient. Patient daughter will 

sign patient out AMA.  





Disposition





- Disposition


Referrals: 


Lisa El MD [Staff Provider] - 


Disposition: AGAINST MEDICAL ADVICE


Disposition Time: 12:25


Condition: STABLE


Forms:  [facility] (English)





- POA


Present On Arrival: None





- Clinical Impression


Clinical Impression: 


 Near syncope, Left against medical advice








- PA / NP / Resident Statement


MD/DO has reviewed & agrees with the documentation as recorded.





- Scribe Statement


The provider has reviewed the documentation as recorded by the Scribe





Concha Porter





All medical record entries made by the Scribe were at my direction and 

personally dictated by me. I have reviewed the chart and agree that the record 

accurately reflects my personal performance of the history, physical exam, 

medical decision making, and the department course for this patient. I have 

also personally directed, reviewed, and agree with the discharge instructions 

and disposition.

## 2017-11-22 NOTE — CARD
--------------- APPROVED REPORT --------------





EKG Measurement

Heart [id number]

IN [id number]

[id number]

[id number]

[id number]



<Conclusion>

Normal sinus rhythm

Right bundle branch block

Abnormal ECG

## 2018-05-15 NOTE — C.PDOC
History Of Present Illness


72-year-old female brought to the emergency department by ambulance after 

daughter witnessed her having seizure-like activity this morning.  She states 

her mother woke up this morning, and started having shaking of her body, was 

rigid at times - episodes lasted approx 1 week, and when patient woke up she 

was confused for a period of time.  As per daughter, patient was feeling dizzy (

light-headed).  Patient has no history of seizure disorder.  Patient denies 

chest pain, shortness of breath, cough, fever, abdominal pain, nausea/vomiting/

diarrhea, extremity weakness, visual changes, sensory changes, slurred speech, 

or any other associated symptoms. 


Time Seen by Provider: 05/15/18 07:26


Chief Complaint (Nursing): Seizure


History Per: Patient, Family


History/Exam Limitations: no limitations


Number Of Seizures: One


Length Of Seizures (Duration): Minutes


Quality Of Seizure: Generalized





Past Medical History


Reviewed: Historical Data, Nursing Documentation, Vital Signs


Vital Signs: 


 Last Vital Signs











Temp  98.9 F   18 15:53


 


Pulse  56 L  18 16:00


 


Resp  20   18 15:53


 


BP  110/64   18 15:53


 


Pulse Ox  95   18 15:53














- Medical History


PMH: HTN, Chronic Kidney Disease





- [facility] Procedures








MEASURE OF ARTERIAL PRESSURE, PERIPHERAL, EXTERN APPROACH (10/07/17)


MEASUREMENT OF CARDIAC RHYTHM, EXTERNAL APPROACH (10/07/17)








Family History: States: No Known Family Hx





- Social History


Hx Alcohol Use: No


Hx Substance Use: No





- Immunization History


Hx Tetanus Toxoid Vaccination: No


Hx Influenza Vaccination: Yes


Hx Pneumococcal Vaccination: Yes (also recvd TB)





Review Of Systems


Constitutional: Negative for: Fever


Cardiovascular: Negative for: Chest Pain, Palpitations


Respiratory: Negative for: Cough, Shortness of Breath


Gastrointestinal: Negative for: Nausea, Vomiting, Diarrhea


Musculoskeletal: Negative for: Back Pain


Neurological: Positive for: Seizures.  Negative for: Weakness, Numbness, Change 

in Speech, Confusion, Altered Mental Status, Headache, Dizziness





Physical Exam





- Physical Exam


Appears: Well, Non-toxic, No Acute Distress


Skin: Normal Color, Warm, Dry, No Rash


Head: Atraumatic, Normacephalic


Eye(s): bilateral: Normal Inspection, PERRL, EOMI, Other (no nystagmus)


Oral Mucosa: Moist


Neck: Normal, Normal ROM


Chest: Symmetrical


Cardiovascular: Rhythm Regular, Murmur (3/6 holosystolic)


Respiratory: Normal Breath Sounds, No Accessory Muscle Use, No Rales, No Rhonchi

, No Wheezing


Gastrointestinal/Abdominal: Normal Exam, Bowel Sounds, Soft, No Tenderness


Extremity: Normal ROM


Neurological/Psych: Oriented x3





ED Course And Treatment





- Laboratory Results


Result Diagrams: 


 18 07:13





 18 07:13


ECG: Interpreted By Me, Viewed By Me


ECG Rhythm: Sinus Rhythm


ECG Interpretation: No Acute Changes


Rate From EC (bpm)


O2 Sat by Pulse Oximetry: 96 (RA)


Pulse Ox Interpretation: Normal





- CT Scan/US


  ** ct head


Other Rad Studies (CT/US): Read By Radiologist, Radiology Report Reviewed


CT/US Interpretation: Accession No. : [id number].  Patient Name / ID : 

ANTONIO CABRERA  / [id number].  Exam Date : 05/15/2018 08:19:00 ( Approved ).  

Study Comment :  Sex / Age : F  / [id number].  Creator : Ruben Wesley MD.  

Dictator : Ruben Wesley MD.   :  Approver : Ruben Wesley MD.  Approver2 :  Report Date : 05/15/2018 08:50:56.  My Comment :  ************

***********************************************************************.  

PROCEDURE:  CT HEAD WITHOUT CONTRAST.  HISTORY:  SEIZURE NEW ONSET.  COMPARISON

:  2017.  TECHNIQUE:  Axial computed tomography images were obtained 

through the head/brain without intravenous contrast.  Radiation dose:  Total 

exam DLP = 836 mGy-cm.  This CT exam was performed using one or more of the 

following dose reduction techniques: Automated exposure control, adjustment of 

the mA and/or kV according to patient size, and/or use of iterative 

reconstruction technique.  FINDINGS:  HEMORRHAGE:  No intracranial hemorrhage.  

BRAIN:  No mass effect or edema.  Scattered focal lucencies in the subcortical 

and periventricular white matter suggestive for chronic microvascular ischemic 

change. . Focal hypoattenuation measuring 7 millimeters seen within the right 

parietal subcortical white matter on series 4, image 33 suggestive for age-

indeterminate ischemic change. Stable focal area of low attenuation seen within 

the left external capsule suggestive for chronic ischemic change.  VENTRICLES:  

Unremarkable. No hydrocephalus.  CALVARIUM:  Unremarkable.  PARANASAL SINUSES:  

Unremarkable as visualized. No significant inflammatory changes.  MASTOID AIR 

CELLS:  Unremarkable as visualized. No inflammatory changes.  OTHER FINDINGS:  

Intracranial atherosclerotic calcifications.  6 millimeter lobulated 

calcification seen within the posterior left cerebellum, not significantly 

changed.  IMPRESSION:  No acute intracranial hemorrhage.  Focal hypoattenuation 

measuring 7 millimeter seen within the right parietal subcortical white matter 

on series 4, image 33 suggestive for age-indeterminate ischemic change. 

Correlation with MRI may be helpful clinically indicated.  Additional stable 

focal areas of chronic ischemic and microvascular ischemic changes as described 

above.


Progress Note: Bloodwork, CT Head, EKG, Chest X-Ray and UA ordered and 

reviewed. Patient given PO Aspirin.  MRI ordered, however patient has spinal 

stimulator.  Daughter trying to find card for spinal stimulator to determine if 

it is MRI safe or not.  For the time being, radiologist recommends against MRI 

at this time.





- Physician Consult Information


Physician Contacted: James Perez Jr.


Outcome Of Conversation: Discussed patient with Dr. Perez, agrees with admission 

to his service for new onset seizure, possible CVA/TIA.





NIHSS Stroke Scale





- Date/Time Evaluation Performed


Date Performed: 05/15/18


Time Performed: 07:35


When Was NIHSS Performed: Baseline





- How Severe is the Stoke


Level of Consciousness: 0=Alert


LOC to Questions: 0=Both comments correct


LOC to commands: 0=Obeys both correctly


Best Gaze: 0=Normal


Visual: 0=No visual loss


Facial: 0=Normal


Motor Arm - Left: 0=No drift


Motor Arm - Right: 0=No drift


Motor Leg - Left: 0=No drift


Motor Leg - Right: 0=No drift


Limb Ataxia: 0=Absent


Sensory: 0=Normal


Best Language: 0=No aphasia


Dysarthia: 0=Normal articulation


Extinction & Inattention (Neglect): 0=Normal, no object


Score: 0


Severity Of Stroke: 0= No Stroke





rTPA Inclusion/Exclusion





- Refusal of Treatment


Patient Refused Treatment: No





- Inclusion Criteria for Altepase


Patient is 18 years or Older: Yes


The Clinical Diagnosis of Ischemic Stroke That is Causing a Potentially 

Disabling Neurological Deficit: No


Time of Onset is Well Established to be Less Than 270 Minute Before Treatment 

Would Begin: Yes


Risk/Benefit Discussed With Patient/Family Member Present: No





Disposition





- Disposition


Disposition: HOSPITALIZED


Disposition Time: 09:46


Condition: STABLE





- Clinical Impression


Clinical Impression: 


 New onset seizure








- Scribe Statement


The provider has reviewed the documentation as recorded by the Scribe (Jeanette Herbert)








All medical record entries made by the Scribe were at my direction and 

personally dictated by me. I have reviewed the chart and agree that the record 

accurately reflects my personal performance of the history, physical exam, 

medical decision making, and the department course for this patient. I have 

also personally directed, reviewed, and agree with the discharge instructions 

and disposition.





Decision To Admit





- Pt Status Changed To:


Hospital Disposition Of: Inpatient





- Admit Certification


Admit to Inpatient:: After my assessment, the patient will require 

hospitalization for at least two midnights.  This is because of the severity of 

symptoms shown, intensity of services needed, and/or the medical risk in this 

patient being treated as an outpatient.





- InPatient:


Physician Admission Certification: I certify that this patient requires 2 or 

more midnights of care for the following reason:: see notes





- .


Bed Request Type: Telemetry


Admitting Physician: James Perez Jr.


Patient Diagnosis: 


 New onset seizure

## 2018-05-15 NOTE — VASCLAB
PROCEDURE:  Lower Extremity Venous Duplex Exam.



HISTORY:

Bilateral lower extremity tenderness 



PRIORS:

None. 



TECHNIQUE:

Bilateral common femoral, femoral, popliteal and posterior tibial, 

peroneal and great saphenous veins were evaluated. Flow was assessed 

with color Doppler, compressibility, assessment of phasic flow and 

augmentation response.



Report prepared by   LISA Mak



FINDINGS:



RIGHT:

1. Common Femoral Vein: 



1.1. Compressibility - Fully compressible: Thrombus -  None : Flow - 

Phasic: Augmentation -Normal: Reflux - None.



2. Femoral Vein: (proximal and mid views only)



2.1. Compressibility - Fully compressible: Thrombus -  None : Flow - 

Phasic: Augmentation -Normal: Reflux - None.



3. Popliteal Vein: 



3.1. Compressibility - Fully compressible: Thrombus - None :  Flow - 

Phasic: Augmentation -Normal: Reflux - None.



4. Posterior Tibial Vein: 



4.1. Compressibility - Fully compressible: Thrombus -  None: Flow - 

Phasic: Augmentation -Normal: Reflux - None.



5. Peroneal Vein:



5.1. Unable to visualize due to swelling.



6. Great Saphenous Vein:

6.1. Compressibility - Fully compressible: Thrombus - None: Flow - 

Phasic: Augmentation - Normal: Reflux - None.





LEFT:

1. Common Femoral Vein:



1.1.  Compressibility - Fully compressible: Thrombus -  None: Flow - 

Phasic: Augmentation -Normal: Reflux - None.



2. Femoral Vein:



2.1.  Compressibility - Fully compressible: Thrombus -  None: Flow - 

Phasic: Augmentation -Normal: Reflux - None.



3. Popliteal Vein:



3.1.  Compressibility - Fully compressible: Thrombus -  None : Flow - 

Phasic: Augmentation -Normal: Reflux - None.



4. Posterior Tibial Vein:



4.1.  Compressibility - Fully compressible: Thrombus -  None: Flow - 

Phasic: Augmentation -Normal: Reflux - None.



5. Peroneal Vein:



5.1. Unable to visualize due to swelling.



6. Great Saphenous Vein:

6.1.  Compressibility - Fully compressible: Thrombus -  None: Flow - 

Phasic: Augmentation - Normal: Reflux - None.





OTHER FINDINGS:  Right: Fluid filled anechoic mass noted behind the 

right knee area, measuring 3.60 x 1.47 c.m.,  baker's cyst.



IMPRESSION:

Right: 



No evidence of deep or superficial vein thrombosis of the right lower 

extremity. Normal valve function noted of the right side.     



Left: 



No evidence of deep or superficial vein thrombosis of the left lower 

extremity. Normal valve function noted of the left side.     



Technically difficult/limited exam due to patient body habitus, and 

patient intolerance to compressions.

## 2018-05-15 NOTE — CP.PCM.HP
History of Present Illness





- History of Present Illness


History of Present Illness: 





Code status: full code


Advanced directives: denies


Healthcare proxy: daughter Yaya Jackson [phone]





HPI: 72 year old female with past medical history of HLD, HTN, ESRD secondary 

to HTN status post right kidney transplant in  who presented to the ED for 

evaluation of seizure-like activity. Patient lives with her daughter, and the 

daughter was awoken to the patient making sounds. Daughter observed the patient 

in the chair shaking and "in spasm" with foam coming from her mouth. Daughter 

states that afterwards patient was unresponsive for 5 minutes, at which point 

she called 9-1-1. Patient states she normally sleeps in her chair because she 

"is afraid to be in bed" since her bedroom is on a separate level of their home 

as the kitchen and bathroom and would require her to use stairs. She does not 

remember what happened and "woke up with everyone around me." Both patient and 

daughter report that this is the first occurrence, denies history of seizures. 

Patient reports dizziness with change of position and cough. Patient denies 

fever/chills, chest pain, shortness of breath, abdominal pain, nausea/vomiting/

diarrhea/constipation. 





PMD: Dr. Crawford


-Per patient and family, patient sees nephrologist Dr. Crawford every 3 months

, and "she takes care of everything." 


Past medical history: HTN, HLD, ESRD secondary to HTN


Past surgical history: bilateral cataracts (), laminectomy x2 L2-L3 and L4-

L5 (, ), spinal stimulator placement ()


Medications: Metoprolol 50mg PO BID, Norvasc 5mg PO QHS, Cellcept 500mg PO BID, 

Cyclosporine 75mg PO QAM and 50mg PO QHS, Prednisone 5mg PO QD, Bactrim DS 1 

tab PO QD, Lovaza 1000mg 2 tabs PO QD, Tramadol 50mg PO BID


Allergies: amoxicillin (swelling, dyspnea)


Family history: son  at age 45, history of Wiskott-Darryn syndrome


Social history: light smoker 40+ years ago; rare alcohol drinker; denies drug 

use; lives with daughter








Present on Admission





- Present on Admission


Any Indicators Present on Admission: No





Review of Systems





- Constitutional


Constitutional: absent: Chills, Fever





- EENT


Eyes: absent: Blurred Vision


Ears: Dizziness





- Cardiovascular


Cardiovascular: absent: Chest Pain, Dyspnea, Palpitations





- Respiratory


Respiratory: Cough.  absent: Dyspnea, Wheezing





- Gastrointestinal


Gastrointestinal: absent: Constipation, Diarrhea, Nausea, Vomiting





- Genitourinary


Genitourinary: absent: Dysuria





- Musculoskeletal


Musculoskeletal: absent: Numbness





- Neurological


Neurological: Dizziness.  absent: Headaches, Syncope, Tingling, Weakness





Past Patient History





- Infectious Disease


Hx of Infectious Diseases: None





- Past Social History


Smoking Status: Never Smoked





- CARDIAC


Hx Hypertension: Yes





- PULMONARY


Hx Respiratory Disorders: No





- NEUROLOGICAL


Hx Neurological Disorder: No





- HEENT


Hx Cataracts: Yes





- RENAL


Hx Chronic Kidney Disease: Yes





- ENDOCRINE/METABOLIC


Hx Endocrine Disorders: No





- MUSCULOSKELETAL/RHEUMATOLOGICAL


Hx Falls: Yes





- GASTROINTESTINAL


Hx Gastrointestinal Disorders: No





- PSYCHIATRIC


Hx Substance Use: No





- SURGICAL HISTORY


Hx Surgeries: Yes


Other/Comment: Rt. kidney transplant Aug. 3,2003.  L AVF





- ANESTHESIA


Hx Anesthesia: Yes


Hx Anesthesia Reactions: No





Meds


Allergies/Adverse Reactions: 


 Allergies











Allergy/AdvReac Type Severity Reaction Status Date / Time


 


amoxicillin AdvReac Severe  Verified 17 07:04














Physical Exam





- Constitutional


Appears: Non-toxic, No Acute Distress





- Head Exam


Head Exam: ATRAUMATIC, NORMAL INSPECTION





- Eye Exam


Eye Exam: EOMI, Normal appearance





- ENT Exam


ENT Exam: Mucous Membranes Moist





- Neck Exam


Neck exam: Positive for: Full Rom.  Negative for: Lymphadenopathy, Tenderness





- Respiratory Exam


Respiratory Exam: Clear to Auscultation Bilateral, NORMAL BREATHING PATTERN.  

absent: Rales, Rhonchi, Wheezes, Stridor





- Cardiovascular Exam


Cardiovascular Exam: REGULAR RHYTHM, RRR, +S1, +S2.  absent: JVD





- GI/Abdominal Exam


GI & Abdominal Exam: Normal Bowel Sounds, Soft.  absent: Tenderness





- Extremities Exam


Extremities exam: Positive for: tenderness, pedal pulses present.  Negative for

: pedal edema





- Neurological Exam


Neurological exam: Alert, CN II-XII Intact, Oriented x3





- Expanded Neurological Exam


  ** Expanded


Patient oriented to: person, place, time


Cerebellar Function: Finger to Nose: Normal


Sensory exam: Lower Extremity Light Touch: Normal, Upper Extremity Light Touch: 

Normal


Neuro motor strength exam: Left Upper Extremity: 5, Right Upper Extremity: 5, 

Left Lower Extremity: 5, Right Lower Extremity: 5


Coma Scale Eye Opening: SPONTANEOUS


Coma Scale Motor Response: OBEYS COMMANDS





- Psychiatric Exam


Psychiatric exam: Normal Affect, Normal Mood





- Skin


Skin Exam: Dry, Intact, Normal Color





Results





- Vital Signs


Recent Vital Signs: 





 Last Vital Signs











Temp  98 F   05/15/18 09:10


 


Pulse  78   05/15/18 09:10


 


Resp  18   05/15/18 09:10


 


BP  122/45 L  05/15/18 09:10


 


Pulse Ox  96   05/15/18 09:49














- Labs


Result Diagrams: 


 05/15/18 08:09





 05/15/18 08:48


Labs: 





 Laboratory Results - last 24 hr











  05/15/18 05/15/18 05/15/18





  07:53 08:09 08:09


 


WBC   6.8 


 


RBC   4.24 


 


Hgb   11.1 


 


Hct   33.4 L 


 


MCV   78.9 L D 


 


MCH   26.3 L 


 


MCHC   33.3 


 


RDW   16.2 H 


 


Plt Count   199 


 


MPV   7.6 


 


Neut % (Auto)   63.8 


 


Lymph % (Auto)   25.5 


 


Mono % (Auto)   9.2 


 


Eos % (Auto)   0.8 


 


Baso % (Auto)   0.7 


 


Neut # (Auto)   4.4 


 


Lymph # (Auto)   1.7 


 


Mono # (Auto)   0.6 


 


Eos # (Auto)   0.1 


 


Baso # (Auto)   0.0 


 


Sodium   


 


Potassium   


 


Chloride   


 


Carbon Dioxide   


 


Anion Gap   


 


BUN   


 


Creatinine   


 


Est GFR ( Amer)   


 


Est GFR (Non-Af Amer)   


 


POC Glucose (mg/dL)  94  


 


Random Glucose   


 


Calcium   


 


Total Bilirubin   


 


AST   


 


ALT   


 


Alkaline Phosphatase   


 


Total Creatine Kinase   


 


CK-MB (Mass)   


 


Troponin I   


 


Total Protein   


 


Albumin   


 


Globulin   


 


Albumin/Globulin Ratio   


 


Urine Color    Yellow


 


Urine Clarity    Clear


 


Urine pH    5.0


 


Ur Specific Gravity    1.013


 


Urine Protein    1+ H


 


Urine Glucose (UA)    Normal


 


Urine Ketones    Negative


 


Urine Blood    Negative


 


Urine Nitrate    Negative


 


Urine Bilirubin    Negative


 


Urine Urobilinogen    Normal


 


Ur Leukocyte Esterase    Trace


 


Urine RBC (Auto)    < 1














  05/15/18





  08:48


 


WBC 


 


RBC 


 


Hgb 


 


Hct 


 


MCV 


 


MCH 


 


MCHC 


 


RDW 


 


Plt Count 


 


MPV 


 


Neut % (Auto) 


 


Lymph % (Auto) 


 


Mono % (Auto) 


 


Eos % (Auto) 


 


Baso % (Auto) 


 


Neut # (Auto) 


 


Lymph # (Auto) 


 


Mono # (Auto) 


 


Eos # (Auto) 


 


Baso # (Auto) 


 


Sodium  144


 


Potassium  4.0


 


Chloride  107


 


Carbon Dioxide  25


 


Anion Gap  16


 


BUN  34 H


 


Creatinine  1.5 H


 


Est GFR ( Amer)  41


 


Est GFR (Non-Af Amer)  34


 


POC Glucose (mg/dL) 


 


Random Glucose  97


 


Calcium  9.2


 


Total Bilirubin  0.4


 


AST  25


 


ALT  26


 


Alkaline Phosphatase  58


 


Total Creatine Kinase  131


 


CK-MB (Mass)  2.32


 


Troponin I  0.0250


 


Total Protein  6.4


 


Albumin  3.6


 


Globulin  2.8


 


Albumin/Globulin Ratio  1.3


 


Urine Color 


 


Urine Clarity 


 


Urine pH 


 


Ur Specific Gravity 


 


Urine Protein 


 


Urine Glucose (UA) 


 


Urine Ketones 


 


Urine Blood 


 


Urine Nitrate 


 


Urine Bilirubin 


 


Urine Urobilinogen 


 


Ur Leukocyte Esterase 


 


Urine RBC (Auto) 














Assessment & Plan





- Assessment and Plan (Free Text)


Assessment: 





1.) New onset seizure 


- Neurology consult: Dr. Arora --> help appreciated


- Seizure precautions/Neuro Check q4h


- MRI unable to be completed due to spinal stimulator implant 


Imaging: 


- Head CT without contrast: No acute intracranial hemorrhage. Focal 

hypoattenuation measuring 7 millimeter seen within the right parietal 

subcortical white matter on series 4, image 33 suggestive for age-indeterminate 

ischemic change. Correlation with MRI may be helpful clinically indicated. 

Additional stable focal areas of chronic ischemic and microvascular ischemic 

changes as described above. 


- Head CT with contrast: No enhancing masses or collections. No evidence of 

unusual meningeal enhancement. Minor chronic periventricular white matter 

ischemic changes with scattered chronic bilateral basal nuclei lacunar type 

infarcts. Mild generalized volume loss. 


- Lower extremity venous duplex: No evidence of deep or superficial vein 

thrombosis of bilateral lower extremities. Technically difficult/limited exam 

due to patient body habitus, and patient intolerance to compressions. 


- Prolactin 21.5; f/u prolactin /16/18


- C-reactive Protein: 18.10


- ESR 24


- Mag 2.1; Phos 3.9


- f/u ionized calcium 


- Medications


* Aspirin 81mg PO QD


* NS at 100cc/hr





2.) History of renal transplant/ESRD


- Nephrology consult: Dr. Crawford --> help appreciated 


- HIV 1&2 antibody screen: negative


- f/u CMV, EBV results


- HIV negative 


- Medications 


* Cyclosporine 75mg PO QAM


* Cyclosporine 50mg PO QHS


* Cellcept 500mg PO BID


* Ultram 50mg PO BID PRN


* Bactrim DS 1 tab PO QD (prophylaxis s/p right kidney transplant) 





3.) History of HTN


- Continue home medication


* Norvasc 5mg PO QHS


- TSH 3.51; free T4 1.39





4.) History of HLD


- Continue home medication


* Lovaza 1gm PO BID





5.) Prophylaxis


- Heart healthy diet


- Heparin 5000u SC Q8H


- SCDs





Case discussed with Dr. Chris Gonsalves PGY-1

## 2018-05-15 NOTE — RAD
PROCEDURE:  CHEST RADIOGRAPH, 1 VIEW



HISTORY:

SOB



COMPARISON:

None available.



FINDINGS:



LUNGS:

Clear.



PLEURA:

No pneumothorax or pleural fluid seen.



CARDIOVASCULAR:

Normal.



OSSEOUS STRUCTURES:

No significant abnormalities.



VISUALIZED UPPER ABDOMEN:

Normal.



OTHER FINDINGS:

None. 



IMPRESSION:

No active disease.

## 2018-05-15 NOTE — CT
PROCEDURE:  CT HEAD WITHOUT CONTRAST.



HISTORY:

SEIZURE NEW ONSET



COMPARISON:

11/21/2017 



TECHNIQUE:

Axial computed tomography images were obtained through the head/brain 

without intravenous contrast.  



Radiation dose:



Total exam DLP = 836 mGy-cm.



This CT exam was performed using one or more of the following dose 

reduction techniques: Automated exposure control, adjustment of the 

mA and/or kV according to patient size, and/or use of iterative 

reconstruction technique.



FINDINGS:



HEMORRHAGE:

No intracranial hemorrhage. 



BRAIN:

No mass effect or edema.  Scattered focal lucencies in the 

subcortical and periventricular white matter suggestive for chronic 

microvascular ischemic change. . Focal hypoattenuation measuring 7 

millimeters seen within the right parietal subcortical white matter 

on series 4, image 33 suggestive for age-indeterminate ischemic 

change. Stable focal area of low attenuation seen within the left 

external capsule suggestive for chronic ischemic change. 



VENTRICLES:

Unremarkable. No hydrocephalus. 



CALVARIUM:

Unremarkable.



PARANASAL SINUSES:

Unremarkable as visualized. No significant inflammatory changes.



MASTOID AIR CELLS:

Unremarkable as visualized. No inflammatory changes.



OTHER FINDINGS:

Intracranial atherosclerotic calcifications.



6 millimeter lobulated calcification seen within the posterior left 

cerebellum, not significantly changed.



IMPRESSION:

No acute intracranial hemorrhage. 



Focal hypoattenuation measuring 7 millimeter seen within the right 

parietal subcortical white matter on series 4, image 33 suggestive 

for age-indeterminate ischemic change. Correlation with MRI may be 

helpful clinically indicated.



Additional stable focal areas of chronic ischemic and microvascular 

ischemic changes as described above.

## 2018-05-15 NOTE — CT
PROCEDURE:  CT HEAD WITH CONTRAST



HISTORY:

S/P seizure



COMPARISON:

Comparison made with prior noncontrast CT scan brain earlier same 

day. 



TECHNIQUE:

Axial computed tomography images were obtained through the head/brain 

with intravenous contrast. 



Contrast dose: 



Radiation dose:



Total exam DLP = 1081.11 mGy-cm.



This CT exam was performed using one or more of the following dose 

reduction techniques: Automated exposure control, adjustment of the 

mA and/or kV according to patient size, and/or use of iterative 

reconstruction technique.



FINDINGS:



HEMORRHAGE:

No acute parenchymal, subarachnoid or extra-axial hemorrhage. 



BRAIN:

The minor chronic periventricular white matter ischemic changes are 

less well seen on this study due to contrast enhancement. .  Small 

bilateral basal nuclei lacunar type infarcts are also less well seen. 



There are no enhancing parenchymal nor extra-axial masses or 

collections. No evidence of unusual meningeal enhancement. . 



Mild generalized volume loss. 



VENTRICLES:

No obstructive hydrocephalus 



CALVARIUM:

Unremarkable.



PARANASAL SINUSES:

Unremarkable as visualized. No significant inflammatory changes.



MASTOID AIR CELLS:

Unremarkable as visualized. No mastoid effusion. 



OTHER FINDINGS:

None.



IMPRESSION:

No enhancing masses or collections. No evidence of unusual meningeal 

enhancement. 



Minor chronic periventricular white matter ischemic changes with 

scattered chronic bilateral basal nuclei lacunar type infarcts. 



Mild generalized volume loss

## 2018-05-16 NOTE — CP.PCM.CON
History of Present Illness





- History of Present Illness


History of Present Illness: 





consult dictated 


NEW ONSET OF SEIZURE


Hx SNF ? MISSED STEPS  3 MONTHS AGO 


EXAM LEFT SUB CORTICAL DYSFUNCTION - INC DTR AND BABINSKI  RT


SMALL FIBER NEUROPATHY 


HEART MURMER


PLAN


EEG 


CAROTID/ECHO 


CARDIOLOGY CONSULT 


rX ASA 


NO AED IS NEEDED NOW - TRY TO FIND OUT THE CAUSE


FALL AND SEIZURE PRECATION 


NEEDS PSG AS AN OP 


PT 


THANKS    





Past Patient History





- Infectious Disease


Hx of Infectious Diseases: None





- Past Medical History & Family History


Past Medical History?: Yes





- Past Social History


Smoking Status: Never Smoked





- CARDIAC


Hx Hypertension: Yes





- PULMONARY


Hx Respiratory Disorders: No





- NEUROLOGICAL


Hx Neurological Disorder: No





- HEENT


Hx Cataracts: Yes





- RENAL


Hx Chronic Kidney Disease: Yes





- ENDOCRINE/METABOLIC


Hx Endocrine Disorders: No





- MUSCULOSKELETAL/RHEUMATOLOGICAL


Hx Falls: Yes





- GASTROINTESTINAL


Hx Gastrointestinal Disorders: No





- PSYCHIATRIC


Hx Substance Use: No





- SURGICAL HISTORY


Hx Surgeries: Yes


Other/Comment: Rt. kidney transplant Aug. 3,2003.  L [facility]





- ANESTHESIA


Hx Anesthesia: Yes


Hx Anesthesia Reactions: No





Meds


Allergies/Adverse Reactions: 


 Allergies











Allergy/AdvReac Type Severity Reaction Status Date / Time


 


amoxicillin AdvReac Severe  Verified 11/21/17 07:04














- Medications


Medications: 


 Current Medications





Amlodipine Besylate (Norvasc)  5 mg PO HS [facility]


   Last Admin: 05/15/18 21:35 Dose:  5 mg


Aspirin (Ecotrin)  81 mg PO DAILY [facility]


Cyclosporine (Cyclosporine)  50 mg PO HS [facility]


   Last Admin: 05/15/18 21:35 Dose:  50 mg


Cyclosporine (Cyclosporine)  75 mg PO QAM [facility]


Heparin Sodium (Porcine) (Heparin)  5,000 units SC Q8 [facility]


   Last Admin: 05/16/18 05:45 Dose:  5,000 units


Sodium Chloride (Sodium Chloride 0.9%)  1,000 mls @ 100 mls/hr IV .Q10H [facility]


   Last Admin: 05/16/18 02:00 Dose:  100 mls/hr


Metoprolol Succinate (Toprol Xl)  50 mg PO BID [facility]


   Last Admin: 05/15/18 18:27 Dose:  50 mg


Mycophenolate Mofetil (Cellcept)  500 mg PO BID [facility]


   Last Admin: 05/15/18 18:27 Dose:  500 mg


Omega-3-Acid Ethyl Esters (Lovaza)  1 gm PO BID [facility]


   Last Admin: 05/15/18 18:27 Dose:  1 gm


Prednisone (Prednisone Tab)  5 mg PO DAILY [facility]


   Last Admin: 05/15/18 12:28 Dose:  5 mg


Tramadol HCl (Ultram)  50 mg PO BID [facility]


   PRN Reason: Pain, moderate (4-7)


   Last Admin: 05/16/18 00:07 Dose:  50 mg


Trimethoprim/Sulfamethoxazole (Bactrim Ds Tab)  1 tab PO DAILY [facility]


   PRN Reason: Protocol











Results





- Vital Signs


Recent Vital Signs: 


 Last Vital Signs











Temp  97.9 F   05/15/18 23:00


 


Pulse  56 L  05/15/18 23:00


 


Resp  20   05/15/18 23:00


 


BP  136/66   05/15/18 23:00


 


Pulse Ox  96   05/15/18 23:00














- Labs


Result Diagrams: 


 05/15/18 08:09





 05/15/18 08:48


Labs: 


 Laboratory Results - last 24 hr











  05/15/18 05/15/18 05/15/18





  07:53 08:09 08:09


 


WBC   6.8 


 


RBC   4.24 


 


Hgb   11.1 


 


Hct   33.4 L 


 


MCV   78.9 L D 


 


MCH   26.3 L 


 


MCHC   33.3 


 


RDW   16.2 H 


 


Plt Count   199 


 


MPV   7.6 


 


Neut % (Auto)   63.8 


 


Lymph % (Auto)   25.5 


 


Mono % (Auto)   9.2 


 


Eos % (Auto)   0.8 


 


Baso % (Auto)   0.7 


 


Neut # (Auto)   4.4 


 


Lymph # (Auto)   1.7 


 


Mono # (Auto)   0.6 


 


Eos # (Auto)   0.1 


 


Baso # (Auto)   0.0 


 


ESR   


 


Sodium   


 


Potassium   


 


Chloride   


 


Carbon Dioxide   


 


Anion Gap   


 


BUN   


 


Creatinine   


 


Est GFR ( Amer)   


 


Est GFR (Non-Af Amer)   


 


POC Glucose (mg/dL)  94  


 


Random Glucose   


 


Calcium   


 


Phosphorus   


 


Magnesium   


 


Total Bilirubin   


 


AST   


 


ALT   


 


Alkaline Phosphatase   


 


Total Creatine Kinase   


 


CK-MB (Mass)   


 


Troponin I   


 


C-Reactive Protein   


 


Total Protein   


 


Albumin   


 


Globulin   


 


Albumin/Globulin Ratio   


 


Free T4   


 


TSH 3rd Generation   


 


Prolactin   


 


Urine Color    Yellow


 


Urine Clarity    Clear


 


Urine pH    5.0


 


Ur Specific Gravity    1.013


 


Urine Protein    1+ H


 


Urine Glucose (UA)    Normal


 


Urine Ketones    Negative


 


Urine Blood    Negative


 


Urine Nitrate    Negative


 


Urine Bilirubin    Negative


 


Urine Urobilinogen    Normal


 


Ur Leukocyte Esterase    Trace


 


Urine RBC (Auto)    < 1


 


HIV 1&2 Antibody Screen   














  05/15/18 05/15/18 05/15/18





  08:48 12:13 12:13


 


WBC   


 


RBC   


 


Hgb   


 


Hct   


 


MCV   


 


MCH   


 


MCHC   


 


RDW   


 


Plt Count   


 


MPV   


 


Neut % (Auto)   


 


Lymph % (Auto)   


 


Mono % (Auto)   


 


Eos % (Auto)   


 


Baso % (Auto)   


 


Neut # (Auto)   


 


Lymph # (Auto)   


 


Mono # (Auto)   


 


Eos # (Auto)   


 


Baso # (Auto)   


 


ESR   


 


Sodium  144  


 


Potassium  4.0  


 


Chloride  107  


 


Carbon Dioxide  25  


 


Anion Gap  16  


 


BUN  34 H  


 


Creatinine  1.5 H  


 


Est GFR ( Amer)  41  


 


Est GFR (Non-Af Amer)  34  


 


POC Glucose (mg/dL)   


 


Random Glucose  97  


 


Calcium  9.2  


 


Phosphorus    3.9


 


Magnesium    2.1


 


Total Bilirubin  0.4  


 


AST  25  


 


ALT  26  


 


Alkaline Phosphatase  58  


 


Total Creatine Kinase  131  


 


CK-MB (Mass)  2.32  


 


Troponin I  0.0250  


 


C-Reactive Protein    18.10 H


 


Total Protein  6.4  


 


Albumin  3.6  


 


Globulin  2.8  


 


Albumin/Globulin Ratio  1.3  


 


Free T4   


 


TSH 3rd Generation    3.51


 


Prolactin    21.5 H


 


Urine Color   


 


Urine Clarity   


 


Urine pH   


 


Ur Specific Gravity   


 


Urine Protein   


 


Urine Glucose (UA)   


 


Urine Ketones   


 


Urine Blood   


 


Urine Nitrate   


 


Urine Bilirubin   


 


Urine Urobilinogen   


 


Ur Leukocyte Esterase   


 


Urine RBC (Auto)   


 


HIV 1&2 Antibody Screen   Negative 














  05/15/18 05/15/18





  12:13 12:13


 


WBC  


 


RBC  


 


Hgb  


 


Hct  


 


MCV  


 


MCH  


 


MCHC  


 


RDW  


 


Plt Count  


 


MPV  


 


Neut % (Auto)  


 


Lymph % (Auto)  


 


Mono % (Auto)  


 


Eos % (Auto)  


 


Baso % (Auto)  


 


Neut # (Auto)  


 


Lymph # (Auto)  


 


Mono # (Auto)  


 


Eos # (Auto)  


 


Baso # (Auto)  


 


ESR  24 H 


 


Sodium  


 


Potassium  


 


Chloride  


 


Carbon Dioxide  


 


Anion Gap  


 


BUN  


 


Creatinine  


 


Est GFR ( Amer)  


 


Est GFR (Non-Af Amer)  


 


POC Glucose (mg/dL)  


 


Random Glucose  


 


Calcium  


 


Phosphorus  


 


Magnesium  


 


Total Bilirubin  


 


AST  


 


ALT  


 


Alkaline Phosphatase  


 


Total Creatine Kinase  


 


CK-MB (Mass)  


 


Troponin I  


 


C-Reactive Protein  


 


Total Protein  


 


Albumin  


 


Globulin  


 


Albumin/Globulin Ratio  


 


Free T4   1.39


 


TSH 3rd Generation  


 


Prolactin  


 


Urine Color  


 


Urine Clarity  


 


Urine pH  


 


Ur Specific Gravity  


 


Urine Protein  


 


Urine Glucose (UA)  


 


Urine Ketones  


 


Urine Blood  


 


Urine Nitrate  


 


Urine Bilirubin  


 


Urine Urobilinogen  


 


Ur Leukocyte Esterase  


 


Urine RBC (Auto)  


 


HIV 1&2 Antibody Screen

## 2018-05-16 NOTE — CP.PCM.CON
History of Present Illness





- History of Present Illness


History of Present Illness: 





Patient seen/examined.





full consult to follow 





patient admitted with seizure.





has systolic murmur on examination.





Echocardiogram reviewed.  moderate AS, moderate mitral annular calcification.





neuro work up 





Past Patient History





- Infectious Disease


Hx of Infectious Diseases: None





- Past Medical History & Family History


Past Medical History?: Yes


Past Family History: Reviewed and not pertinent





- Past Social History


Smoking Status: Former Smoker


Chewing Tobacco Use: No


Cigar Use: No


Alcohol: Occasional


Drugs: Denies


Home Situation {Lives}: With Family





- CARDIAC


Hx Hypertension: Yes





- PULMONARY


Hx Respiratory Disorders: No





- NEUROLOGICAL


Hx Neurological Disorder: No





- HEENT


Hx Cataracts: Yes





- RENAL


Hx Chronic Kidney Disease: Yes





- ENDOCRINE/METABOLIC


Hx Endocrine Disorders: No





- MUSCULOSKELETAL/RHEUMATOLOGICAL


Hx Falls: Yes





- GASTROINTESTINAL


Hx Gastrointestinal Disorders: No





- PSYCHIATRIC


Hx Substance Use: No





- SURGICAL HISTORY


Hx Surgeries: Yes


Other/Comment: Rt. kidney transplant Aug. 3,2003.  L [facility]





- ANESTHESIA


Hx Anesthesia: Yes


Hx Anesthesia Reactions: No





Meds


Allergies/Adverse Reactions: 


 Allergies











Allergy/AdvReac Type Severity Reaction Status Date / Time


 


amoxicillin AdvReac Severe  Verified 11/21/17 07:04














- Medications


Medications: 


 Current Medications





Amlodipine Besylate (Norvasc)  5 mg PO HS [facility]


   Last Admin: 05/15/18 21:35 Dose:  5 mg


Aspirin (Ecotrin)  81 mg PO DAILY [facility]


   Last Admin: 05/16/18 10:16 Dose:  81 mg


Cyclosporine (Cyclosporine)  50 mg PO HS [facility]


   Last Admin: 05/15/18 21:35 Dose:  50 mg


Cyclosporine (Cyclosporine)  75 mg PO QAM [facility]


   Last Admin: 05/16/18 10:17 Dose:  75 mg


Heparin Sodium (Porcine) (Heparin)  5,000 units SC Q8 [facility]


   Last Admin: 05/16/18 13:28 Dose:  5,000 units


Sodium Chloride (Sodium Chloride 0.9%)  1,000 mls @ 70 mls/hr IV .[id number] [facility]


   Last Admin: 05/16/18 16:08 Dose:  Not Given


Metoprolol Succinate (Toprol Xl)  50 mg PO BID [facility]


   Last Admin: 05/16/18 18:07 Dose:  50 mg


Mycophenolate Mofetil (Cellcept)  500 mg PO BID [facility]


   Last Admin: 05/16/18 18:07 Dose:  500 mg


Omega-3-Acid Ethyl Esters (Lovaza)  1 gm PO BID [facility]


   Last Admin: 05/16/18 18:06 Dose:  1 gm


Prednisone (Prednisone Tab)  5 mg PO DAILY [facility]


   Last Admin: 05/16/18 10:15 Dose:  5 mg


Rosuvastatin Calcium (Crestor)  5 mg PO HS [facility]


Tramadol HCl (Ultram)  50 mg PO BID [facility]


   PRN Reason: Pain, moderate (4-7)


   Last Admin: 05/16/18 10:16 Dose:  50 mg


Trimethoprim/Sulfamethoxazole (Bactrim Ds Tab)  1 tab PO DAILY [facility]


   PRN Reason: Protocol


   Last Admin: 05/16/18 10:15 Dose:  1 tab











Results





- Vital Signs


Recent Vital Signs: 


 Last Vital Signs











Temp  98.6 F   05/16/18 15:00


 


Pulse  53 L  05/16/18 16:00


 


Resp  20   05/16/18 15:00


 


BP  128/62   05/16/18 15:00


 


Pulse Ox  96   05/16/18 15:00














- Labs


Result Diagrams: 


 05/16/18 07:06





 05/16/18 07:06


Labs: 


 Laboratory Results - last 24 hr











  05/15/18 05/15/18 05/16/18





  12:13 12:29 07:06


 


WBC   


 


RBC   


 


Hgb   


 


Hct   


 


MCV   


 


MCH   


 


MCHC   


 


RDW   


 


Plt Count   


 


MPV   


 


Neut % (Auto)   


 


Lymph % (Auto)   


 


Mono % (Auto)   


 


Eos % (Auto)   


 


Baso % (Auto)   


 


Neut # (Auto)   


 


Lymph # (Auto)   


 


Mono # (Auto)   


 


Eos # (Auto)   


 


Baso # (Auto)   


 


Sodium    143


 


Potassium    3.8


 


Chloride    109 H


 


Carbon Dioxide    24


 


Anion Gap    14


 


BUN    29 H


 


Creatinine    1.3 H


 


Est GFR ( Amer)    48


 


Est GFR (Non-Af Amer)    40


 


Random Glucose    95


 


Hemoglobin A1c   


 


Calcium    9.2


 


Ionized Calcium   4.6 L 


 


Phosphorus    3.6


 


Magnesium    2.0


 


Total Bilirubin    0.5


 


AST    32


 


ALT    25


 


Alkaline Phosphatase    62


 


Total Protein    6.1 L


 


Albumin    3.3 L


 


Globulin    2.8


 


Albumin/Globulin Ratio    1.2


 


Triglycerides    220 H D


 


Cholesterol    224 H


 


LDL Cholesterol Direct    119


 


HDL Cholesterol    42


 


Prolactin    11.5


 


EBV Capsid Ag IgG Ab  391.00 H  


 


EBV Capsid Ag IgM Ab  <36.00  


 


EBV Nuclear Antigen Ab  <18.00  


 


EBV Interpretation  See note  














  05/16/18 05/16/18





  07:06 07:06


 


WBC  7.2 


 


RBC  4.11 


 


Hgb  10.8 L 


 


Hct  32.0 L 


 


MCV  77.9 L 


 


MCH  26.2 L 


 


MCHC  33.6 


 


RDW  16.4 H 


 


Plt Count  172 


 


MPV  7.3 


 


Neut % (Auto)  66.8 


 


Lymph % (Auto)  21.8 


 


Mono % (Auto)  10.1 H 


 


Eos % (Auto)  0.9 


 


Baso % (Auto)  0.4 


 


Neut # (Auto)  4.8 


 


Lymph # (Auto)  1.6 


 


Mono # (Auto)  0.7 


 


Eos # (Auto)  0.1 


 


Baso # (Auto)  0.0 


 


Sodium  


 


Potassium  


 


Chloride  


 


Carbon Dioxide  


 


Anion Gap  


 


BUN  


 


Creatinine  


 


Est GFR ( Amer)  


 


Est GFR (Non-Af Amer)  


 


Random Glucose  


 


Hemoglobin A1c   5.4


 


Calcium  


 


Ionized Calcium  


 


Phosphorus  


 


Magnesium  


 


Total Bilirubin  


 


AST  


 


ALT  


 


Alkaline Phosphatase  


 


Total Protein  


 


Albumin  


 


Globulin  


 


Albumin/Globulin Ratio  


 


Triglycerides  


 


Cholesterol  


 


LDL Cholesterol Direct  


 


HDL Cholesterol  


 


Prolactin  


 


EBV Capsid Ag IgG Ab  


 


EBV Capsid Ag IgM Ab  


 


EBV Nuclear Antigen Ab  


 


EBV Interpretation

## 2018-05-16 NOTE — CON
DATE:  05/16/2018



ATTENDING PHYSICIAN:  James Perez MD



LOCATION:  The patient is in room number 661, bed 1.



REASON FOR CONSULTATION:  Seizures.



CHIEF COMPLAINT:  The patient was brought into [facility] with history

of seizures, had been witnessed by her daughter.  From neurological point

of view, I was called in to evaluate her for further management.



HISTORY OF PRESENT ILLNESS:  Ms. Stiven Jackson is a 72-year-old

right-handed morbidly obese female presenting with new onset of seizures. 

She describes that while she was sitting in a chair following coffee,

watching TV, the next thing she realized she had a seizure.  At the time

she woke up, a lot of people were around her.  She will get confused.  As

per the history, she had generalized tonic-clonic activities with a foamy

mouth.  No history of bowel or bladder incontinence or bitten tongue at the

scene.  No similar episodes happened in the past.  However, she admitted 3

months ago she had a fall and bruised her right side of the face.  She

could not recall why she fell on the street on opening the door.



PAST MEDICAL HISTORY:  Significant for hypertension, chronic kidney

disease, history of kidney transplant more than 13 years ago.



REVIEW OF SYSTEMS:  The 12-point system has been reviewed from neuro, new

onset of seizures.



PERSONAL HISTORY:  Denies smoking or alcohol use.



ALLERGIES:  NO KNOWN ALLERGIES.



MEDICATIONS:  Bactrim, CellCept, cyclosporin, Ecotrin, Lovaza, amlodipine.



PHYSICAL EXAMINATION:

VITAL SIGNS:  Blood pressure 136/63, mean artery pressure of 89,

respiratory rate 18, temperature 97.9, with a pulse rate of 56.

NECK:  Supple.  No carotid bruits.

HEART:  Heart sounds, significant ejection systolic murmur grade 3 without

any secondary sounds split.

EXTREMITIES:  1+ pitting edema, right leg externally rotated.

NEUROLOGIC EXAMINATION:

Mental status examination:  Significant retrograde amnesia.  No antegrade

amnesia.  No confabulation.  No hallucination.  No sign of depression.

Speech is clear.  Naming, repetition, fluency, comprehension all within

normal.

Cranial nerve examination:  Visual field intact.  Pupils reactive to light.

Extraocular movement normal.  No nystagmus.  No facial sensory deficit.  No

facial asymmetry.  Hearing is normal.  Tongue is midline.  Good gag.

Motor examination:  Significant weakness of proximal muscle groups in both

upper extremities noted.  She could not be able to lift against gravity in

a sustained fashion.  Mild sensory tremor on outstretched hand with eyes

closed with limited exam.  Same findings noted in both lower extremities,

both _____ are 4/5, distally is 5-/5.

Deep tendon reflexes:  Hyperreflexic on the right side to compare with the

left side.  Both ankles are absent.  Plantars are upgoing on her right

side.

Sensory examination:  Significant small fiber neuropathy, position sense is

intact.

Coordination:  Mild finger-to-nose dysmetria noted, this is probably

secondary to her proximal weakness of the muscle groups.

Gait is deferred at this time.



WORKUP:  CT of the head has been reviewed, no acute pathologies noted. 

Some periventricular ischemic changes noted bilaterally.  EKG:  Normal

sinus rhythm.



Blood workup:  WBC 6.8, hemoglobin 11.1, hematocrit 33.4, platelets 199. 

Sodium 144, potassium 4, chloride 107, bicarbonate 25, BUN 34, creatinine

1.5, magnesium 3.9, CRP 18.10.  TSH 3.51.  Urinalysis:  1+ proteinuria. 

HIV antibodies negative.



CONCLUSION:

1.  Ms. Stiven Jackson as per neurological examination, she is presenting

with new onset of seizures with a history of significant fall with no

reason 3 months ago.  These are all probably secondary to intracranial

pathology versus electrolyte imbalance.

2.  The current examination also showed evidence of significant bilateral

distal symmetric sensory motor neuropathy.

3.  Left subcortical dysfunction.

4.  The patient also is suffering from possible hidden sleep-related

breathing disorder, possible obstructive sleep apnea versus central sleep

apnea.



RECOMMENDATIONS:

1.  Heart murmur with left subcortical dysfunction.  Cardiac workup is

needed and Cardiology consult should be recommended.

2.  Carotid Doppler to assess the stenosis.

3.  EEG to rule out any paroxysmal activities.

4.  Resume aspirin with current medication.

5.  Seizure precaution and fall precaution should be maintained.

6.  Physical therapy to improve her gait.

7.  Recommended her to have electrodiagnostic studies as outpatient.

8.  If EEG is negative, the patient should have ambulatory video

electroencephalogram that can be done as outpatient.

9.  Considering new onset of seizures unless we find any reason for her

seizures, I do not want to put her on any antiepileptic drugs regarding her

polypharmacy and antiepileptic drugs could interact with her current

medication.

10.  The patient should have a polysomnogram to rule out central sleep

apnea versus obstructive sleep apnea.  Comorbid risk factors have all been

discussed including her weight reduction.



The patient agreed with all my recommendations.  The patient will be

followed closely while she is in the hospital.





__________________________________________

Agapito Arora MD





DD:  05/16/2018 7:10:14

DT:  05/16/2018 7:15:12

Job # [id number]

## 2018-05-16 NOTE — CP.PCM.PN
Subjective





- Date & Time of Evaluation


Date of Evaluation: 05/16/18


Time of Evaluation: 07:00





- Subjective


Subjective: 





Medicine Progress Note:





Patient was seen and examined at bedside in the AM.  Patient states she is 

feeling much better.  She denies headache, dizziness, lightheadedness, nausea 

or vomiting.  Patient states she has moved out of bed to chair with help.  





Objective





- Vital Signs/Intake and Output


Vital Signs (last 24 hours): 


 











Temp Pulse Resp BP Pulse Ox


 


 98.6 F   64   20   150/77   96 


 


 05/16/18 10:14  05/16/18 10:14  05/16/18 10:14  05/16/18 10:14  05/16/18 10:14








Intake and Output: 


 











 05/16/18 05/16/18





 06:59 18:59


 


Intake Total 840 


 


Balance 840 














- Medications


Medications: 


 Current Medications





Amlodipine Besylate (Norvasc)  5 mg PO HS [facility]


   Last Admin: 05/15/18 21:35 Dose:  5 mg


Aspirin (Ecotrin)  81 mg PO DAILY [facility]


Cyclosporine (Cyclosporine)  50 mg PO HS [facility]


   Last Admin: 05/15/18 21:35 Dose:  50 mg


Cyclosporine (Cyclosporine)  75 mg PO QAM [facility]


Heparin Sodium (Porcine) (Heparin)  5,000 units SC Q8 [facility]


   Last Admin: 05/16/18 05:45 Dose:  5,000 units


Sodium Chloride (Sodium Chloride 0.9%)  1,000 mls @ 100 mls/hr IV .Q10H [facility]


   Last Admin: 05/16/18 02:00 Dose:  100 mls/hr


Metoprolol Succinate (Toprol Xl)  50 mg PO BID [facility]


   Last Admin: 05/15/18 18:27 Dose:  50 mg


Mycophenolate Mofetil (Cellcept)  500 mg PO BID [facility]


   Last Admin: 05/15/18 18:27 Dose:  500 mg


Omega-3-Acid Ethyl Esters (Lovaza)  1 gm PO BID [facility]


   Last Admin: 05/15/18 18:27 Dose:  1 gm


Prednisone (Prednisone Tab)  5 mg PO DAILY [facility]


   Last Admin: 05/15/18 12:28 Dose:  5 mg


Tramadol HCl (Ultram)  50 mg PO BID [facility]


   PRN Reason: Pain, moderate (4-7)


   Last Admin: 05/16/18 00:07 Dose:  50 mg


Trimethoprim/Sulfamethoxazole (Bactrim Ds Tab)  1 tab PO DAILY [facility]


   PRN Reason: Protocol











- Labs


Labs: 


 





 05/16/18 07:06 





 05/16/18 07:06 











- Constitutional


Appears: No Acute Distress





- Head Exam


Head Exam: ATRAUMATIC, NORMAL INSPECTION





- Eye Exam


Eye Exam: EOMI, Normal appearance





- ENT Exam


ENT Exam: Mucous Membranes Moist





- Respiratory Exam


Respiratory Exam: Clear to Ausculation Bilateral, NORMAL BREATHING PATTERN





- Cardiovascular Exam


Cardiovascular Exam: REGULAR RHYTHM, +S1, +S2





- GI/Abdominal Exam


GI & Abdominal Exam: Soft, Normal Bowel Sounds.  absent: Tenderness





- Extremities Exam


Extremities Exam: Normal Inspection, Tenderness (bilateral LE tender to 

palpation )





- Neurological Exam


Neurological Exam: Alert, Awake, Oriented x3





- Psychiatric Exam


Psychiatric exam: Normal Affect, Normal Mood





- Skin


Skin Exam: Normal Color





Assessment and Plan





- Assessment and Plan (Free Text)


Assessment: 





1.) New onset seizure 


- Neurology consult: Dr. Arora --> help appreciated


- Seizure precautions/Neuro Check q4h


- MRI unable to be completed due to spinal stimulator implant 


Imaging: 


- Head CT without contrast: No acute intracranial hemorrhage. Focal 

hypoattenuation measuring 7 millimeter seen within the right parietal 

subcortical white matter on series 4, image 33 suggestive for age-indeterminate 

ischemic change. Correlation with MRI may be helpful clinically indicated. 

Additional stable focal areas of chronic ischemic and microvascular ischemic 

changes as described above. 


- Head CT with contrast: No enhancing masses or collections. No evidence of 

unusual meningeal enhancement. Minor chronic periventricular white matter 

ischemic changes with scattered chronic bilateral basal nuclei lacunar type 

infarcts. Mild generalized volume loss. 


- Lower extremity venous duplex: No evidence of deep or superficial vein 

thrombosis of bilateral lower extremities. Technically difficult/limited exam 

due to patient body habitus, and patient intolerance to compressions. 


- Prolactin 21.5 --> 11.5


- C-reactive Protein: 18.10


- ESR 24


- Mag 2.1; Phos 3.9


- Ionized calcium 4.6


- Medications


* Aspirin 81mg PO QD


* NS at 70cc/hr





2.) History of renal transplant/ESRD


- Nephrology consult: Dr. Crawford --> help appreciated 


- HIV 1&2 antibody screen: negative


- f/u CMV


- EBV IgG ab 391


- Medications 


* Cyclosporine 75mg PO QAM


* Cyclosporine 50mg PO QHS


* Cellcept 500mg PO BID


* Ultram 50mg PO BID PRN


* Bactrim DS 1 tab PO QD (prophylaxis s/p right kidney transplant) 





3.) History of HTN


- Continue home medication


* Norvasc 5mg PO QHS


- TSH 3.51; free T4 1.39


- Cardiology Consult: Dr. Perkins --> help appreciated 


- Carotid Doppler: Negative 


- f/u ECHO 





4.) History of HLD


- Continue home medication


* Lovaza 1gm PO BID


- Lipid Panel: Triglycerides 220; Total Cholesterol 224; ; HDL 42





5.) Prophylaxis


- Heart healthy diet


- Heparin 5000u SC Q8H


- SCDs


- GI prophylaxis not indicated


- Physical Therapy eval and treat





Case discussed with Dr. Chris Gonsalves PGY-1

## 2018-05-16 NOTE — CP.PCM.CON
History of Present Illness





- History of Present Illness


History of Present Illness: 








HPI: 72 year old female with past medical history of HLD, HTN, ESRD secondary 

to HTN and h/o 1 atrophic kidney, status post  donor kidney transplant 

in  who presented to the ED for evaluation of seizure-like activity. 

Patient lives with her daughter, and the daughter was awoken to the patient 

making sounds. Daughter observed the patient in the chair shaking and "in spasm

" with foam coming from her mouth. Daughter states that afterwards patient was 

unresponsive for 5 minutes, at which point she called 9-1-1. Patient states she 

normally sleeps in her chair because she "is afraid to be in bed" since her 

bedroom is on a separate level of their home as the kitchen and bathroom and 

would require her to use stairs. She does not remember what happened and "woke 

up with everyone around me." Both patient and daughter report that this is the 

first occurrence, denies history of seizures. Patient reports dizziness with 

change of position and cough. Patient denies fever/chills, chest pain, 

shortness of breath, abdominal pain, nausea/vomiting/diarrhea/constipation. 


No rejections of transplant.





PMD: Dr. Crawford


-Per patient and family, patient sees nephrologist Dr. Crawford every 3 months

, and "she takes care of everything." 


Past medical history: HTN, HLD, ESRD secondary to HTN, CKD 3


Past surgical history: bilateral cataracts (), laminectomy x2 L2-L3 and L4-

L5 (, ), spinal stimulator placement ()


Medications: Metoprolol 50mg PO BID, Norvasc 5mg PO QHS, Cellcept 500mg PO BID, 

Cyclosporine 75mg PO QAM and 50mg PO QHS, Prednisone 5mg PO QD, Bactrim DS 1 

tab PO QD, Lovaza 1000mg 2 tabs PO QD, Tramadol 50mg PO BID


Allergies: amoxicillin (swelling, dyspnea)


Family history: son  at age 45, history of Wiskott-Darryn syndrome


Social history: light smoker 40+ years ago; rare alcohol drinker; denies drug 

use; lives with daughter











Review of Systems





- Constitutional


Constitutional: Fatigue, Lethargy





- EENT


Eyes: absent: As Per HPI, Blind Spots, Blurred Vision, Change in Vision, 

Decreased Night Vision, Diplopia, Discharge, Dry Eye, Exophthalmos, Floaters, 

Irritation, Itchy Eyes, Loss of Peripheral Vision, Pain, Photophobia, Requires 

Corrective Lenses, Sees Flashes, Spots in Vision, Tunnel Vision, Other Visual 

Disturbances, Loss of Vision, Other


Ears: absent: As Per HPI, Decreased Hearing, Ear Discharge, Ear Pain, Tinnitus, 

Abnormal Hearing, Disequilibrium, Dizziness, Other


Nose/Mouth/Throat: absent: As Per HPI, Epistaxis, Nasal Congestion, Nasal 

Discharge, Nasal Obstruction, Nasal Trauma, Nose Pain, Post Nasal Drip, Sinus 

Pain, Sinus Pressure, Bleeding Gums, Change in Voice, Dental Pain, Dry Mouth, 

Dysphagia, Halitosis, Hoarsness, Lip Swelling, Mouth Lesions, Mouth Pain, 

Odynophagia, Sore Throat, Throat Swelling, Tongue Swelling, Facial Pain, Neck 

Pain, Neck Mass, Other





- Cardiovascular


Cardiovascular: absent: As Per HPI, Acrocyanosis, Chest Pain, Chest Pain at Rest

, Chest Pain with Activity, Claudication, Diaphoresis, Dyspnea, Dyspnea on 

Exertion, Edema, Irregular Heart Rhythm, Pain Radiating to Arm/Neck/Jaw, Leg 

Edema, Leg Ulcers, Lightheadedness, Orthopnea, Palpitations, Paroxysmal 

Nocturnal Dyspnea, Pedal Edema, Radiating Pain, Rapid Heart Rate, Slow Heart 

Rate, Syncope, Other





- Respiratory


Respiratory: absent: As Per HPI, Cough, Dyspnea, Hemoptysis, Dyspnea on Exertion

, Wheezing, Snoring, Stridor, Pain on Inspiration, Chest Congestion, Excessive 

Mucous Production, Change in Mucous Color, Pain with Coughing, Other





- Gastrointestinal


Gastrointestinal: absent: As Per HPI, Abdominal Pain, Belching, Bloating, 

Change in Bowel Habits, Change in Stool Character, Coffee Ground Emesis, 

Constipation, Cramping, Diarrhea, Dyspepsia, Dysphagia, Early Satiety, 

Excessive Flatus, Fecal Incontinence, Heartburn, Hematemesis, Hematochezia, 

Loose Stools, Melena, Nausea, Odynophagia, Temesmus, Vomiting, Other





- Genitourinary


Genitourinary: As Per HPI





- Musculoskeletal


Musculoskeletal: Muscle Cramps, Muscle Weakness, Myalgias





- Neurological


Neurological: As Per HPI, Radicular Pain, Weakness





Past Patient History





- Infectious Disease


Hx of Infectious Diseases: None





- Past Medical History & Family History


Past Medical History?: Yes


Past Family History: Reviewed and not pertinent





- Past Social History


Smoking Status: Former Smoker


Chewing Tobacco Use: No


Cigar Use: No


Alcohol: Occasional


Drugs: Denies


Home Situation {Lives}: With Family





- CARDIAC


Hx Hypertension: Yes





- PULMONARY


Hx Respiratory Disorders: No





- NEUROLOGICAL


Hx Neurological Disorder: No





- HEENT


Hx Cataracts: Yes





- RENAL


Hx Chronic Kidney Disease: Yes





- ENDOCRINE/METABOLIC


Hx Endocrine Disorders: No





- MUSCULOSKELETAL/RHEUMATOLOGICAL


Hx Falls: Yes





- GASTROINTESTINAL


Hx Gastrointestinal Disorders: No





- PSYCHIATRIC


Hx Substance Use: No





- SURGICAL HISTORY


Hx Surgeries: Yes


Other/Comment: Rt. kidney transplant Aug. 3,2003.  L AV[facility]





- ANESTHESIA


Hx Anesthesia: Yes


Hx Anesthesia Reactions: No





Meds


Allergies/Adverse Reactions: 


 Allergies











Allergy/AdvReac Type Severity Reaction Status Date / Time


 


amoxicillin AdvReac Severe  Verified 17 07:04














- Medications


Medications: 


 Current Medications





Amlodipine Besylate (Norvasc)  [facility]


   Last Admin: 05/15/18 21:35 Dose:  5 mg


Aspirin (Ecotrin)  81 mg PO DAILY [facility]


   Last Admin: 18 10:16 Dose:  81 mg


Cyclosporine (Cyclosporine)  50 mg PO HS [facility]


   Last Admin: 05/15/18 21:35 Dose:  50 mg


Cyclosporine (Cyclosporine)  75 mg PO QAM [facility]


   Last Admin: 18 10:17 Dose:  75 mg


Heparin Sodium (Porcine) (Heparin)  5,000 units SC Q8 [facility]


   Last Admin: 18 13:28 Dose:  5,000 units


Sodium Chloride (Sodium Chloride 0.9%)  1,000 mls @ 100 mls/hr IV .Q10H [facility]


   Last Admin: 18 08:00 Dose:  Not Given


Metoprolol Succinate (Toprol Xl)  50 mg PO BID [facility]


   Last Admin: 18 10:15 Dose:  50 mg


Mycophenolate Mofetil (Cellcept)  500 mg PO BID [facility]


   Last Admin: 18 10:17 Dose:  500 mg


Omega-3-Acid Ethyl Esters (Lovaza)  1 gm PO BID [facility]


   Last Admin: 18 10:15 Dose:  1 gm


Prednisone (Prednisone Tab)  5 mg PO DAILY [facility]


   Last Admin: 18 10:15 Dose:  5 mg


Tramadol HCl (Ultram)  50 mg PO BID [facility]


   PRN Reason: Pain, moderate (4-7)


   Last Admin: 18 10:16 Dose:  50 mg


Trimethoprim/Sulfamethoxazole (Bactrim Ds Tab)  1 tab PO DAILY [facility]


   PRN Reason: Protocol


   Last Admin: 18 10:15 Dose:  1 tab











Physical Exam





- Constitutional


Appears: No Acute Distress, Chronically Ill





- Head Exam


Head Exam: ATRAUMATIC, NORMAL INSPECTION





- Eye Exam


Eye Exam: EOMI, Normal appearance





- Neck Exam


Neck exam: Positive for: Normal Inspection.  Negative for: Tenderness





- Respiratory Exam


Respiratory Exam: Clear to Auscultation Bilateral, NORMAL BREATHING PATTERN





- Cardiovascular Exam


Cardiovascular Exam: REGULAR RHYTHM, +S1





- GI/Abdominal Exam


GI & Abdominal Exam: Soft.  absent: Tenderness





- Extremities Exam


Extremities exam: Positive for: normal inspection.  Negative for: tenderness





- Neurological Exam


Neurological exam: Alert, CN II-XII Intact





- Skin


Skin Exam: Dry, Warm





Results





- Vital Signs


Recent Vital Signs: 


 Last Vital Signs











Temp  98.6 F   18 10:14


 


Pulse  57 L  18 12:30


 


Resp  20   18 10:14


 


BP  150/77   18 10:14


 


Pulse Ox  96   18 12:13














- Labs


Result Diagrams: 


 18 07:06





 18 07:06


Labs: 


 Laboratory Results - last 24 hr











  05/15/18 05/15/18 05/16/18





  12:13 12:29 07:06


 


WBC   


 


RBC   


 


Hgb   


 


Hct   


 


MCV   


 


MCH   


 


MCHC   


 


RDW   


 


Plt Count   


 


MPV   


 


Neut % (Auto)   


 


Lymph % (Auto)   


 


Mono % (Auto)   


 


Eos % (Auto)   


 


Baso % (Auto)   


 


Neut # (Auto)   


 


Lymph # (Auto)   


 


Mono # (Auto)   


 


Eos # (Auto)   


 


Baso # (Auto)   


 


Sodium    143


 


Potassium    3.8


 


Chloride    109 H


 


Carbon Dioxide    24


 


Anion Gap    14


 


BUN    29 H


 


Creatinine    1.3 H


 


Est GFR ( Amer)    48


 


Est GFR (Non-Af Amer)    40


 


Random Glucose    95


 


Hemoglobin A1c   


 


Calcium    9.2


 


Ionized Calcium   4.6 L 


 


Phosphorus    3.6


 


Magnesium    2.0


 


Total Bilirubin    0.5


 


AST    32


 


ALT    25


 


Alkaline Phosphatase    62


 


Total Protein    6.1 L


 


Albumin    3.3 L


 


Globulin    2.8


 


Albumin/Globulin Ratio    1.2


 


Triglycerides    220 H D


 


Cholesterol    224 H


 


LDL Cholesterol Direct    119


 


HDL Cholesterol    42


 


Prolactin    11.5


 


EBV Capsid Ag IgG Ab  391.00 H  


 


EBV Capsid Ag IgM Ab  <36.00  


 


EBV Nuclear Antigen Ab  <18.00  


 


EBV Interpretation  See note  














  18





  07:06 07:06


 


WBC  7.2 


 


RBC  4.11 


 


Hgb  10.8 L 


 


Hct  32.0 L 


 


MCV  77.9 L 


 


MCH  26.2 L 


 


MCHC  33.6 


 


RDW  16.4 H 


 


Plt Count  172 


 


MPV  7.3 


 


Neut % (Auto)  66.8 


 


Lymph % (Auto)  21.8 


 


Mono % (Auto)  10.1 H 


 


Eos % (Auto)  0.9 


 


Baso % (Auto)  0.4 


 


Neut # (Auto)  4.8 


 


Lymph # (Auto)  1.6 


 


Mono # (Auto)  0.7 


 


Eos # (Auto)  0.1 


 


Baso # (Auto)  0.0 


 


Sodium  


 


Potassium  


 


Chloride  


 


Carbon Dioxide  


 


Anion Gap  


 


BUN  


 


Creatinine  


 


Est GFR ( Amer)  


 


Est GFR (Non-Af Amer)  


 


Random Glucose  


 


Hemoglobin A1c   5.4


 


Calcium  


 


Ionized Calcium  


 


Phosphorus  


 


Magnesium  


 


Total Bilirubin  


 


AST  


 


ALT  


 


Alkaline Phosphatase  


 


Total Protein  


 


Albumin  


 


Globulin  


 


Albumin/Globulin Ratio  


 


Triglycerides  


 


Cholesterol  


 


LDL Cholesterol Direct  


 


HDL Cholesterol  


 


Prolactin  


 


EBV Capsid Ag IgG Ab  


 


EBV Capsid Ag IgM Ab  


 


EBV Nuclear Antigen Ab  


 


EBV Interpretation  














Assessment & Plan


(1) History of kidney transplant


Status: Acute   





(2) Seizure


Status: Acute   





(3) HLD (hyperlipidemia)


Status: Acute   





(4) CKD (chronic kidney disease) stage 3, GFR 30-59 ml/min


Status: Acute   





(5) HTN (hypertension)


Status: Chronic   Priority: Medium   





- Assessment and Plan (Free Text)


Plan: 





neuro workup


check CSA level


repeat chemistries

## 2018-05-16 NOTE — CP.PCM.CON
History of Present Illness





- History of Present Illness


History of Present Illness: 





I was asked to evaluate patient by Dr Perez.





Patient is a 72 year old female with PMH HTN, renal transplant, who presents 

with seizure.  The daughter states she witnessed abnormal jerking motions and 

foaming at the mouth.  She was brought to [facility] for further 

management.  She currently denies chest pain or dyspnea.    





Review of Systems





- Constitutional


Constitutional: absent: As Per HPI, Anorexia, Chills, Daytime Sleepiness, 

Excessive Sweating, Fatigue, Fever, Frequent Falls, Headache, Increased Appetite

, Lethargy, Malaise, Night Sweats, Snoring, Sleep Apnea, Weight Gain, Weight 

Loss, Weakness, Other





- EENT


Eyes: absent: As Per HPI, Blind Spots, Blurred Vision, Change in Vision, 

Decreased Night Vision, Diplopia, Discharge, Dry Eye, Exophthalmos, Floaters, 

Irritation, Itchy Eyes, Loss of Peripheral Vision, Pain, Photophobia, Requires 

Corrective Lenses, Sees Flashes, Spots in Vision, Tunnel Vision, Other Visual 

Disturbances, Loss of Vision, Other


Ears: absent: As Per HPI, Decreased Hearing, Ear Discharge, Ear Pain, Tinnitus, 

Abnormal Hearing, Disequilibrium, Dizziness, Other


Nose/Mouth/Throat: absent: As Per HPI, Epistaxis, Nasal Congestion, Nasal 

Discharge, Nasal Obstruction, Nasal Trauma, Nose Pain, Post Nasal Drip, Sinus 

Pain, Sinus Pressure, Bleeding Gums, Change in Voice, Dental Pain, Dry Mouth, 

Dysphagia, Halitosis, Hoarsness, Lip Swelling, Mouth Lesions, Mouth Pain, 

Odynophagia, Sore Throat, Throat Swelling, Tongue Swelling, Facial Pain, Neck 

Pain, Neck Mass, Other





- Cardiovascular


Cardiovascular: absent: As Per HPI, Acrocyanosis, Chest Pain, Chest Pain at Rest

, Chest Pain with Activity, Claudication, Diaphoresis, Dyspnea, Dyspnea on 

Exertion, Edema, Irregular Heart Rhythm, Pain Radiating to Arm/Neck/Jaw, Leg 

Edema, Leg Ulcers, Lightheadedness, Orthopnea, Palpitations, Paroxysmal 

Nocturnal Dyspnea, Pedal Edema, Radiating Pain, Rapid Heart Rate, Slow Heart 

Rate, Syncope, Other





- Respiratory


Respiratory: absent: As Per HPI, Cough, Dyspnea, Hemoptysis, Dyspnea on Exertion

, Wheezing, Snoring, Stridor, Pain on Inspiration, Chest Congestion, Excessive 

Mucous Production, Change in Mucous Color, Pain with Coughing, Other





- Gastrointestinal


Gastrointestinal: absent: As Per HPI, Abdominal Pain, Belching, Bloating, 

Change in Bowel Habits, Change in Stool Character, Coffee Ground Emesis, 

Constipation, Cramping, Diarrhea, Dyspepsia, Dysphagia, Early Satiety, 

Excessive Flatus, Fecal Incontinence, Heartburn, Hematemesis, Hematochezia, 

Loose Stools, Melena, Nausea, Odynophagia, Temesmus, Vomiting, Other





- Genitourinary


Genitourinary: absent: As Per HPI, Change in Urinary Stream, Difficulty 

Urinating, Dysuria, Flank Pain, Hematuria, Pyuria, Nocturia, Urinary 

Incontinence, Urinary Frequency, Urinary Hesitance, Urinary Urgency, Voiding 

Freq/Small Amts, Freq UTI, Hx Renal/Bladder Calculi, Hx /Renal Surgery, 

Bladder Distension, Other





- Musculoskeletal


Musculoskeletal: absent: As Per HPI, Abnormal Gait, Arthralgias, Atrophy, Back 

Pain, Deformity, Joint Swelling, Limited Range of Motion, Loss of Height, 

Muscle Cramps, Muscle Weakness, Myalgias, Neck Pain, Numbness, Radiating Pain 

into Limb, Stiffness, Tingling, Other





- Integumentary


Integumentary: absent: As Per HPI, Acne, Alopecia, Bleeding Lesions, Change in 

Hair, Change in Nails, Change in Pigmentation, Changing Lesions, Dry Skin, 

Erythema, Furuncle, Hirsutism, Lesions, New Lesions, Non-Healing Lesions, 

Photosensitivity, Pruritus, Rash, Skin Pain, Skin Ulcer, Sores, Striae, Swelling

, Unusual Bruising, Wounds, Jaundice, Other





- Neurological


Neurological: Convulsions





- Psychiatric


Psychiatric: absent: As Per HPI, Abnormal Sleep Pattern, Anhedonia, Anxiety, 

Auditory Hallucinations, Behavioral Changes, Change in Appetite, Change in 

Libido, Confusion, Depression, Difficulty Concentrating, Hallucinations, 

Homicidal Ideation, Hopelessness, Irritability, Memory Loss, Mood Swings, Panic 

Attacks, Paranoia, Suicidal Ideation, Visual Hallucinations, Tactile 

Hallucinations, Other





- Endocrine


Endocrine: absent: As Per HPI, Change in Body Appearance, Change in Libido, 

Cold Intolorance, Deepening of Voice, Excessive Sweating, Fatigue, Flushing, 

Heat Intolorance, Increase in Ring/Shoe/Hat Size, Palpitations, Polydipsia, 

Polyphagia, Polyuria, Other





- Hematologic/Lymphatic


Hematologic: absent: As Per HPI, Easy Bleeding, Easy Bruising, Lymphadenopathy, 

Other





Past Patient History





- Infectious Disease


Hx of Infectious Diseases: None





- Past Medical History & Family History


Past Medical History?: Yes


Past Family History: Reviewed and not pertinent





- Past Social History


Smoking Status: Former Smoker


Chewing Tobacco Use: No


Cigar Use: No


Alcohol: Occasional


Drugs: Denies


Home Situation {Lives}: With Family





- CARDIAC


Hx Hypertension: Yes





- PULMONARY


Hx Respiratory Disorders: No





- NEUROLOGICAL


Hx Neurological Disorder: No





- HEENT


Hx Cataracts: Yes





- RENAL


Hx Chronic Kidney Disease: Yes





- ENDOCRINE/METABOLIC


Hx Endocrine Disorders: No





- MUSCULOSKELETAL/RHEUMATOLOGICAL


Hx Falls: Yes





- GASTROINTESTINAL


Hx Gastrointestinal Disorders: No





- PSYCHIATRIC


Hx Substance Use: No





- SURGICAL HISTORY


Hx Surgeries: Yes


Other/Comment: Rt. kidney transplant Aug. 3,2003.  L AVF





- ANESTHESIA


Hx Anesthesia: Yes


Hx Anesthesia Reactions: No





Meds


Allergies/Adverse Reactions: 


 Allergies











Allergy/AdvReac Type Severity Reaction Status Date / Time


 


amoxicillin AdvReac Severe  Verified 11/21/17 07:04














- Medications


Medications: 


 Current Medications





Amlodipine Besylate (Norvasc)  5 mg PO HS [facility]


   Last Admin: 05/15/18 21:35 Dose:  5 mg


Aspirin (Ecotrin)  81 mg PO DAILY [facility]


   Last Admin: 05/16/18 10:16 Dose:  81 mg


Cyclosporine (Cyclosporine)  50 mg PO HS [facility]


   Last Admin: 05/15/18 21:35 Dose:  50 mg


Cyclosporine (Cyclosporine)  75 mg PO QAM [facility]


   Last Admin: 05/16/18 10:17 Dose:  75 mg


Heparin Sodium (Porcine) (Heparin)  5,000 units SC Q8 [facility]


   Last Admin: 05/16/18 13:28 Dose:  5,000 units


Sodium Chloride (Sodium Chloride 0.9%)  1,000 mls @ 70 mls/hr IV .[id number] [facility]


   Last Admin: 05/16/18 16:08 Dose:  Not Given


Metoprolol Succinate (Toprol Xl)  50 mg PO BID [facility]


   Last Admin: 05/16/18 18:07 Dose:  50 mg


Mycophenolate Mofetil (Cellcept)  500 mg PO BID [facility]


   Last Admin: 05/16/18 18:07 Dose:  500 mg


Omega-3-Acid Ethyl Esters (Lovaza)  1 gm PO BID [facility]


   Last Admin: 05/16/18 18:06 Dose:  1 gm


Prednisone (Prednisone Tab)  5 mg PO DAILY [facility]


   Last Admin: 05/16/18 10:15 Dose:  5 mg


Rosuvastatin Calcium (Crestor)  5 mg PO HS [facility]


Tramadol HCl (Ultram)  50 mg PO BID PRN


   PRN Reason: Pain, moderate (4-7)


   Last Admin: 05/16/18 10:16 Dose:  50 mg


Trimethoprim/Sulfamethoxazole (Bactrim Ds Tab)  1 tab PO DAILY [facility]


   PRN Reason: Protocol


   Last Admin: 05/16/18 10:15 Dose:  1 tab











Physical Exam





- Constitutional


Appears: Non-toxic





- Head Exam


Head Exam: NORMAL INSPECTION





- Eye Exam


Eye Exam: Normal appearance





- ENT Exam


ENT Exam: Mucous Membranes Moist





- Neck Exam


Neck exam: Positive for: Full Rom





- Respiratory Exam


Respiratory Exam: NORMAL BREATHING PATTERN





- Cardiovascular Exam


Cardiovascular Exam: REGULAR RHYTHM, Systolic Murmur





- GI/Abdominal Exam


GI & Abdominal Exam: Normal Bowel Sounds





- Rectal Exam


Rectal Exam: Deferred





- Extremities Exam


Extremities exam: Positive for: pedal pulses present





- Back Exam


Back exam: NORMAL INSPECTION





- Neurological Exam


Neurological exam: Alert, Oriented x3





- Psychiatric Exam


Psychiatric exam: Normal Mood





- Skin


Skin Exam: Dry





Results





- Vital Signs


Recent Vital Signs: 


 Last Vital Signs











Temp  98.6 F   05/16/18 15:00


 


Pulse  53 L  05/16/18 16:00


 


Resp  20   05/16/18 15:00


 


BP  128/62   05/16/18 15:00


 


Pulse Ox  96   05/16/18 15:00














- Labs


Result Diagrams: 


 05/17/18 07:13





 05/17/18 07:13


Labs: 


 Laboratory Results - last 24 hr











  05/15/18 05/15/18 05/16/18





  12:13 12:29 07:06


 


WBC   


 


RBC   


 


Hgb   


 


Hct   


 


MCV   


 


MCH   


 


MCHC   


 


RDW   


 


Plt Count   


 


MPV   


 


Neut % (Auto)   


 


Lymph % (Auto)   


 


Mono % (Auto)   


 


Eos % (Auto)   


 


Baso % (Auto)   


 


Neut # (Auto)   


 


Lymph # (Auto)   


 


Mono # (Auto)   


 


Eos # (Auto)   


 


Baso # (Auto)   


 


Sodium    143


 


Potassium    3.8


 


Chloride    109 H


 


Carbon Dioxide    24


 


Anion Gap    14


 


BUN    29 H


 


Creatinine    1.3 H


 


Est GFR ( Amer)    48


 


Est GFR (Non-Af Amer)    40


 


Random Glucose    95


 


Hemoglobin A1c   


 


Calcium    9.2


 


Ionized Calcium   4.6 L 


 


Phosphorus    3.6


 


Magnesium    2.0


 


Total Bilirubin    0.5


 


AST    32


 


ALT    25


 


Alkaline Phosphatase    62


 


Total Protein    6.1 L


 


Albumin    3.3 L


 


Globulin    2.8


 


Albumin/Globulin Ratio    1.2


 


Triglycerides    220 H D


 


Cholesterol    224 H


 


LDL Cholesterol Direct    119


 


HDL Cholesterol    42


 


Prolactin    11.5


 


EBV Capsid Ag IgG Ab  391.00 H  


 


EBV Capsid Ag IgM Ab  <36.00  


 


EBV Nuclear Antigen Ab  <18.00  


 


EBV Interpretation  See note  














  05/16/18 05/16/18





  07:06 07:06


 


WBC  7.2 


 


RBC  4.11 


 


Hgb  10.8 L 


 


Hct  32.0 L 


 


MCV  77.9 L 


 


MCH  26.2 L 


 


MCHC  33.6 


 


RDW  16.4 H 


 


Plt Count  172 


 


MPV  7.3 


 


Neut % (Auto)  66.8 


 


Lymph % (Auto)  21.8 


 


Mono % (Auto)  10.1 H 


 


Eos % (Auto)  0.9 


 


Baso % (Auto)  0.4 


 


Neut # (Auto)  4.8 


 


Lymph # (Auto)  1.6 


 


Mono # (Auto)  0.7 


 


Eos # (Auto)  0.1 


 


Baso # (Auto)  0.0 


 


Sodium  


 


Potassium  


 


Chloride  


 


Carbon Dioxide  


 


Anion Gap  


 


BUN  


 


Creatinine  


 


Est GFR ( Amer)  


 


Est GFR (Non-Af Amer)  


 


Random Glucose  


 


Hemoglobin A1c   5.4


 


Calcium  


 


Ionized Calcium  


 


Phosphorus  


 


Magnesium  


 


Total Bilirubin  


 


AST  


 


ALT  


 


Alkaline Phosphatase  


 


Total Protein  


 


Albumin  


 


Globulin  


 


Albumin/Globulin Ratio  


 


Triglycerides  


 


Cholesterol  


 


LDL Cholesterol Direct  


 


HDL Cholesterol  


 


Prolactin  


 


EBV Capsid Ag IgG Ab  


 


EBV Capsid Ag IgM Ab  


 


EBV Nuclear Antigen Ab  


 


EBV Interpretation  














- EKG Data


EKG Interpreted by: Myself





Assessment & Plan


(1) Aortic stenosis


Assessment and Plan: 


I reviewed the echocardiogram.  The patient has moderate AS, and moderate MR.  

It is unlikely that this contributed to the patient's symptoms


Status: Acute   





(2) HLD (hyperlipidemia)


Assessment and Plan: 


statin therapy


Status: Acute   





(3) Near syncope


Assessment and Plan: 


medical therapy


Status: Acute   





(4) HTN (hypertension)


Assessment and Plan: 


blood pressure control


Status: Chronic   Priority: Medium   





(5) Hyperlipidemia


Status: Chronic   Priority: Medium

## 2018-05-16 NOTE — VASCLAB
PROCEDURE:  



HISTORY:

Seizures



COMPARISON:

Carotid ultrasound dated 10/05/2017. 



TECHNIQUE:

Grayscale and duplex Doppler evaluation of the cervical carotid and 

vertebral arteries were performed. The common carotid, carotid 

bifurcations and cervical Internal Carotid Artery (ICA) and proximal 

External Carotid Artery (ECA) were evaluated.  The vertebral arteries 

were evaluated for gross patency and flow direction. 



Report prepared by  LISA Mak



FINDINGS:



RIGHT CAROTID ARTERIES:

1. Common Carotid Artery: No significant focal plaque formation of 

the right common carotid artery. Maximum Peak Systolic velocity: 84 

cm/sec: End-diastolic velocity 15 cm/sec.



2. Carotid Bifurcation:  plaque formation. Maximum Peak Systolic 

velocity: 76 cm/sec: End-diastolic velocity 9 cm/sec.  



3. Internal Carotid Artery:    Plaque description:   



3.1. Proximal Segment: Peak systolic velocity 103 cm/sec: 

End-diastolic velocity  35 cm/sec - % stenosis  0-15%



3.2. Middle Segment:    Peak systolic velocity 68 cm/sec: 

End-diastolic velocity  15  cm/sec - % stenosis 0-15%



3.3. Distal Segment:     Peak systolic velocity 57 cm/sec: 

End-diastolic velocity  15  cm/sec - % stenosis 0-15%



4. External Carotid Artery: No significant focal plaque formation. 

Peak systolic velocity 97 cm/sec



5. ICA/CCA Ratio: 1.2



LEFT CAROTID ARTERIES:

1. Common Carotid Artery: No significant focal plaque formation of 

the left common carotid artery. Maximum Peak Systolic velocity: 81 

cm/sec: End-diastolic velocity 9 cm/sec.



2. Carotid Bifurcation:  plaque formation. Maximum Peak Systolic 

velocity: 61 cm/sec: End-diastolic velocity 9 cm/sec.



3. Internal Carotid Artery:      Plaque description:  



3.1. Proximal Segment: Peak systolic velocity 74 cm/sec: 

End-diastolic velocity 19 cm/sec - % stenosis 0-15%



3.2. Middle Segment:    Peak systolic velocity 95 cm/sec: 

End-diastolic velocity 30 cm/sec - % stenosis 0-15%



3.3. Distal Segment:     Peak systolic velocity 58 cm/sec: 

End-diastolic velocity 13 cm/sec - % stenosis 0-15%



4. External Carotid Artery: No significant focal plaque formation. 

Peak systolic velocity 92 cm/sec



5. ICA/CCA Ratio: 1.2



VERTEBRAL ARTERIES:

1. Right Vertebral Artery: The right vertebral artery flow direction 

is  antegrade.



2. Left Vertebral Artery:   The left vertebral artery flow direction 

is antegrade.



OTHER FINDINGS:

1. Right Brachial Blood pressure:  mmHg.



2. Left Brachial Blood pressure:  mmHg.



IMPRESSION:

RIGHT:  Duplex scan does not suggest hemodynamically significant 

stenosis of the right extracranial carotid arteries.  



LEFT:  Duplex scan does not suggest hemodynamically significant 

stenosis of the left extracranial carotid arteries.

## 2018-05-17 NOTE — CP.PCM.DIS
Provider





- Provider


Date of Admission: 


05/15/18 09:46





Attending physician: 


James Perez Jr, MD





Time Spent in preparation of Discharge (in minutes): 40





Hospital Course





- Lab Results


Lab Results: 


 Most Recent Lab Values











WBC  7.6 K/uL (4.8-10.8)   18  07:13    


 


RBC  4.26 Mil/uL (3.80-5.20)   18  07:13    


 


Hgb  11.2 g/dL (11.0-16.0)   18  07:13    


 


Hct  33.0 % (34.0-47.0)  L  18  07:13    


 


MCV  77.5 fL (81.0-99.0)  L  18  07:13    


 


MCH  26.2 pg (27.0-31.0)  L  18  07:13    


 


MCHC  33.8 g/dL (33.0-37.0)   18  07:13    


 


RDW  16.2 % (11.5-14.5)  H  18  07:13    


 


Plt Count  171 K/uL (130-400)   18  07:13    


 


MPV  7.3 fL (7.2-11.7)   18  07:13    


 


Neut % (Auto)  64.3 % (50.0-75.0)   18  07:13    


 


Lymph % (Auto)  23.1 % (20.0-40.0)   18  07:13    


 


Mono % (Auto)  11.0 % (0.0-10.0)  H  18  07:13    


 


Eos % (Auto)  1.2 % (0.0-4.0)   18  07:13    


 


Baso % (Auto)  0.4 % (0.0-2.0)   18  07:13    


 


Neut # (Auto)  4.9 K/uL (1.8-7.0)   18  07:13    


 


Lymph # (Auto)  1.8 K/uL (1.0-4.3)   18  07:13    


 


Mono # (Auto)  0.8 K/uL (0.0-0.8)   18  07:13    


 


Eos # (Auto)  0.1 K/uL (0.0-0.7)   18  07:13    


 


Baso # (Auto)  0.0 K/uL (0.0-0.2)   18  07:13    


 


ESR  24 mm/hr (0-20)  H  05/15/18  12:13    


 


Sodium  142 mmol/L (132-148)   18  07:13    


 


Potassium  4.0 mmol/L (3.6-5.2)   18  07:13    


 


Chloride  105 mmol/L ()   18  07:13    


 


Carbon Dioxide  27 mmol/L (22-30)   18  07:13    


 


Anion Gap  14  (10-20)   18  07:13    


 


BUN  22 mg/dL (7-17)  H  18  07:13    


 


Creatinine  1.4 mg/dL (0.7-1.2)  H  18  07:13    


 


Est GFR ( Amer)  44   18  07:13    


 


Est GFR (Non-Af Amer)  37   18  07:13    


 


POC Glucose (mg/dL)  94 mg/dL ()   05/15/18  07:53    


 


Random Glucose  89 mg/dL ()   18  07:13    


 


Hemoglobin A1c  5.4 % (4.2-6.5)   18  07:06    


 


Calcium  9.0 mg/dl (8.6-10.4)   18  07:13    


 


Ionized Calcium  4.6 mg/dL (4.80-5.60)  L  05/15/18  12:29    


 


Phosphorus  3.4 mg/dL (2.5-4.5)   18  07:13    


 


Magnesium  2.0 mg/dL (1.6-2.3)   18  07:13    


 


Total Bilirubin  0.6 mg/dL (0.2-1.3)   18  07:13    


 


AST  40 U/L (14-36)  H D 18  07:13    


 


ALT  18 U/L (9-52)   18  07:13    


 


Alkaline Phosphatase  68 U/L ()   18  07:13    


 


Total Creatine Kinase  131 U/L ()   05/15/18  08:48    


 


CK-MB (Mass)  2.32 ng/mL (0.0-3.38)   05/15/18  08:48    


 


Troponin I  0.0250 ng/mL (0.00-0.120)   05/15/18  08:48    


 


C-Reactive Protein  18.10 mg/L (0.0-9.9)  H  05/15/18  12:13    


 


Total Protein  6.3 g/dL (6.3-8.3)   18  07:13    


 


Albumin  3.6 g/dL (3.5-5.0)   18  07:13    


 


Globulin  2.7 gm/dL (2.2-3.9)   18  07:13    


 


Albumin/Globulin Ratio  1.3  (1.0-2.1)   18  07:13    


 


Triglycerides  220 mg/dL (0-149)  H D 18  07:06    


 


Cholesterol  224 mg/dL (0-199)  H  18  07:06    


 


LDL Cholesterol Direct  119 mg/dL (0-129)   18  07:06    


 


HDL Cholesterol  42 mg/dL (30-70)   18  07:06    


 


Free T4  1.39 ng/dL (0.78-2.19)   05/15/18  12:13    


 


TSH 3rd Generation  3.51 mIU/L (0.46-4.68)   05/15/18  12:13    


 


Prolactin  11.5 ng/mL (3.0-18.9)   18  07:06    


 


Urine Color  Yellow  (YELLOW)   05/15/18  08:09    


 


Urine Clarity  Clear  (Clear)   05/15/18  08:09    


 


Urine pH  5.0  (5.0-8.0)   05/15/18  08:09    


 


Ur Specific Gravity  1.013  (1.003-1.030)   05/15/18  08:09    


 


Urine Protein  1+ mg/dL (NEGATIVE)  H  05/15/18  08:09    


 


Urine Glucose (UA)  Normal mg/dL (Normal)   05/15/18  08:09    


 


Urine Ketones  Negative mg/dL (NEGATIVE)   05/15/18  08:09    


 


Urine Blood  Negative  (NEGATIVE)   05/15/18  08:09    


 


Urine Nitrate  Negative  (NEGATIVE)   05/15/18  08:09    


 


Urine Bilirubin  Negative  (NEGATIVE)   05/15/18  08:09    


 


Urine Urobilinogen  Normal mg/dL (0.2-1.0)   05/15/18  08:09    


 


Ur Leukocyte Esterase  Trace Tunde/uL (Negative)   05/15/18  08:09    


 


Urine RBC (Auto)  < 1 /hpf (0-3)   05/15/18  08:09    


 


CMV IgG Ab  8.80 U/mL H  05/15/18  12:13    


 


CMV IgM Ab  <30.00 AU/mL  05/15/18  12:13    


 


EBV Capsid Ag IgG Ab  391.00 U/mL H  05/15/18  12:13    


 


EBV Capsid Ag IgM Ab  <36.00 U/mL  05/15/18  12:13    


 


EBV Nuclear Antigen Ab  <18.00 U/mL  05/15/18  12:13    


 


EBV Interpretation  See note   05/15/18  12:13    


 


HIV 1&2 Antibody Screen  Negative  (NEGATIVE)   05/15/18  12:13    














- Hospital Course


Hospital Course: 


Code status: full code


Advanced directives: denies


Healthcare proxy: daughter Yaya Jackson [phone]





HPI: 72 year old female with past medical history of HLD, HTN, ESRD secondary 

to HTN status post right kidney transplant in  who presented to the ED for 

evaluation of seizure-like activity. Patient lives with her daughter, and the 

daughter was awoken to the patient making sounds. Daughter observed the patient 

in the chair shaking and "in spasm" with foam coming from her mouth. Daughter 

states that afterwards patient was unresponsive for 5 minutes, at which point 

she called 9-1-1. Patient states she normally sleeps in her chair because she 

"is afraid to be in bed" since her bedroom is on a separate level of their home 

as the kitchen and bathroom and would require her to use stairs. She does not 

remember what happened and "woke up with everyone around me." Both patient and 

daughter report that this is the first occurrence, denies history of seizures. 

Patient reports dizziness with change of position and cough. Patient denies 

fever/chills, chest pain, shortness of breath, abdominal pain, nausea/vomiting/

diarrhea/constipation. 





PMD: Dr. Crawford


-Per patient and family, patient sees nephrologist Dr. Crawford every 3 months

, and "she takes care of everything." 


Past medical history: HTN, HLD, ESRD secondary to HTN


Past surgical history: bilateral cataracts (), laminectomy x2 L2-L3 and L4-

L5 (, ), spinal stimulator placement (2016)


Medications: Metoprolol 50mg PO BID, Norvasc 5mg PO QHS, Cellcept 500mg PO BID, 

Cyclosporine 75mg PO QAM and 50mg PO QHS, Prednisone 5mg PO QD, Bactrim DS 1 

tab PO QD, Lovaza 1000mg 2 tabs PO QD, Tramadol 50mg PO BID


Allergies: amoxicillin (swelling, dyspnea)


Family history: son  at age 45, history of [place] syndrome


Social history: light smoker 40+ years ago; rare alcohol drinker; denies drug 

use; lives with daughter





Hospital Course:


During this admission, patient was diagnosed with new onset seizure, history of 

renal transplant/ESRD, history of HTN, history of HLD. During this admission, 

cardiology Dr. Perkins, neurology Dr. Arora, and nephrology Dr. Crawford (

answered by Dr. Lynch) were consulted. Patient has been stable with no further 

acute events with stable lab work. Etiology of initial seizure activity will 

need continued outpatient work-up per neurology Dr. Arora. Electroencephalogram 

was ordered and performed; results were negative per Dr. Arora. Per cardiology 

Dr. Perkins, patient will need outpatient cardiology follow up as well, but 

unlikely that aortic stenosis contributed to symptoms. Renal function has been 

stable with home medications continued per nephrology Dr. Lynch. Viral studies 

were performed due to immunosuppression post kidney transplant, negative HIV 1&

2 antibody screen, EBV IgG antibody 391, CMV IgG antibody 8.8. 








Patient is stable for discharge.


Please start all home medications.


Please make an appointment with neurology Dr. Arora.


[address] [place] [id number]





Please call to make an appointment with caridologist Dr. Perkins:


[address] [facility], [place] [id number]


# [phone]





Please follow up with PMD Dr. Perez.


Please return to the emergency room if symptoms return or worsen.





This is a summary of the patient's hospital course, please refer to full EMR 

for further details. 





Discharge Exam





- Head Exam


Head Exam: NORMAL INSPECTION





- Eye Exam


Eye Exam: EOMI, Normal appearance, PERRL


Pupil Exam: NORMAL ACCOMODATION





- ENT Exam


ENT Exam: Mucous Membranes Moist





- Respiratory Exam


Respiratory Exam: Clear to PA & Lateral, NORMAL BREATHING PATTERN





- Cardiovascular Exam


Cardiovascular Exam: REGULAR RHYTHM, +S1, +S2, Systolic Murmur





- GI/Abdominal Exam


GI & Abdominal Exam: Normal Bowel Sounds, Soft.  absent: Tenderness





- Extremities Exam


Extremities exam: normal inspection





- Neurological Exam


Neurological exam: Alert, Oriented x3





- Psychiatric Exam


Psychiatric exam: Normal Affect, Normal Mood





- Skin


Skin Exam: Normal Color





Discharge Plan





- Follow Up Plan


Condition: GOOD


Disposition: HOME/ ROUTINE

## 2018-05-17 NOTE — CP.PCM.PN
Subjective





- Date & Time of Evaluation


Date of Evaluation: 05/17/18


Time of Evaluation: 10:10





- Subjective


Subjective: 





feels well; no new complaint


no more seizure activity


s/p echo- results noted


renal function stable





Objective





- Vital Signs/Intake and Output


Vital Signs (last 24 hours): 


 











Temp Pulse Resp BP Pulse Ox


 


 98.4 F   62   20   139/54 L  96 


 


 05/17/18 09:02  05/17/18 09:52  05/17/18 09:02  05/17/18 09:52  05/17/18 09:02








Intake and Output: 


 











 05/17/18 05/17/18





 06:59 18:59


 


Intake Total 480 


 


Balance 480 














- Medications


Medications: 


 Current Medications





Amlodipine Besylate (Norvasc)  5 mg PO HS [facility]


   Last Admin: 05/16/18 21:39 Dose:  5 mg


Aspirin (Ecotrin)  81 mg PO DAILY [facility]


   Last Admin: 05/17/18 09:53 Dose:  81 mg


Cyclosporine (Cyclosporine)  50 mg PO HS [facility]


   Last Admin: 05/16/18 21:40 Dose:  50 mg


Cyclosporine (Cyclosporine)  75 mg PO QAM [facility]


   Last Admin: 05/17/18 09:55 Dose:  75 mg


Heparin Sodium (Porcine) (Heparin)  5,000 units SC Q8 [facility]


   Last Admin: 05/17/18 05:49 Dose:  5,000 units


Sodium Chloride (Sodium Chloride 0.9%)  1,000 mls @ 70 mls/hr IV .[id number] [facility]


   Last Admin: 05/16/18 16:08 Dose:  Not Given


Metoprolol Succinate (Toprol Xl)  50 mg PO BID [facility]


   Last Admin: 05/17/18 09:53 Dose:  50 mg


Mycophenolate Mofetil (Cellcept)  500 mg PO BID [facility]


   Last Admin: 05/17/18 09:54 Dose:  500 mg


Omega-3-Acid Ethyl Esters (Lovaza)  1 gm PO BID [facility]


   Last Admin: 05/17/18 09:53 Dose:  1 gm


Prednisone (Prednisone Tab)  5 mg PO DAILY [facility]


   Last Admin: 05/17/18 09:53 Dose:  5 mg


Rosuvastatin Calcium (Crestor)  10 mg PO HS [facility]


Tramadol HCl (Ultram)  50 mg PO BID [facility]


   PRN Reason: Pain, moderate (4-7)


   Last Admin: 05/17/18 01:07 Dose:  50 mg


Trimethoprim/Sulfamethoxazole (Bactrim Ds Tab)  1 tab PO DAILY [facility]


   PRN Reason: Protocol


   Last Admin: 05/17/18 09:53 Dose:  1 tab











- Labs


Labs: 


 





 05/17/18 07:13 





 05/17/18 07:13 











- Constitutional


Appears: No Acute Distress, Chronically Ill





- Head Exam


Head Exam: ATRAUMATIC, NORMAL INSPECTION





- Eye Exam


Eye Exam: EOMI, Normal appearance





- Neck Exam


Neck Exam: Normal Inspection.  absent: Tenderness





- Respiratory Exam


Respiratory Exam: Clear to Ausculation Bilateral, NORMAL BREATHING PATTERN





- Cardiovascular Exam


Cardiovascular Exam: REGULAR RHYTHM, +S1





- GI/Abdominal Exam


GI & Abdominal Exam: Soft.  absent: Tenderness





- Extremities Exam


Extremities Exam: Normal Inspection.  absent: Tenderness





- Neurological Exam


Neurological Exam: Alert, CN II-XII Intact





- Skin


Skin Exam: Dry, Warm





Assessment and Plan


(1) History of kidney transplant


Status: Acute   





(2) Seizure


Status: Acute   





(3) HLD (hyperlipidemia)


Status: Acute   





(4) CKD (chronic kidney disease) stage 3, GFR 30-59 ml/min


Status: Acute   





(5) HTN (hypertension)


Status: Chronic   





- Assessment and Plan (Free Text)


Plan: 





same immunosuppressants


check CSA level


neuro workup and rx

## 2018-05-17 NOTE — PN
DATE:  05/17/2018



TIME OF EVALUATION:  06:40 a.m.



NEUROLOGICAL PROBLEM:  New onset of seizures.



VITAL SIGNS:  Blood pressure 144/77, mean arterial pressure of 99,

respiratory rate 18, temperature 98.2, pulse rate 62 and regular.



The patient slept good.  No new complaints except lying down on his back

which bothers her.  The patient did have a workup as recommended.  So far,

the workup was negative.  Electroencephalogram will be reviewed.  The

patient was seen by cardiologist and also cleared from his cardiac point of

view.



His recent blood workup including hemoglobin 10.8, hematocrit 32, platelet

of 172, ESR 24, total protein 6.1, albumin 3.3, triglyceride 220,

cholesterol 224, , TSH 3.21.  Followup prolactin level is 11.5.



RECOMMENDATIONS:  If all workup is negative, the patient can be discharged,

the patient should be followed as outpatient.  Recommended further workup

including ambulatory electroencephalogram and polysomnogram should be

scheduled as outpatient.  Still I do not want to add any antiepileptic

drugs due to her polypharmacy and drug interaction.

Seizure precaution has been discussed with her.  The patient will be

followed closely with you.  If the patient is discharged, she should be

following me as outpatient.





__________________________________________

Agapito Arora MD





DD:  05/17/2018 7:01:15

DT:  05/17/2018 7:03:23

Job # [id number]

## 2018-05-17 NOTE — CP.PCM.PN
Objective





- Vital Signs/Intake and Output


Vital Signs (last 24 hours): 


 











Temp Pulse Resp BP Pulse Ox


 


 98.2 F   62   20   144/77   95 


 


 05/17/18 04:00  05/17/18 04:00  05/17/18 04:00  05/17/18 04:00  05/17/18 04:00








Intake and Output: 


 











 05/17/18 05/17/18





 06:59 18:59


 


Intake Total 480 


 


Balance 480 














- Medications


Medications: 


 Current Medications





Amlodipine Besylate (Norvasc)  5 mg PO HS [facility]


   Last Admin: 05/16/18 21:39 Dose:  5 mg


Aspirin (Ecotrin)  81 mg PO DAILY [facility]


   Last Admin: 05/16/18 10:16 Dose:  81 mg


Cyclosporine (Cyclosporine)  50 mg PO HS [facility]


   Last Admin: 05/16/18 21:40 Dose:  50 mg


Cyclosporine (Cyclosporine)  75 mg PO QAM [facility]


   Last Admin: 05/16/18 10:17 Dose:  75 mg


Heparin Sodium (Porcine) (Heparin)  5,000 units SC Q8 [facility]


   Last Admin: 05/17/18 05:49 Dose:  5,000 units


Sodium Chloride (Sodium Chloride 0.9%)  1,000 mls @ 70 mls/hr IV .[id number] [facility]


   Last Admin: 05/16/18 16:08 Dose:  Not Given


Metoprolol Succinate (Toprol Xl)  50 mg PO BID [facility]


   Last Admin: 05/16/18 18:07 Dose:  50 mg


Mycophenolate Mofetil (Cellcept)  500 mg PO BID [facility]


   Last Admin: 05/16/18 18:07 Dose:  500 mg


Omega-3-Acid Ethyl Esters (Lovaza)  1 gm PO BID [facility]


   Last Admin: 05/16/18 18:06 Dose:  1 gm


Prednisone (Prednisone Tab)  5 mg PO DAILY [facility]


   Last Admin: 05/16/18 10:15 Dose:  5 mg


Rosuvastatin Calcium (Crestor)  10 mg PO HS [facility]


Tramadol HCl (Ultram)  50 mg PO BID [facility]


   PRN Reason: Pain, moderate (4-7)


   Last Admin: 05/17/18 01:07 Dose:  50 mg


Trimethoprim/Sulfamethoxazole (Bactrim Ds Tab)  1 tab PO DAILY [facility]


   PRN Reason: Protocol


   Last Admin: 05/16/18 10:15 Dose:  1 tab











- Labs


Labs: 


 





 05/16/18 07:06 





 05/16/18 07:06

## 2018-07-30 NOTE — CT
Date of service: 



07/30/2018



PROCEDURE:  CT HEAD WITHOUT CONTRAST.



HISTORY:

Recurrent seizures 



COMPARISON:

05/15/2018. 



TECHNIQUE:

Axial computed tomography images were obtained through the head/brain 

without intravenous contrast.  



Coronal and sagittal reconstructed images.



Radiation dose:



Total exam DLP = 977.57 mGy-cm.



This CT exam was performed using one or more of the following dose 

reduction techniques: Automated exposure control, adjustment of the 

mA and/or kV according to patient size, and/or use of iterative 

reconstruction technique.



FINDINGS:



HEMORRHAGE:

No intracranial hemorrhage. 



BRAIN:

No mass effect or edema.  Stable atrophy and chronic microvascular 

ischemic changes.



VENTRICLES:

Unremarkable. No hydrocephalus. 



CALVARIUM:

Unremarkable.



PARANASAL SINUSES:

Unremarkable as visualized. No significant inflammatory changes.



MASTOID AIR CELLS:

Unremarkable as visualized. No inflammatory changes.



OTHER FINDINGS:

None.



IMPRESSION:

No acute intracranial abnormalities. No significant findings to 

account for the clinical presentation. No significant interval change 

compared to the prior examination(s).

## 2018-07-30 NOTE — C.PDOC
History Of Present Illness


71 y/o female with PMHx of HTN, renal disease s/p kidney transplant, brought in 

by ambulance and family for evaluation of seizure at home. Of note, patient was 

evaluated here in May 2018 with new onset seizure disorder. She had normal Head 

CT x2 on that day, and has had a total of 5 normal CT scans. Cardiac 

echocardiogram from that visit also showed valvular disease. Daughter at 

bedside describes witnessing tonic clonic seizure activity with LOC, and eyes 

rolling back, just prior to arrival. Patient then had a brief 15 min post-ictal 

period, and is now back at baseline as per daughter. Denies injury, chest pain, 

SOB, changes in vision, changes in speech, weakness, numbness, tongue biting, 

or incontinence.





Time Seen by Provider: 18 12:05


Chief Complaint (Nursing): Seizure


History Per: Family


History/Exam Limitations: no limitations


Recent Seizure Activity Began: Just Before Arrival


Number Of Seizures: One


Post-ictal Period: Duration In Mins: (15)


Additional History Per: EMS





Past Medical History


Reviewed: Historical Data, Nursing Documentation, Vital Signs


Vital Signs: 


 Last Vital Signs











Temp  99.3 F   18 10:42


 


Pulse  62   18 14:22


 


Resp  18   18 14:22


 


BP  132/51 L  18 14:22


 


Pulse Ox  100   18 14:22














- Medical History


PMH: HTN, Hypercholesterolemia, Chronic Kidney Disease, Seizures


Other Surgeries: Kidney transplant





- [facility] Procedures








MEASURE OF ARTERIAL PRESSURE, PERIPHERAL, EXTERN APPROACH (10/07/17)


MEASUREMENT OF CARDIAC RHYTHM, EXTERNAL APPROACH (10/07/17)








Family History: States: Unknown Family Hx





- Social History


Hx Alcohol Use: No


Hx Substance Use: No





- Immunization History


Hx Tetanus Toxoid Vaccination: No


Hx Influenza Vaccination: Yes


Hx Pneumococcal Vaccination: Yes (also recvd TB)





Review Of Systems


Eyes: Negative for: Vision Change


Cardiovascular: Negative for: Chest Pain


Respiratory: Negative for: Shortness of Breath


Neurological: Positive for: Seizures.  Negative for: Weakness, Numbness, 

Incoordination, Change in Speech





Physical Exam





- Physical Exam


Appears: Non-toxic, No Acute Distress, Other (Obese elderly female)


Skin: Normal Color, Warm, Dry


Head: Atraumatic, Normacephalic


Eye(s): bilateral: Normal Inspection, EOMI


Oral Mucosa: Moist


Tongue: Normal Appearing, No Bite, No Laceration


Teeth: Normal Dentition


Neck: Normal ROM, No Midline Cervical Tenderness, No Paracervical Tenderness, 

Supple


Chest: Symmetrical


Cardiovascular: Rhythm Regular, No Murmur


Respiratory: Normal Breath Sounds, No Rales, No Rhonchi, No Wheezing


Gastrointestinal/Abdominal: Soft, No Tenderness, No Distention


Back: Normal Inspection, No Vertebral Tenderness


Extremity: Bilateral: Atraumatic, Normal Color And Temperature, Normal ROM


Neurological/Psych: Other (At baseline as per daughter at beside)





ED Course And Treatment





- Laboratory Results


Result Diagrams: 


 18 12:30





 18 12:46


Lab Interpretation: Abnormal (+ microcytic anemia, mild, UA neg.)


ECG: Interpreted By Me


ECG Rhythm: Sinus Rhythm


ECG Interpretation: Normal


Rate From EC


O2 Sat by Pulse Oximetry: 99 (RA)


Pulse Ox Interpretation: Normal





- Radiology


CXR: Interpreted by Me


CXR Interpretation: Yes: No Acute Disease


Progress Note: keppra IV


Reevaluation Time: 13:20





- Physician Consult Information


Outcome Of Conversation: 1315: d/w Dr. Perez- PMD and prior Adm to same- ok to 

admit.  Discussed with Matt Howard





Medical Decision Making


Medical Decision Making: 





recurrent seizure.


first szr 


5 prior head CT's all wnl


No current szr meds


Keppra IV started


adm





Disposition


Doctor Will See Patient In The: Hospital


Counseled Patient/Family Regarding: Studies Performed, Diagnosis





- Disposition


Disposition: HOSPITALIZED


Disposition Time: 13:21


Condition: GOOD





- Clinical Impression


Clinical Impression: 


 Seizure








- Scribe Statement


The provider has reviewed the documentation as recorded by the Dioni Landaverde





Provider Attestation: 





All medical record entries made by the Dioni were at my direction and 

personally dictated by me. I have reviewed the chart and agree that the record 

accurately reflects my personal performance of the history, physical exam, 

medical decision making, and the department course for this patient. I have 

also personally directed, reviewed, and agree with the discharge instructions 

and disposition.

## 2018-07-30 NOTE — RAD
Date of service: 



07/30/2018



PROCEDURE:  CHEST RADIOGRAPH, 1 VIEW



HISTORY:

Seizure



COMPARISON:

5/15/2018



FINDINGS:



LUNGS:

Clear.



PLEURA:

No pneumothorax or pleural fluid seen.



CARDIOVASCULAR:

Normal 



OSSEOUS STRUCTURES:

Spinal stimulator noted.



VISUALIZED UPPER ABDOMEN:

Normal.



OTHER FINDINGS:

None. 



IMPRESSION:

No active disease.

## 2018-07-30 NOTE — CP.PCM.HP
History of Present Illness





- History of Present Illness


History of Present Illness: 





Pt is a 71yo female with a PMH of HLD, HTN, ESRD secondary to HTN status post 

renal trasnplant in . Pt was hospitalized May 2018 for a seizure where she 

had 2 neg CTs and was instructed to follow up with neurology, with which she 

did not comply. Pt reports feeling weak and unbalanced this morning and had to 

be helped to the bathroom by her daughter. Afterwards, she was assisted to sit 

down in a chair where she sustained a 5 minute seizure according to the 

daughter. Daughter describes the seizure as constant shaking with tongue biting 

and cyanosis. She remained sitting down post-ictal for another 15 minutes. She 

was immediately brought to [facility] ED by ambulance. Pt does not recall the 

seizure but can remember events before and after. Pt denies loss of bowel 

control during her episode.  Pt reports bilateral shoulder pain. She denies any 

chest pain, N/V, SOB, dizziness or lightheadedness. Denies loss of vision. 








PMH as stated above


PSH Renal transplant . Lumbar spine laminectomy unsure of year


Medications: Metoprolol 50mg PO BID, Norvasc 5mg PO QHS, Cellcept 500mg PO BID, 

Cyclosporine 75mg PO QAM and 50mg PO QHS, Prednisone 5mg PO QD, Bactrim DS 1 

tab PO QD, Lovaza 1000mg 2 tabs PO QD, Tramadol 50mg PO BID


Allergies: amoxicillin (swelling, dyspnea)


Family history: son  at age 45, history of Wiskott-Darryn syndrome


Social history: light smoker 40+ years ago; rare alcohol drinker; denies drug 

use; lives with daughter





PMD: Dr. Crawford








Present on Admission





- Present on Admission


Any Indicators Present on Admission: No


History of DVT/PE: No


History of Uncontrolled Diabetes: No


Urinary Catheter: No


Decubitus Ulcer Present: No





Review of Systems





- Review of Systems


All systems: reviewed and no additional remarkable complaints except (fatigue)





- Constitutional


Constitutional: Chills, Headache.  absent: Weight Gain, Weight Loss





- EENT


Eyes: As Per HPI.  absent: Change in Vision


Ears: absent: Tinnitus, Dizziness


Nose/Mouth/Throat: Mouth Lesions


Additional comments: 





Markedly cyanotic and ecchymotic tip of tongue.





- Cardiovascular


Cardiovascular: absent: Chest Pain, Lightheadedness, Palpitations, Pedal Edema, 

Radiating Pain





- Respiratory


Respiratory: Cough.  absent: Wheezing, Pain on Inspiration, Pain with Coughing





- Gastrointestinal


Gastrointestinal: absent: Change in Stool Character, Diarrhea, Dysphagia





- Genitourinary


Genitourinary: absent: Urinary Frequency, Freq UTI





- Musculoskeletal


Musculoskeletal: Back Pain


Additional comments: 





bilateral shoulder pain





- Integumentary


Integumentary: absent: Change in Pigmentation, Rash, Swelling, Jaundice





- Neurological


Neurological: Headaches, Syncope


Additional comments: 


Seizure





Past Patient History





- Infectious Disease


Hx of Infectious Diseases: None





- Tetanus Immunizations


Tetanus Immunization: Unknown





- Past Medical History & Family History


Past Medical History?: Yes





- Past Social History


Smoking Status: Former Smoker


Alcohol: None


Drugs: Denies


Home Situation {Lives}: With Family





- CARDIAC


Hx Hypercholesterolemia: Yes


Hx Hypertension: Yes





- PULMONARY


Hx Respiratory Disorders: No





- NEUROLOGICAL


Hx Seizures: Yes (May 2018)





- HEENT


Hx Cataracts: Yes





- RENAL


Hx Chronic Kidney Disease: Yes


Other/Comment: Renal Transplant





- ENDOCRINE/METABOLIC


Hx Endocrine Disorders: No





- MUSCULOSKELETAL/RHEUMATOLOGICAL


Hx Falls: Yes





- GASTROINTESTINAL


Hx Gastrointestinal Disorders: No





- PSYCHIATRIC


Hx Substance Use: No





- SURGICAL HISTORY


Hx Surgeries: Yes


Other/Comment: Rt. kidney transplant Aug. 3,2003.  L AVF





- ANESTHESIA


Hx Anesthesia: Yes


Hx Anesthesia Reactions: No


Hx Malignant Hyperthermia: No





Meds


Allergies/Adverse Reactions: 


 Allergies











Allergy/AdvReac Type Severity Reaction Status Date / Time


 


amoxicillin AdvReac Severe  Verified 18 10:46














Physical Exam





- Constitutional


Appears: No Acute Distress


Additional comments: 





fatigued 





- Head Exam


Head Exam: ATRAUMATIC, NORMAL INSPECTION





- Eye Exam


Eye Exam: EOMI, Normal appearance


Pupil Exam: NORMAL ACCOMODATION





- ENT Exam


Additional comments: 





tip of tongue has cynosis and ecchymosis 





- Neck Exam


Neck exam: Positive for: Full Rom





- Respiratory Exam


Respiratory Exam: Wheezes (Left lower trang field)





- Cardiovascular Exam


Cardiovascular Exam: RRR, +S1, +S2





- GI/Abdominal Exam


GI & Abdominal Exam: Soft.  absent: Distended, Guarding, Organomegaly





- Extremities Exam


Extremities exam: Positive for: normal capillary refill.  Negative for: pedal 

edema, tenderness





- Neurological Exam


Neurological exam: Alert, CN II-XII Intact, Oriented x3


Additional comments: 





Positive left sided gann's sign, slight tremor noted. No clonus noted, 

normal brachiradialis reflexes bilaterally 





Results





- Vital Signs


Recent Vital Signs: 





 Last Vital Signs











Temp  100.6 F H  18 17:54


 


Pulse  71   18 17:54


 


Resp  18   18 17:54


 


BP  137/90   18 17:54


 


Pulse Ox  95   18 17:54














- Labs


Result Diagrams: 


 18 12:30





 18 12:46


Labs: 





 Laboratory Results - last 24 hr











  18





  12:30 12:30 12:30


 


WBC  9.2  


 


RBC  3.96  


 


Hgb  10.0 L  


 


Hct  30.6 L  


 


MCV  77.3 L  


 


MCH  25.2 L  


 


MCHC  32.6 L  


 


RDW  15.9 H  


 


Plt Count  211  


 


MPV  8.0  


 


Neut % (Auto)  71.9  


 


Lymph % (Auto)  16.8 L  


 


Mono % (Auto)  10.3 H  


 


Eos % (Auto)  0.5  


 


Baso % (Auto)  0.5  


 


Neut # (Auto)  6.6  


 


Lymph # (Auto)  1.6  


 


Mono # (Auto)  1.0 H  


 


Eos # (Auto)  0.0  


 


Baso # (Auto)  0.0  


 


Sodium   


 


Potassium   


 


Chloride   


 


Carbon Dioxide   


 


Anion Gap   


 


BUN   


 


Creatinine   


 


Est GFR ( Amer)   


 


Est GFR (Non-Af Amer)   


 


POC Glucose (mg/dL)   


 


Random Glucose   


 


Calcium   


 


Total Bilirubin   


 


AST   


 


ALT   


 


Alkaline Phosphatase   


 


Total Creatine Kinase   


 


Total Protein   


 


Albumin   


 


Globulin   


 


Albumin/Globulin Ratio   


 


Urine Color   Yellow 


 


Urine Clarity   Clear 


 


Urine pH   5.0 


 


Ur Specific [place]   1.011 


 


Urine Protein   1+ H 


 


Urine Glucose (UA)   Normal 


 


Urine Ketones   Negative 


 


Urine Blood   Negative 


 


Urine Nitrate   Negative 


 


Urine Bilirubin   Negative 


 


Urine Urobilinogen   Normal 


 


Ur Leukocyte Esterase   Neg 


 


Urine WBC (Auto)   1 


 


Urine RBC (Auto)   < 1 


 


Ur Squamous Epith Cells   < 1 


 


Urine Opiates Screen    Negative


 


Urine Methadone Screen    Negative


 


Ur Barbiturates Screen    Negative


 


Ur Phencyclidine Scrn    Negative


 


Ur Amphetamines Screen    Negative


 


U Benzodiazepines Scrn    Negative


 


U Oth Cocaine Metabols    Negative


 


U Cannabinoids Screen    Negative














  18





  12:46 12:46


 


WBC  


 


RBC  


 


Hgb  


 


Hct  


 


MCV  


 


MCH  


 


MCHC  


 


RDW  


 


Plt Count  


 


MPV  


 


Neut % (Auto)  


 


Lymph % (Auto)  


 


Mono % (Auto)  


 


Eos % (Auto)  


 


Baso % (Auto)  


 


Neut # (Auto)  


 


Lymph # (Auto)  


 


Mono # (Auto)  


 


Eos # (Auto)  


 


Baso # (Auto)  


 


Sodium  139 


 


Potassium  3.6 


 


Chloride  104 


 


Carbon Dioxide  25 


 


Anion Gap  13 


 


BUN  26 H 


 


Creatinine  1.4 H 


 


Est GFR ( Amer)  44 


 


Est GFR (Non-Af Amer)  37 


 


POC Glucose (mg/dL)   93


 


Random Glucose  91 


 


Calcium  8.8 


 


Total Bilirubin  0.4 


 


AST  28 


 


ALT  32 


 


Alkaline Phosphatase  57 


 


Total Creatine Kinase  323 H 


 


Total Protein  5.7 L 


 


Albumin  3.5 


 


Globulin  2.2 


 


Albumin/Globulin Ratio  1.6 


 


Urine Color  


 


Urine Clarity  


 


Urine pH  


 


Ur Specific Gravity  


 


Urine Protein  


 


Urine Glucose (UA)  


 


Urine Ketones  


 


Urine Blood  


 


Urine Nitrate  


 


Urine Bilirubin  


 


Urine Urobilinogen  


 


Ur Leukocyte Esterase  


 


Urine WBC (Auto)  


 


Urine RBC (Auto)  


 


Ur Squamous Epith Cells  


 


Urine Opiates Screen  


 


Urine Methadone Screen  


 


Ur Barbiturates Screen  


 


Ur Phencyclidine Scrn  


 


Ur Amphetamines Screen  


 


U Benzodiazepines Scrn  


 


U Oth Cocaine Metabols  


 


U Cannabinoids Screen  














- Imaging and Cardiology


  ** CT scan - head


Status: Image reviewed by me, Report reviewed by me (As per radiology - no 

acute intracranial abnormalities, no interval change from prior CT)





  ** Chest x-ray


Status: Report reviewed by me (no active disease)





Assessment & Plan





- Assessment and Plan (Free Text)


Assessment: 





71yo female status post 2nd episode of seizure (previous may 2018) admitted to 

telemetry for seizure assessment.


Plan: 





Recurrent Seizure


-Patient admitted to Telemetry


-Neurology consulted : Dr Arora


-CT head: Neg


-seizure precautions


-Follow BMP


-add magnesium


-Keppra 500mg


-fall risk precautions





History of Renal Transplant


-Nepthrology consulted : Ty


-Follow BUN/Cr


-Prednisone 5mg


-Cyclosporine 25mg TID


-Cellcept 75mg BID


-Bactrim UTI prophylaxis 1 tab daily





HTN


-HCTZ 12.5mg q72hrs


-Norvasc 5mg daily


-Toprol 50mg BID





Aortic Stenosis


-follow up outpt





HLD


-Lovaza 0.001mg





DVT prophylaxis


-Heparin sub q

## 2018-07-31 NOTE — CP.PCM.CON
History of Present Illness





- History of Present Illness


History of Present Illness: 





seen and examined





Pt is a 73yo female with a PMH of HLD, HTN, ESRD secondary to HTN status post 

renal trasnplant in . Admitetd after a seizure episode at home, prior hx of 

seizures w/ poor follow up. .  She denies any chest pain, N/V, SOB, dizziness 

or lightheadedness. Denies loss of vision. 


Good urine output, no dysuria or hematuria





PMH as stated above


PSH Renal transplant . Lumbar spine laminectomy unsure of year


Medications: Metoprolol 50mg PO BID, Norvasc 5mg PO QHS, Cellcept 500mg PO BID, 

Cyclosporine 75mg PO QAM and 50mg PO QHS, Prednisone 5mg PO QD, Bactrim DS 1 

tab PO QD, Lovaza 1000mg 2 tabs PO QD, Tramadol 50mg PO BID


Allergies: amoxicillin (swelling, dyspnea)


Family history: son  at age 45, history of Wiskott-Darryn syndrome


Social history: light smoker 40+ years ago; rare alcohol drinker; denies drug 

use; lives with daughter





Review of Systems





- Review of Systems


All systems: reviewed and no additional remarkable complaints except (10 point 

ROS negative except HPI)





Past Patient History





- Infectious Disease


Hx of Infectious Diseases: None





- Tetanus Immunizations


Tetanus Immunization: Unknown





- Past Medical History & Family History


Past Medical History?: Yes





- Past Social History


Smoking Status: Former Smoker


Alcohol: None


Drugs: Denies


Home Situation {Lives}: With Family





- CARDIAC


Hx Hypercholesterolemia: Yes


Hx Hypertension: Yes





- PULMONARY


Hx Respiratory Disorders: No





- NEUROLOGICAL


Hx Seizures: Yes (May 2018)





- HEENT


Hx Cataracts: Yes





- RENAL


Hx Chronic Kidney Disease: Yes


Other/Comment: Renal Transplant





- ENDOCRINE/METABOLIC


Hx Endocrine Disorders: No





- MUSCULOSKELETAL/RHEUMATOLOGICAL


Hx Falls: Yes





- GASTROINTESTINAL


Hx Gastrointestinal Disorders: No





- PSYCHIATRIC


Hx Substance Use: No





- SURGICAL HISTORY


Hx Surgeries: Yes


Other/Comment: Rt. kidney transplant Aug. 3,2003.  L AVF





- ANESTHESIA


Hx Anesthesia: Yes


Hx Anesthesia Reactions: No


Hx Malignant Hyperthermia: No





Meds


Allergies/Adverse Reactions: 


 Allergies











Allergy/AdvReac Type Severity Reaction Status Date / Time


 


amoxicillin AdvReac Severe  Verified 18 10:46














- Medications


Medications: 


 Current Medications





Acetaminophen (Tylenol 325mg Tab)  650 mg PO Q6 PRN


   PRN Reason: Pain, moderate (4-7)


   Last Admin: 18 18:15 Dose:  650 mg


Amlodipine Besylate (Norvasc)  5 mg PO DAILY [facility]


Docusate Sodium (Colace)  100 mg PO BID [facility]


   Last Admin: 18 18:40 Dose:  100 mg


Heparin Sodium (Porcine) (Heparin)  5,000 units SC Q12 [facility]


   Last Admin: 18 22:41 Dose:  5,000 units


Hydrochlorothiazide (Microzide)  12.5 mg PO Q72 [facility]


Levetiracetam (Keppra)  750 mg PO BID [facility]


Metoprolol Succinate (Toprol Xl)  50 mg PO BID [facility]


   Last Admin: 18 18:40 Dose:  50 mg


Mycophenolate Mofetil (Cellcept)  500 mg PO BID [facility]


   Last Admin: 18 18:40 Dose:  500 mg


Omega-3-Acid Ethyl Esters (Lovaza)  0.001 gm PO BID [facility]


   Last Admin: 18 18:40 Dose:  0.001 gm


Prednisone (Prednisone Tab)  5 mg PO DAILY [facility]


Tramadol HCl (Ultram)  50 mg PO BID PRN


   PRN Reason: Pain


Trimethoprim/Sulfamethoxazole (Bactrim Ss Tab)  1 tab PO DAILY [facility]


   PRN Reason: Protocol











Physical Exam





- Constitutional


Appears: Non-toxic, No Acute Distress, Chronically Ill





- Head Exam


Head Exam: NORMAL INSPECTION, NORMOCEPHALIC





- Eye Exam


Eye Exam: Normal appearance, PERRL





- ENT Exam


ENT Exam: Mucous Membranes Moist, Normal Exam





- Neck Exam


Neck exam: Positive for: Normal Inspection





- Respiratory Exam


Respiratory Exam: Clear to Auscultation Bilateral, NORMAL BREATHING PATTERN





- Cardiovascular Exam


Cardiovascular Exam: REGULAR RHYTHM, RRR





- GI/Abdominal Exam


GI & Abdominal Exam: Normal Bowel Sounds, Soft (no tenderness over transplant 

kidney)





- Extremities Exam


Extremities exam: Positive for: normal inspection





- Back Exam


Back exam: NORMAL INSPECTION





- Neurological Exam


Neurological exam: Alert, Oriented x3





- Psychiatric Exam


Psychiatric exam: Normal Affect, Normal Mood





- Skin


Skin Exam: Dry, Intact





Results





- Vital Signs


Recent Vital Signs: 


 Last Vital Signs











Temp  98.6 F   18 05:12


 


Pulse  64   18 05:12


 


Resp  20   18 05:12


 


BP  149/71   18 05:12


 


Pulse Ox  96   18 00:39














- Labs


Result Diagrams: 


 18 08:17





 18 08:17


Labs: 


 Laboratory Results - last 24 hr











  18





  12:30 12:30 12:30


 


WBC  9.2  


 


RBC  3.96  


 


Hgb  10.0 L  


 


Hct  30.6 L  


 


MCV  77.3 L  


 


MCH  25.2 L  


 


MCHC  32.6 L  


 


RDW  15.9 H  


 


Plt Count  211  


 


MPV  8.0  


 


Neut % (Auto)  71.9  


 


Lymph % (Auto)  16.8 L  


 


Mono % (Auto)  10.3 H  


 


Eos % (Auto)  0.5  


 


Baso % (Auto)  0.5  


 


Neut # (Auto)  6.6  


 


Lymph # (Auto)  1.6  


 


Mono # (Auto)  1.0 H  


 


Eos # (Auto)  0.0  


 


Baso # (Auto)  0.0  


 


Sodium   


 


Potassium   


 


Chloride   


 


Carbon Dioxide   


 


Anion Gap   


 


BUN   


 


Creatinine   


 


Est GFR ( Amer)   


 


Est GFR (Non-Af Amer)   


 


POC Glucose (mg/dL)   


 


Random Glucose   


 


Calcium   


 


Phosphorus   


 


Magnesium   


 


Total Bilirubin   


 


AST   


 


ALT   


 


Alkaline Phosphatase   


 


Total Creatine Kinase   


 


Troponin I   


 


Total Protein   


 


Albumin   


 


Globulin   


 


Albumin/Globulin Ratio   


 


Prolactin   


 


Urine Color   Yellow 


 


Urine Clarity   Clear 


 


Urine pH   5.0 


 


Ur Specific [place]   1.011 


 


Urine Protein   1+ H 


 


Urine Glucose (UA)   Normal 


 


Urine Ketones   Negative 


 


Urine Blood   Negative 


 


Urine Nitrate   Negative 


 


Urine Bilirubin   Negative 


 


Urine Urobilinogen   Normal 


 


Ur Leukocyte Esterase   Neg 


 


Urine WBC (Auto)   1 


 


Urine RBC (Auto)   < 1 


 


Ur Squamous Epith Cells   < 1 


 


Urine Opiates Screen    Negative


 


Urine Methadone Screen    Negative


 


Ur Barbiturates Screen    Negative


 


Ur Phencyclidine Scrn    Negative


 


Ur Amphetamines Screen    Negative


 


U Benzodiazepines Scrn    Negative


 


U Oth Cocaine Metabols    Negative


 


U Cannabinoids Screen    Negative














  18





  12:46 12:46 18:39


 


WBC   


 


RBC   


 


Hgb   


 


Hct   


 


MCV   


 


MCH   


 


MCHC   


 


RDW   


 


Plt Count   


 


MPV   


 


Neut % (Auto)   


 


Lymph % (Auto)   


 


Mono % (Auto)   


 


Eos % (Auto)   


 


Baso % (Auto)   


 


Neut # (Auto)   


 


Lymph # (Auto)   


 


Mono # (Auto)   


 


Eos # (Auto)   


 


Baso # (Auto)   


 


Sodium  139  


 


Potassium  3.6  


 


Chloride  104  


 


Carbon Dioxide  25  


 


Anion Gap  13  


 


BUN  26 H  


 


Creatinine  1.4 H  


 


Est GFR ( Amer)  44  


 


Est GFR (Non-Af Amer)  37  


 


POC Glucose (mg/dL)   93 


 


Random Glucose  91  


 


Calcium  8.8  


 


Phosphorus   


 


Magnesium   


 


Total Bilirubin  0.4  


 


AST  28  


 


ALT  32  


 


Alkaline Phosphatase  57  


 


Total Creatine Kinase  323 H  


 


Troponin I    0.0450


 


Total Protein  5.7 L  


 


Albumin  3.5  


 


Globulin  2.2  


 


Albumin/Globulin Ratio  1.6  


 


Prolactin   


 


Urine Color   


 


Urine Clarity   


 


Urine pH   


 


Ur Specific Gravity   


 


Urine Protein   


 


Urine Glucose (UA)   


 


Urine Ketones   


 


Urine Blood   


 


Urine Nitrate   


 


Urine Bilirubin   


 


Urine Urobilinogen   


 


Ur Leukocyte Esterase   


 


Urine WBC (Auto)   


 


Urine RBC (Auto)   


 


Ur Squamous Epith Cells   


 


Urine Opiates Screen   


 


Urine Methadone Screen   


 


Ur Barbiturates Screen   


 


Ur Phencyclidine Scrn   


 


Ur Amphetamines Screen   


 


U Benzodiazepines Scrn   


 


U Oth Cocaine Metabols   


 


U Cannabinoids Screen   














  18





  18:39 08:17 08:17


 


WBC   5.2 


 


RBC   3.90 


 


Hgb   10.0 L 


 


Hct   30.0 L 


 


MCV   77.0 L 


 


MCH   25.6 L 


 


MCHC   33.3 


 


RDW   15.9 H 


 


Plt Count   179 


 


MPV   7.4 


 


Neut % (Auto)   


 


Lymph % (Auto)   


 


Mono % (Auto)   


 


Eos % (Auto)   


 


Baso % (Auto)   


 


Neut # (Auto)   


 


Lymph # (Auto)   


 


Mono # (Auto)   


 


Eos # (Auto)   


 


Baso # (Auto)   


 


Sodium    142


 


Potassium    3.7


 


Chloride    106


 


Carbon Dioxide    25


 


Anion Gap    13


 


BUN    20 H


 


Creatinine    1.3 H


 


Est GFR ( Amer)    48


 


Est GFR (Non-Af Amer)    40


 


POC Glucose (mg/dL)   


 


Random Glucose    93


 


Calcium    9.1


 


Phosphorus  3.3   3.8


 


Magnesium  2.0   2.1


 


Total Bilirubin    0.6


 


AST    32


 


ALT    31


 


Alkaline Phosphatase    57


 


Total Creatine Kinase   


 


Troponin I   


 


Total Protein    5.6 L


 


Albumin    3.4 L


 


Globulin    2.2


 


Albumin/Globulin Ratio    1.5


 


Prolactin  13.2  


 


Urine Color   


 


Urine Clarity   


 


Urine pH   


 


Ur Specific Gravity   


 


Urine Protein   


 


Urine Glucose (UA)   


 


Urine Ketones   


 


Urine Blood   


 


Urine Nitrate   


 


Urine Bilirubin   


 


Urine Urobilinogen   


 


Ur Leukocyte Esterase   


 


Urine WBC (Auto)   


 


Urine RBC (Auto)   


 


Ur Squamous Epith Cells   


 


Urine Opiates Screen   


 


Urine Methadone Screen   


 


Ur Barbiturates Screen   


 


Ur Phencyclidine Scrn   


 


Ur Amphetamines Screen   


 


U Benzodiazepines Scrn   


 


U Oth Cocaine Metabols   


 


U Cannabinoids Screen   














Assessment & Plan


(1) Seizure


Status: Acute   





(2) Anemia


Status: Acute   Priority: High   





(3) CKD (chronic kidney disease) stage 3, GFR 30-59 ml/min


Status: Acute   





(4) HLD (hyperlipidemia)


Status: Acute   





(5) History of kidney transplant


Status: Acute   





- Assessment and Plan (Free Text)


Assessment: 





stable renal function


encourage po fluids


maintain home meds


bp acceptable


seizure management per neurology


monitor cyclosporine levels if remains inpatient

## 2018-07-31 NOTE — CON
Copied To:  Agapito Arora MD

Attending MD:  Agapito Arora MD



DATE:  07/31/2018



ATTENDING PHYSICIAN:  James Perez MD



The patient is in room #665, bed B.



REASON FOR THE CONSULTATION:  Seizures.



CHIEF COMPLAINT:  The patient was brought into [facility] with a

history of witnessed seizure activities at home.  From neurological point

of view, I was called in to evaluate her for further management.



HISTORY OF PRESENT ILLNESS:  Ms. Stiven Jackson is a 72-year-old moderately

obese right-handed female brought into [facility] with history of

witnessed seizure activities in front of her daughter, which lasted for

about few minutes and post-event confusion, not associating with bowel and

bladder incontinence; however, bitten tongue was documented.  History of

admission in [facility] on 05/15/2018 with history of similar episode,

that time was the first episode.  After the workup, the patient was not

started on antiepileptic medication.  Three months prior to that, she had

one fall with no reason and no recollection why she did fall.  These are

all from the previous examination during her last admission.



PAST MEDICAL HISTORY:  She is hypertensive, chronic renal failure, status

post renal transplantation from 2003, been on CellCept and cyclosporin for

more than 15 years.  She is also taking prednisone and Bactrim for

prophylaxis.  Has been on tramadol.



PERSONAL HISTORY:  Denies smoking or alcohol use.



ALLERGIES:  NO KNOWN ALLERGIES.



REVIEW OF SYSTEMS:  12-point systems being reviewed.  From neuro, seizures.



MEDICATIONS:  Toprol, Microzide, Ultram, Norvasc, Bactrim, CellCept,

Toprol, Colace, Tylenol.



PHYSICAL EXAMINATION:

VITAL SIGNS:  Blood pressure 149/71, mean arterial pressure of 97,

respiratory rate 18, pulse rate 64, temperature 98.6.

NECK:  Supple.  No carotid bruits.

HEART:  Heart sounds regular.

CHEST:  Fair air entry.

EXTREMITIES:  No edema in legs.

NEUROLOGIC EXAMINATION:  Mental status examination:  She is awake, alert,

oriented to person, place, and time.  Significant retrograde amnesia.  No

antegrade amnesia.  No confusion.  No hallucination.  No suicidal ideation.

No sign of depression.  Cranial nerve examination:  Visual field intact. 

Pupils reactive to light.  Extraocular movement normal.  No nystagmus.  No

facial sensory deficit.  No facial asymmetry.  Hearing is normal.  Tongue

is midline.  Good gag.  Motor examination:  Outstretched hand with eyes

closed, no drift noted.  Power is symmetric on either side.  Deep tendon

reflexes absent throughout.  Plantars are mute.  Sensory examination: 

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  Position sense

is intact.  Coordination:  Finger-nose-finger test is intact.



WORKUP:  CT of the head reviewed, which is also reported as negative for

acute pathology.  EKG normal sinus rhythm.



Blood Workup:  WBC 9.2, hemoglobin 10, hematocrit 30.6, platelet 211,000. 

Sodium 139, potassium 3.6, chloride 104, bicarbonate 25, BUN 26, creatinine

1.4, magnesium 2, phosphorus 3.3, calcium 8.8.  , total protein 5.7. 

Urine shows 1+ proteinuria.  Drug tox screen negative.



CONCLUSION:  Ms. Stiven Jackson, as per neurological examination, presenting

with mildly distal sensory motor neuropathy.  No long tract sign as seen or

observed at present.  From her history and previous admission, as these are

recurrent seizures, the patient should be on antiepileptic drugs, and

workup should be completed including MRI of the brain and

electroencephalogram.



The patient's daughter states that she had spinal implant of titanium from

her previous surgery.  If she has a titanium stent, she could go for MRI of

the brain; however, that has to be confirmed from previous documents.



RECOMMENDATIONS:  Continue hydration.  Keppra should be continued.  _____

dosage can be increased to 750 mg to keep the dose twice a day.  We will

review the electroencephalogram as above.  If the patient is stable for the

next 24-hour period, the patient can be discharged and should follow up

with me as outpatient.





__________________________________________

Agapito Arora MD



DD:  07/31/2018 7:14:18

DT:  07/31/2018 7:17:42

Job # [id number]

## 2018-07-31 NOTE — CARD
--------------- APPROVED REPORT --------------





Date of service: 07/30/2018



EKG Measurement

Heart [id number]

CT [id number]

[id number]

[id number]

[id number]



<Conclusion>

Normal sinus rhythm

Right bundle branch block

Abnormal ECG

## 2018-07-31 NOTE — CP.PCM.PN
<Miles Goode - Last Filed: 07/31/18 18:33>





Subjective





- Date & Time of Evaluation


Date of Evaluation: 07/31/18


Time of Evaluation: 10:30





- Subjective


Subjective: 


PGY-1 Medicine Progress Note for Dr. Perez's service





Patient seen and examined at bedside. Patient offers no acute complaints. 

Patient has implanted stimulator device in spine s/p 2 x laminectomy (2009, 2011

) which is why she did not have MRI today. Patient denies confusion, lethargy, 

chest pain, sob, n/v, constipation or diarrhea, and headaches. 





Objective





- Vital Signs/Intake and Output


Vital Signs (last 24 hours): 


 











Temp Pulse Resp BP Pulse Ox


 


 98.1 F   72   20   135/62   97 


 


 07/31/18 15:07  07/31/18 16:00  07/31/18 15:07  07/31/18 15:07  07/31/18 15:07








Intake and Output: 


 











 07/31/18 07/31/18





 06:59 18:59


 


Intake Total 150 


 


Balance 150 














- Medications


Medications: 


 Current Medications





Acetaminophen (Tylenol 325mg Tab)  650 mg PO Q6 PRN


   PRN Reason: Pain, moderate (4-7)


   Last Admin: 07/30/18 18:15 Dose:  650 mg


Amlodipine Besylate (Norvasc)  5 mg PO DAILY [facility]


   Last Admin: 07/31/18 10:45 Dose:  5 mg


Cyclosporine (Cyclosporine)  75 mg PO DAILY@0800 [facility]


   Last Admin: 07/31/18 11:46 Dose:  75 mg


Cyclosporine (Cyclosporine)  50 mg PO DAILY@2000 [facility]


Docusate Sodium (Colace)  100 mg PO BID [facility]


   Last Admin: 07/31/18 17:46 Dose:  100 mg


Heparin Sodium (Porcine) (Heparin)  5,000 units SC Q12 [facility]


   Last Admin: 07/31/18 11:03 Dose:  Not Given


Hydrochlorothiazide (Microzide)  12.5 mg PO Q72 [facility]


Levetiracetam (Keppra)  750 mg PO BID [facility]


   Last Admin: 07/31/18 17:47 Dose:  750 mg


Metoprolol Succinate (Toprol Xl)  50 mg PO BID [facility]


   Last Admin: 07/31/18 17:47 Dose:  50 mg


Mycophenolate Mofetil (Cellcept)  500 mg PO BID [facility]


   Last Admin: 07/31/18 17:46 Dose:  500 mg


Omega-3-Acid Ethyl Esters (Lovaza)  0.001 gm PO BID [facility]


   Last Admin: 07/31/18 17:47 Dose:  0.001 gm


Prednisone (Prednisone Tab)  5 mg PO DAILY [facility]


   Last Admin: 07/31/18 10:46 Dose:  5 mg


Tramadol HCl (Ultram)  50 mg PO BID PRN


   PRN Reason: Pain


Trimethoprim/Sulfamethoxazole (Bactrim Ss Tab)  1 tab PO DAILY [facility]


   PRN Reason: Protocol


   Last Admin: 07/31/18 10:46 Dose:  1 tab











- Labs


Labs: 


 





 07/31/18 08:17 





 07/31/18 08:17 











- Constitutional


Appears: Well, No Acute Distress





- Head Exam


Head Exam: ATRAUMATIC, NORMAL INSPECTION, NORMOCEPHALIC





- Eye Exam


Eye Exam: Normal appearance.  absent: Nystagmus, Scleral icterus





- ENT Exam


ENT Exam: Mucous Membranes Moist, Normal Exam





- Neck Exam


Neck Exam: Full ROM, Normal Inspection.  absent: Tenderness





- Respiratory Exam


Respiratory Exam: Clear to Ausculation Bilateral.  absent: Rales, Wheezes





- Cardiovascular Exam


Cardiovascular Exam: REGULAR RHYTHM, +S1, +S2





- GI/Abdominal Exam


GI & Abdominal Exam: Soft, Normal Bowel Sounds.  absent: Distended, Tenderness





- Extremities Exam


Extremities Exam: Normal Inspection.  absent: Pedal Edema





- Neurological Exam


Neurological Exam: Alert, Awake, Oriented x3.  absent: Motor Sensory Deficit


Neuro motor strength exam: Left Upper Extremity: 5, Right Upper Extremity: 5, 

Left Lower Extremity: 5, Right Lower Extremity: 5





- Psychiatric Exam


Psychiatric exam: Anxious





- Skin


Skin Exam: Normal Color, Warm





Assessment and Plan





- Assessment and Plan (Free Text)


Assessment: 





Patient is a 72 y.o with PMH seizure x 1 (may 2018) complicated with UTI, HLD, 

HTN, ESRD secondary to HTN status post right kidney transplant presenting for 

evaluation of seizure-like activity.


Plan: 





Seizure Disorder


Continue therapeutic dose Keppra 750 mg bid


Dr. rAora Consulted-recommnedations appreciated- patient has implanted spine 

device s/p laminectomies- MRI will be done outpatient with Dr. Arora


7/30/18 Head CT: no acute intracranial abnormalities; no significant findings 

to account for the clinical presentation. no signifcant interval change 

compared to prior examination


Electrolytes stable


Will monitor overnight


Likely discharge tomorrow





Hyperlipidemia


Lovaza 0.001mg





Hypertension


Continue BP meds


Metoprolol 50 mg po bid


HCTZ 12.5 mg po q72h [facility]


Amlodipine 5mg daily





ESRD s/p Kidney transplant


Stable Renal function


Euvolemic on exam


Cyclosporine 75mg po


Cellcept 500 po bid daily


prednisone 5mg daily


Bactrim 1 tab po daily


Monitor Cr/BUN


Nephro following: Continue to followup with management





Prophylaxis


Heparin 5000 units subq q12


GI ppx not indicated at this time 








<James Perez Jr. - Last Filed: 08/01/18 16:01>





Objective





- Vital Signs/Intake and Output


Vital Signs (last 24 hours): 


 











Temp Pulse Resp BP Pulse Ox


 


 98.5 F   70   20   122/70   95 


 


 08/01/18 15:09  08/01/18 15:09  08/01/18 15:09  08/01/18 15:09  08/01/18 15:09








Intake and Output: 


 











 08/01/18 08/01/18





 06:59 18:59


 


Intake Total 360 


 


Balance 360 














- Medications


Medications: 


 Current Medications





Acetaminophen (Tylenol 325mg Tab)  650 mg PO Q6 PRN


   PRN Reason: Pain, moderate (4-7)


   Last Admin: 07/30/18 18:15 Dose:  650 mg


Amlodipine Besylate (Norvasc)  5 mg PO DAILY [facility]


   Last Admin: 08/01/18 10:26 Dose:  5 mg


Cyclosporine (Cyclosporine)  75 mg PO DAILY@0800 [facility]


   Last Admin: 08/01/18 08:20 Dose:  75 mg


Cyclosporine (Cyclosporine)  50 mg PO DAILY@2000 [facility]


   Last Admin: 07/31/18 19:38 Dose:  50 mg


Docusate Sodium (Colace)  100 mg PO BID [facility]


   Last Admin: 08/01/18 10:26 Dose:  100 mg


Heparin Sodium (Porcine) (Heparin)  5,000 units SC Q12 [facility]


   Last Admin: 07/31/18 21:29 Dose:  Not Given


Hydrochlorothiazide (Microzide)  12.5 mg PO Q72 [facility]


Levetiracetam (Keppra)  750 mg PO BID [facility]


   Last Admin: 08/01/18 10:26 Dose:  750 mg


Metoprolol Succinate (Toprol Xl)  50 mg PO BID [facility]


   Last Admin: 08/01/18 10:26 Dose:  50 mg


Mycophenolate Mofetil (Cellcept)  500 mg PO BID [facility]


   Last Admin: 08/01/18 10:27 Dose:  500 mg


Omega-3-Acid Ethyl Esters (Lovaza)  0.001 gm PO BID [facility]


   Last Admin: 08/01/18 10:26 Dose:  0.001 gm


Prednisone (Prednisone Tab)  5 mg PO DAILY [facility]


   Last Admin: 08/01/18 10:26 Dose:  5 mg


Tramadol HCl (Ultram)  50 mg PO BID PRN


   PRN Reason: Pain











- Labs


Labs: 


 





 08/01/18 06:46 





 08/01/18 06:46 











Attending/Attestation





- Attestation


I have personally seen and examined this patient.: Yes


I have fully participated in the care of the patient.: Yes


I have reviewed all pertinent clinical information, including history, physical 

exam and plan: Yes


Notes (Text): 





08/01/18 16:01


Patient seen and examined. Reviewed resident note and agree with findings. 

Discussed plan of care.

## 2018-08-01 NOTE — CP.PCM.DIS
Provider





- Provider


Date of Admission: 


18 13:22





Attending physician: 


James Perez Jr, MD





Primary care physician: 





Dr. Anderson


Consults: 





Dr. Arora (neurology)


Time Spent in preparation of Discharge (in minutes): 35





Diagnosis





- Discharge Diagnosis


(1) History of kidney transplant


Status: Chronic   





(2) Seizure


Status: Acute   





(3) Aortic stenosis


Status: Chronic   





Hospital Course





- Lab Results


Lab Results: 


 Most Recent Lab Values











WBC  6.1 K/uL (4.8-10.8)   18  06:46    


 


RBC  4.07 Mil/uL (3.80-5.20)   18  06:46    


 


Hgb  10.5 g/dL (11.0-16.0)  L  18  06:46    


 


Hct  31.5 % (34.0-47.0)  L  18  06:46    


 


MCV  77.5 fL (81.0-99.0)  L  18  06:46    


 


MCH  25.7 pg (27.0-31.0)  L  18  06:46    


 


MCHC  33.2 g/dL (33.0-37.0)   18  06:46    


 


RDW  15.9 % (11.5-14.5)  H  18  06:46    


 


Plt Count  198 K/uL (130-400)   18  06:46    


 


MPV  7.5 fL (7.2-11.7)   18  06:46    


 


Neut % (Auto)  60.0 % (50.0-75.0)   18  06:46    


 


Lymph % (Auto)  25.7 % (20.0-40.0)   18  06:46    


 


Mono % (Auto)  13.0 % (0.0-10.0)  H  18  06:46    


 


Eos % (Auto)  1.0 % (0.0-4.0)   18  06:46    


 


Baso % (Auto)  0.3 % (0.0-2.0)   18  06:46    


 


Neut # (Auto)  3.7 K/uL (1.8-7.0)   18  06:46    


 


Lymph # (Auto)  1.6 K/uL (1.0-4.3)   18  06:46    


 


Mono # (Auto)  0.8 K/uL (0.0-0.8)   18  06:46    


 


Eos # (Auto)  0.1 K/uL (0.0-0.7)   18  06:46    


 


Baso # (Auto)  0.0 K/uL (0.0-0.2)   18  06:46    


 


Sodium  144 mmol/L (132-148)   18  06:46    


 


Potassium  3.9 mmol/L (3.6-5.2)   18  06:46    


 


Chloride  108 mmol/L ()  H  18  06:46    


 


Carbon Dioxide  25 mmol/L (22-30)   18  06:46    


 


Anion Gap  16  (10-20)   18  06:46    


 


BUN  18 mg/dL (7-17)  H  18  06:46    


 


Creatinine  1.4 mg/dL (0.7-1.2)  H  18  06:46    


 


Est GFR ( Amer)  44   18  06:46    


 


Est GFR (Non-Af Amer)  37   18  06:46    


 


POC Glucose (mg/dL)  107 mg/dL ()   18  11:35    


 


Random Glucose  107 mg/dL ()  H  18  06:46    


 


Calcium  8.9 mg/dl (8.6-10.4)   18  06:46    


 


Ionized Calcium  5.0 mg/dL (4.80-5.60)   18  08:17    


 


Phosphorus  3.7 mg/dL (2.5-4.5)   18  06:46    


 


Magnesium  2.1 mg/dL (1.6-2.3)   18  06:46    


 


Total Bilirubin  0.5 mg/dL (0.2-1.3)   18  06:46    


 


AST  32 U/L (14-36)   18  06:46    


 


ALT  29 U/L (9-52)   18  06:46    


 


Alkaline Phosphatase  54 U/L ()   18  06:46    


 


Total Creatine Kinase  323 U/L ()  H  18  12:46    


 


Troponin I  0.0450 ng/mL (0.00-0.120)   18  18:39    


 


Total Protein  5.7 g/dL (6.3-8.3)  L  18  06:46    


 


Albumin  3.4 g/dL (3.5-5.0)  L  18  06:46    


 


Globulin  2.2 gm/dL (2.2-3.9)   18  06:46    


 


Albumin/Globulin Ratio  1.5  (1.0-2.1)   18  06:46    


 


Prolactin  13.2 ng/mL (3.0-18.9)   18  18:39    


 


Urine Color  Yellow  (YELLOW)   18  12:30    


 


Urine Clarity  Clear  (Clear)   18  12:30    


 


Urine pH  5.0  (5.0-8.0)   18  12:30    


 


Ur Specific Gravity  1.011  (1.003-1.030)   18  12:30    


 


Urine Protein  1+ mg/dL (NEGATIVE)  H  18  12:30    


 


Urine Glucose (UA)  Normal mg/dL (Normal)   18  12:30    


 


Urine Ketones  Negative mg/dL (NEGATIVE)   18  12:30    


 


Urine Blood  Negative  (NEGATIVE)   18  12:30    


 


Urine Nitrate  Negative  (NEGATIVE)   18  12:30    


 


Urine Bilirubin  Negative  (NEGATIVE)   18  12:30    


 


Urine Urobilinogen  Normal mg/dL (0.2-1.0)   18  12:30    


 


Ur Leukocyte Esterase  Neg Tunde/uL (Negative)   18  12:30    


 


Urine WBC (Auto)  1 /hpf (0-5)   18  12:30    


 


Urine RBC (Auto)  < 1 /hpf (0-3)   18  12:30    


 


Ur Squamous Epith Cells  < 1 /hpf (0-5)   18  12:30    


 


Urine Opiates Screen  Negative  (NEGATIVE)   18  12:30    


 


Urine Methadone Screen  Negative  (NEGATIVE)   18  12:30    


 


Ur Barbiturates Screen  Negative  (NEGATIVE)   18  12:30    


 


Ur Phencyclidine Scrn  Negative  (NEGATIVE)   18  12:30    


 


Ur Amphetamines Screen  Negative  (NEGATIVE)   18  12:30    


 


U Benzodiazepines Scrn  Negative  (NEGATIVE)   18  12:30    


 


U Oth Cocaine Metabols  Negative  (NEGATIVE)   18  12:30    


 


U Cannabinoids Screen  Negative  (NEGATIVE)   18  12:30    














- Hospital Course


Hospital Course: 





PMH as stated above


PSH Renal transplant . Lumbar spine laminectomy unsure of year


Medications: Metoprolol 50mg PO BID, Norvasc 5mg PO QHS, Cellcept 500mg PO BID, 

Cyclosporine 75mg PO QAM and 50mg PO QHS, Prednisone 5mg PO QD, Bactrim DS 1 

tab PO QD, Lovaza 1000mg 2 tabs PO QD, Tramadol 50mg PO BID


Allergies: amoxicillin (swelling, dyspnea)


Family history: son  at age 45, history of Wiskott-Darryn syndrome


Social history: light smoker 40+ years ago; rare alcohol drinker; denies drug 

use; lives with daughter


PMD: Dr. Crawford








On Admission: 


Pt is a 73yo female with a PMH of HLD, HTN, ESRD secondary to HTN status post 

renal trasnplant in . Pt was hospitalized May 2018 for a seizure where she 

had 2 neg CTs and was instructed to follow up with neurology, with which she 

did not comply. Pt reports feeling weak and unbalanced this morning and had to 

be helped to the bathroom by her daughter. Afterwards, she was assisted to sit 

down in a chair where she sustained a 5 minute seizure according to the 

daughter. Daughter describes the seizure as constant shaking with tongue biting 

and cyanosis. She remained sitting down post-ictal for another 15 minutes. She 

was immediately brought to [facility] ED by ambulance. Pt does not recall the 

seizure but can remember events before and after. Pt denies loss of bowel 

control during her episode.  Pt reports bilateral shoulder pain. She denies any 

chest pain, N/V, SOB, dizziness or lightheadedness. Denies loss of vision. 





Hospital Course:


Patient was admitted for recurrent seizure. Patient had Head CT which showed no 

abnormalities. Patient was to get MRI in hospital however patient had implanted 

stimulator in sacral area. Patient is to get outpatient MRI after followup with 

neurologist.








Patient is stable for discharge to home as per Dr. Perez. Patient is to followup 

with PMD Dr. Crawford within 1 week of discharge for post hospital care. 

Patient is to followup with Dr. Arora (neurology) to setup outpatient MRI for 

head. Patient to call and make an appointment. Patient is to resume all of her 

home medications and in addition take the following new medication: Keppra 

750mg by mouth twice day. Patient is to return to the ED if symptoms return. 

All instructions explained to patient and her daughter who agree with the plan 

above.








*This is a summary of hospital course. Please refer to EMR for full admission 

details.  





Discharge Exam





- Head Exam


Head Exam: ATRAUMATIC, NORMAL INSPECTION





- Eye Exam


Eye Exam: EOMI, Normal appearance.  absent: Nystagmus, Scleral icterus





- ENT Exam


ENT Exam: Mucous Membranes Moist, Normal Exam





- Respiratory Exam


Respiratory Exam: NORMAL BREATHING PATTERN.  absent: Wheezes, Respiratory 

Distress





- Cardiovascular Exam


Cardiovascular Exam: REGULAR RHYTHM, +S1, +S2.  absent: Systolic Murmur





- GI/Abdominal Exam


GI & Abdominal Exam: Normal Bowel Sounds, Soft.  absent: Distended, Firm, 

Tenderness





- Extremities Exam


Extremities exam: normal inspection





- Back Exam


Back exam: NORMAL INSPECTION.  absent: CVA tenderness (L), CVA tenderness (R)





- Neurological Exam


Neurological exam: Alert, CN II-XII Intact, Oriented x3





- Psychiatric Exam


Psychiatric exam: Normal Affect, Normal Mood





- Skin


Skin Exam: Normal Color, Warm





Discharge Plan





- Discharge Medications


Prescriptions: 


levETIRAcetam [Keppra] 750 mg PO BID #30 tab





- Follow Up Plan


Condition: IMPROVED


Disposition: HOME/ ROUTINE


Instructions:  Heart Healthy Diet, Seizures, Adult (DC), Kidney Disease Diet (

For People Not on Dialysis), Levetiracetam


Additional Instructions: 


Patient is stable for discharge to home as per Dr. Perez. Patient is to followup 

with PMD Dr. Crawford within 1 week of discharge for post hospital care. 

Patient is to followup with Dr. Arora (neurology) to setup outpatient MRI for 

head. Patient to call and make an appointment. Patient is to resume all of her 

home medications and in addition take the following new medication: Keppra 

750mg by mouth twice day. Patient is to return to the ED if symptoms return. 

All instructions explained to patient and her daughter who agree with the plan 

above.  


Referrals: 


Agapito Arora MD [Staff Provider] - 


Edie rCawford MD [Staff Provider] -

## 2018-08-01 NOTE — PN
Copied To:  Agapito Arora MD

Attending MD:  Agapito Arora MD



DATE:  08/01/2018



TIME OF EVALUATION:  07:10 a.m.



NEUROLOGICAL PROBLEM:  Recurrent seizures.



PHYSICAL EXAMINATION:

VITAL SIGNS:  Blood pressure 143/71, mean arterial pressure of 95,

respiratory rate 18, temperature afebrile.

NECK:  Supple.  No carotid bruits.

HEART:  Sounds regular.

CHEST:  Fair entry.

EXTREMITIES:  No edema in legs.



NEUROLOGICAL STATUS:  The patient is arousable on calling her name, moves

all four extremities as usual.  Her current examination, which is unchanged

compared with her previous examination.  During the hospitalization, no

seizures have been documented or witnessed.



The patient is tolerating Keppra 750 mg twice a day.



Her electroencephalogram being reviewed consistent with bilateral slow

activities without any paroxysmal activities.



We recommended MRI of the brain, is also pending because of her implant. 

Her information on implant being reviewed, which will be consulted with MRI

department if possible.  The patient will be getting MRI of the brain. 

Otherwise, if MRI is not possible, the patient can be discharged if

medically stable.







__________________________________________

Agapito Arora MD



DD:  08/01/2018 7:32:01

DT:  08/01/2018 7:32:53

Job # [id number]

## 2018-08-01 NOTE — CP.PCM.PN
Subjective





- Date & Time of Evaluation


Date of Evaluation: 08/01/18


Time of Evaluation: 13:24





- Subjective


Subjective: 





alert


no more seizures


renal function, HTN stable


no new complaint





Objective





- Vital Signs/Intake and Output


Vital Signs (last 24 hours): 


 











Temp Pulse Resp BP Pulse Ox


 


 98.7 F   66   20   132/74   96 


 


 08/01/18 07:00  08/01/18 08:02  08/01/18 07:00  08/01/18 07:00  08/01/18 07:00








Intake and Output: 


 











 08/01/18 08/01/18





 06:59 18:59


 


Intake Total 360 


 


Balance 360 














- Medications


Medications: 


 Current Medications





Acetaminophen (Tylenol 325mg Tab)  650 mg PO Q6 PRN


   PRN Reason: Pain, moderate (4-7)


   Last Admin: 07/30/18 18:15 Dose:  650 mg


Amlodipine Besylate (Norvasc)  5 mg PO DAILY [facility]


   Last Admin: 08/01/18 10:26 Dose:  5 mg


Cyclosporine (Cyclosporine)  75 mg PO DAILY@0800 [facility]


   Last Admin: 08/01/18 08:20 Dose:  75 mg


Cyclosporine (Cyclosporine)  50 mg PO DAILY@2000 [facility]


   Last Admin: 07/31/18 19:38 Dose:  50 mg


Docusate Sodium (Colace)  100 mg PO BID [facility]


   Last Admin: 08/01/18 10:26 Dose:  100 mg


Heparin Sodium (Porcine) (Heparin)  5,000 units SC Q12 [facility]


   Last Admin: 07/31/18 21:29 Dose:  Not Given


Hydrochlorothiazide (Microzide)  12.5 mg PO Q72 [facility]


Levetiracetam (Keppra)  750 mg PO BID [facility]


   Last Admin: 08/01/18 10:26 Dose:  750 mg


Metoprolol Succinate (Toprol Xl)  50 mg PO BID [facility]


   Last Admin: 08/01/18 10:26 Dose:  50 mg


Mycophenolate Mofetil (Cellcept)  500 mg PO BID [facility]


   Last Admin: 08/01/18 10:27 Dose:  500 mg


Omega-3-Acid Ethyl Esters (Lovaza)  0.001 gm PO BID [facility]


   Last Admin: 08/01/18 10:26 Dose:  0.001 gm


Prednisone (Prednisone Tab)  5 mg PO DAILY [facility]


   Last Admin: 08/01/18 10:26 Dose:  5 mg


Tramadol HCl (Ultram)  50 mg PO BID PRN


   PRN Reason: Pain











- Labs


Labs: 


 





 08/01/18 06:46 





 08/01/18 06:46 











- Constitutional


Appears: No Acute Distress, Chronically Ill





- Head Exam


Head Exam: ATRAUMATIC, NORMAL INSPECTION





- Eye Exam


Eye Exam: EOMI, Normal appearance





- Neck Exam


Neck Exam: Normal Inspection.  absent: Tenderness





- Respiratory Exam


Respiratory Exam: Clear to Ausculation Bilateral, NORMAL BREATHING PATTERN





- Cardiovascular Exam


Cardiovascular Exam: REGULAR RHYTHM, +S1





- GI/Abdominal Exam


GI & Abdominal Exam: Soft.  absent: Tenderness





- Extremities Exam


Extremities Exam: Normal Inspection.  absent: Tenderness





- Neurological Exam


Neurological Exam: Alert, CN II-XII Intact





- Skin


Skin Exam: Dry, Warm





Assessment and Plan


(1) History of kidney transplant


Status: Acute   





(2) Seizure


Status: Acute   





(3) CKD (chronic kidney disease) stage 3, GFR 30-59 ml/min


Status: Acute   





(4) Syncope and collapse


Status: Acute   





- Assessment and Plan (Free Text)


Plan: 





seizure meds as per neuro


check CSA level


check iron stores/ chemistries

## 2019-01-10 NOTE — RAD
Date of service: 



01/10/2019



PROCEDURE:  Radiographs of the chest and abdomen (obstructive series)



HISTORY:

 abd pain, left abdomen 



COMPARISON:

No prior.



TECHNIQUE:

AP radiograph of the chest, with upright and supine radiographs of 

the abdomen.



FINDINGS:



CHEST:

Lungs: Clear.



Cardiovascular: Normal size heart. No pulmonary vascular congestion. 



 No aortic atherosclerotic calcification present



Pleura: No pleural fluid. No pneumothorax.



Other findings: Spinal stimulator seen overlying the midthoracic 

spine.



ABDOMEN AND PELVIS:

Bowel: Multiple dilated small bowel loops.  Air-fluid levels are seen 

at differential heights on the upright view.  Suspicious for 

mechanical bowel obstruction.  Correlate clinically.



Free air: None.



Bones:  Status post lumbar fixation at multiple levels with pedicle 

screws and vertical rods.



Other findings: None.



IMPRESSION:

Possible mechanical bowel obstruction.  Follow-up advised.  No 

pulmonary infiltrate.  No free air.

## 2019-01-10 NOTE — C.PDOC
History Of Present Illness


73 year old female presents to the ER with a complaint of LUQ pain since 

yesterday morning after eating breakfast. Patient was relieved briefly with 

bowel movement but pain recurred. As per relative, patient appeared restless and

distended tonight. Denies nausea, vomiting, URI, UTI, constipation, or bloody 

stools. 


Time Seen by Provider: 01/10/19 03:33


Chief Complaint (Nursing): Abdominal Pain


History Per: Patient


History/Exam Limitations: no limitations


Onset/Duration Of Symptoms: Days


Current Symptoms Are (Timing): Still Present


Location Of Pain/Discomfort: LUQ


Radiation Of Pain To:: None


Quality Of Discomfort: Unable To Describe


Associated Symptoms: denies: Nausea, Vomiting, Constipation, Urinary Symptoms, 

Other (URI, bloody stool)


Exacerbating Factors: None


Alleviating Factors: None


Recent travel outside of the [place]: No





Past Medical History


Reviewed: Historical Data, Nursing Documentation, Vital Signs


Vital Signs: 





                                Last Vital Signs











Temp  98.1 F   01/10/19 03:00


 


Pulse  61   01/10/19 03:00


 


Resp  20   01/10/19 03:00


 


BP  186/75 H  01/10/19 03:00


 


Pulse Ox  97   01/10/19 03:00














- Medical History


PMH: HTN, Hypercholesterolemia, Chronic Kidney Disease, Seizures (May 2018)





- [facility] Procedures











MEASURE OF ARTERIAL PRESSURE, PERIPHERAL, EXTERN APPROACH (10/07/17)


MEASUREMENT OF CARDIAC RHYTHM, EXTERNAL APPROACH (10/07/17)








Family History: States: Unknown Family Hx





- Social History


Hx Alcohol Use: No


Hx Substance Use: No





- Immunization History


Hx Tetanus Toxoid Vaccination: Yes


Hx Influenza Vaccination: No


Hx Pneumococcal Vaccination: Yes (also recvd TB)





Review Of Systems


Constitutional: Negative for: Fever, Chills


ENT: Negative for: Nose Congestion


Respiratory: Negative for: Cough


Gastrointestinal: Positive for: Abdominal Pain.  Negative for: Nausea, Vomiting


Genitourinary: Negative for: Dysuria, Hematuria





Physical Exam





- Physical Exam


Appears: Non-toxic


Skin: Normal Color, Warm, Dry


Head: Atraumatic, Normacephalic


Eye(s): bilateral: Normal Inspection


Oral Mucosa: Moist


Chest: Symmetrical, No Tenderness


Cardiovascular: Rhythm Regular


Respiratory: Normal Breath Sounds, No Rales, No Rhonchi, No Wheezing


Gastrointestinal/Abdominal: Bowel Sounds (Normal), Soft, Tenderness (LUQ), 

Distention, No Guarding, Rebound (LUQ)


Back: No CVA Tenderness


Neurological/Psych: Oriented x3, Normal Speech





ED Course And Treatment





- Laboratory Results


Result Diagrams: 


                                 01/10/19 04:02





                                 01/10/19 04:02


Lab Results: 





                                        











Total Bilirubin  0.5 mg/dL (0.2-1.3)   01/10/19  04:02    


 


AST  24 U/L (14-36)   01/10/19  04:02    


 


ALT  29 U/L (9-52)   01/10/19  04:02    


 


Alkaline Phosphatase  56 U/L ()   01/10/19  04:02    


 


Total Protein  6.7 g/dL (6.3-8.3)   01/10/19  04:02    


 


Albumin  4.2 g/dL (3.5-5.0)   01/10/19  04:02    


 


Globulin  2.5 gm/dL (2.2-3.9)   01/10/19  04:02    


 


Albumin/Globulin Ratio  1.6  (1.0-2.1)   01/10/19  04:02    








                                        











Lipase  25 U/L ()   01/10/19  04:02    











O2 Sat by Pulse Oximetry: 97 (Room air)


Pulse Ox Interpretation: Normal





- Other Rad


  ** Obstructive Series


X-Ray: Interpreted by Me, Viewed By Me


Interpretation: nonspecific, ? SBO


Progress Note: Blood work, obstructive series ordered, results showed possible 

small bowel obstruction, pending CT abd/pelvis. Morphine administered. On 

reevaluation, patient reports improvement of pain, she is resting comfortably in

the ER in no acute distress, abdomen remains soft, nontender, vitals are stable.

Patient and relative educated about CT results, advised to increased fiber in 

diet, and to follow up with PMD or return if symptoms worsen.


Reevaluation Time: 06:12


Reassessment Condition: Improved





Disposition





- Disposition


Disposition: HOME/ ROUTINE


Disposition Time: 06:08


Condition: STABLE


Additional Instructions: 


Please follow up with PMD tomorrow





Take fiber/ stool softeners





Have small meals at a time





Return to ER if recurring severe pain, vomiting , fever or worse 


Instructions:  Acute Abdomen (Belly Pain), Adult (DC)


Forms:  [facility] (English)





- Clinical Impression


Clinical Impression: 


 Abdominal pain, Ileus, unspecified, Gastroparesis








- PA / NP / Resident Statement


MD/DO has reviewed & agrees with the documentation as recorded.





- Scribe Statement


The provider has reviewed the documentation as recorded by the Scribe





Sergio Francis





All medical record entries made by the Ruiibember were at my direction and 

personally dictated by me. I have reviewed the chart and agree that the record 

accurately reflects my personal performance of the history, physical exam, 

medical decision making, and the department course for this patient. I have also

personally directed, reviewed, and agree with the discharge instructions and 

disposition.

## 2019-04-09 ENCOUNTER — HOSPITAL ENCOUNTER (OUTPATIENT)
Dept: HOSPITAL 31 - C.LAB | Age: 74
End: 2019-04-09
Payer: MEDICARE

## 2019-04-09 DIAGNOSIS — N39.0: Primary | ICD-10-CM

## 2019-04-18 ENCOUNTER — HOSPITAL ENCOUNTER (OUTPATIENT)
Dept: HOSPITAL 31 - C.LAB | Age: 74
End: 2019-04-18
Payer: MEDICARE

## 2019-04-18 DIAGNOSIS — Z48.22: Primary | ICD-10-CM

## 2019-04-26 ENCOUNTER — HOSPITAL ENCOUNTER (OUTPATIENT)
Dept: HOSPITAL 31 - C.LAB | Age: 74
End: 2019-04-26
Payer: MEDICARE

## 2019-04-26 DIAGNOSIS — R50.9: ICD-10-CM

## 2019-04-26 DIAGNOSIS — Z48.22: Primary | ICD-10-CM

## 2019-12-11 NOTE — CP.PCM.CON
History of Present Illness





- History of Present Illness


History of Present Illness: 





CONSULT DICTATED 


RECURRENT SEIZURES 


TO BE ON AED AND DOSE TO BE ON THERAPEUTIC DOSE 


AGREE  KEPPRA   MG BID


MRI/EEG 


SEIZURE PRECAUTION 


IF STABLE  24 HRS  D/C AND FOLLOW UP OP 





Past Patient History





- Infectious Disease


Hx of Infectious Diseases: None





- Tetanus Immunizations


Tetanus Immunization: Unknown





- Past Medical History & Family History


Past Medical History?: Yes





- Past Social History


Smoking Status: Former Smoker


Alcohol: None


Drugs: Denies


Home Situation {Lives}: With Family





- CARDIAC


Hx Hypercholesterolemia: Yes


Hx Hypertension: Yes





- PULMONARY


Hx Respiratory Disorders: No





- NEUROLOGICAL


Hx Seizures: Yes (May 2018)





- HEENT


Hx Cataracts: Yes





- RENAL


Hx Chronic Kidney Disease: Yes


Other/Comment: Renal Transplant





- ENDOCRINE/METABOLIC


Hx Endocrine Disorders: No





- MUSCULOSKELETAL/RHEUMATOLOGICAL


Hx Falls: Yes





- GASTROINTESTINAL


Hx Gastrointestinal Disorders: No





- PSYCHIATRIC


Hx Substance Use: No





- SURGICAL HISTORY


Hx Surgeries: Yes


Other/Comment: Rt. kidney transplant Aug. 3,2003.  L AVF





- ANESTHESIA


Hx Anesthesia: Yes


Hx Anesthesia Reactions: No


Hx Malignant Hyperthermia: No





Meds


Allergies/Adverse Reactions: 


 Allergies











Allergy/AdvReac Type Severity Reaction Status Date / Time


 


amoxicillin AdvReac Severe  Verified 07/30/18 10:46














- Medications


Medications: 


 Current Medications





Acetaminophen (Tylenol 325mg Tab)  650 mg PO Q6 PRN


   PRN Reason: Pain, moderate (4-7)


   Last Admin: 07/30/18 18:15 Dose:  650 mg


Amlodipine Besylate (Norvasc)  5 mg PO DAILY [facility]


Docusate Sodium (Colace)  100 mg PO BID [facility]


   Last Admin: 07/30/18 18:40 Dose:  100 mg


Heparin Sodium (Porcine) (Heparin)  5,000 units SC Q12 [facility]


   Last Admin: 07/30/18 22:41 Dose:  5,000 units


Hydrochlorothiazide (Microzide)  12.5 mg PO Q72 [facility]


Levetiracetam (Keppra)  750 mg PO BID [facility]


Metoprolol Succinate (Toprol Xl)  50 mg PO BID [facility]


   Last Admin: 07/30/18 18:40 Dose:  50 mg


Mycophenolate Mofetil (Cellcept)  500 mg PO BID [facility]


   Last Admin: 07/30/18 18:40 Dose:  500 mg


Omega-3-Acid Ethyl Esters (Lovaza)  0.001 gm PO BID [facility]


   Last Admin: 07/30/18 18:40 Dose:  0.001 gm


Prednisone (Prednisone Tab)  5 mg PO DAILY [facility]


Tramadol HCl (Ultram)  50 mg PO BID PRN


   PRN Reason: Pain


Trimethoprim/Sulfamethoxazole (Bactrim Ss Tab)  1 tab PO DAILY [facility]


   PRN Reason: Protocol











Results





- Vital Signs


Recent Vital Signs: 


 Last Vital Signs











Temp  98.6 F   07/31/18 05:12


 


Pulse  64   07/31/18 05:12


 


Resp  20   07/31/18 05:12


 


BP  149/71   07/31/18 05:12


 


Pulse Ox  96   07/31/18 00:39














- Labs


Result Diagrams: 


 07/30/18 12:30





 07/30/18 12:46


Labs: 


 Laboratory Results - last 24 hr











  07/30/18 07/30/18 07/30/18





  12:30 12:30 12:30


 


WBC  9.2  


 


RBC  3.96  


 


Hgb  10.0 L  


 


Hct  30.6 L  


 


MCV  77.3 L  


 


MCH  25.2 L  


 


MCHC  32.6 L  


 


RDW  15.9 H  


 


Plt Count  211  


 


MPV  8.0  


 


Neut % (Auto)  71.9  


 


Lymph % (Auto)  16.8 L  


 


Mono % (Auto)  10.3 H  


 


Eos % (Auto)  0.5  


 


Baso % (Auto)  0.5  


 


Neut # (Auto)  6.6  


 


Lymph # (Auto)  1.6  


 


Mono # (Auto)  1.0 H  


 


Eos # (Auto)  0.0  


 


Baso # (Auto)  0.0  


 


Sodium   


 


Potassium   


 


Chloride   


 


Carbon Dioxide   


 


Anion Gap   


 


BUN   


 


Creatinine   


 


Est GFR ( Amer)   


 


Est GFR (Non-Af Amer)   


 


POC Glucose (mg/dL)   


 


Random Glucose   


 


Calcium   


 


Phosphorus   


 


Magnesium   


 


Total Bilirubin   


 


AST   


 


ALT   


 


Alkaline Phosphatase   


 


Total Creatine Kinase   


 


Troponin I   


 


Total Protein   


 


Albumin   


 


Globulin   


 


Albumin/Globulin Ratio   


 


Prolactin   


 


Urine Color   Yellow 


 


Urine Clarity   Clear 


 


Urine pH   5.0 


 


Ur Specific Thornton   1.011 


 


Urine Protein   1+ H 


 


Urine Glucose (UA)   Normal 


 


Urine Ketones   Negative 


 


Urine Blood   Negative 


 


Urine Nitrate   Negative 


 


Urine Bilirubin   Negative 


 


Urine Urobilinogen   Normal 


 


Ur Leukocyte Esterase   Neg 


 


Urine WBC (Auto)   1 


 


Urine RBC (Auto)   < 1 


 


Ur Squamous Epith Cells   < 1 


 


Urine Opiates Screen    Negative


 


Urine Methadone Screen    Negative


 


Ur Barbiturates Screen    Negative


 


Ur Phencyclidine Scrn    Negative


 


Ur Amphetamines Screen    Negative


 


U Benzodiazepines Scrn    Negative


 


U Oth Cocaine Metabols    Negative


 


U Cannabinoids Screen    Negative














  07/30/18 07/30/18 07/30/18





  12:46 12:46 18:39


 


WBC   


 


RBC   


 


Hgb   


 


Hct   


 


MCV   


 


MCH   


 


MCHC   


 


RDW   


 


Plt Count   


 


MPV   


 


Neut % (Auto)   


 


Lymph % (Auto)   


 


Mono % (Auto)   


 


Eos % (Auto)   


 


Baso % (Auto)   


 


Neut # (Auto)   


 


Lymph # (Auto)   


 


Mono # (Auto)   


 


Eos # (Auto)   


 


Baso # (Auto)   


 


Sodium  139  


 


Potassium  3.6  


 


Chloride  104  


 


Carbon Dioxide  25  


 


Anion Gap  13  


 


BUN  26 H  


 


Creatinine  1.4 H  


 


Est GFR ( Amer)  44  


 


Est GFR (Non-Af Amer)  37  


 


POC Glucose (mg/dL)   93 


 


Random Glucose  91  


 


Calcium  8.8  


 


Phosphorus   


 


Magnesium   


 


Total Bilirubin  0.4  


 


AST  28  


 


ALT  32  


 


Alkaline Phosphatase  57  


 


Total Creatine Kinase  323 H  


 


Troponin I    0.0450


 


Total Protein  5.7 L  


 


Albumin  3.5  


 


Globulin  2.2  


 


Albumin/Globulin Ratio  1.6  


 


Prolactin   


 


Urine Color   


 


Urine Clarity   


 


Urine pH   


 


Ur Specific Gravity   


 


Urine Protein   


 


Urine Glucose (UA)   


 


Urine Ketones   


 


Urine Blood   


 


Urine Nitrate   


 


Urine Bilirubin   


 


Urine Urobilinogen   


 


Ur Leukocyte Esterase   


 


Urine WBC (Auto)   


 


Urine RBC (Auto)   


 


Ur Squamous Epith Cells   


 


Urine Opiates Screen   


 


Urine Methadone Screen   


 


Ur Barbiturates Screen   


 


Ur Phencyclidine Scrn   


 


Ur Amphetamines Screen   


 


U Benzodiazepines Scrn   


 


U Oth Cocaine Metabols   


 


U Cannabinoids Screen   














  07/30/18





  18:39


 


WBC 


 


RBC 


 


Hgb 


 


Hct 


 


MCV 


 


MCH 


 


MCHC 


 


RDW 


 


Plt Count 


 


MPV 


 


Neut % (Auto) 


 


Lymph % (Auto) 


 


Mono % (Auto) 


 


Eos % (Auto) 


 


Baso % (Auto) 


 


Neut # (Auto) 


 


Lymph # (Auto) 


 


Mono # (Auto) 


 


Eos # (Auto) 


 


Baso # (Auto) 


 


Sodium 


 


Potassium 


 


Chloride 


 


Carbon Dioxide 


 


Anion Gap 


 


BUN 


 


Creatinine 


 


Est GFR ( Amer) 


 


Est GFR (Non-Af Amer) 


 


POC Glucose (mg/dL) 


 


Random Glucose 


 


Calcium 


 


Phosphorus  3.3


 


Magnesium  2.0


 


Total Bilirubin 


 


AST 


 


ALT 


 


Alkaline Phosphatase 


 


Total Creatine Kinase 


 


Troponin I 


 


Total Protein 


 


Albumin 


 


Globulin 


 


Albumin/Globulin Ratio 


 


Prolactin  13.2


 


Urine Color 


 


Urine Clarity 


 


Urine pH 


 


Ur Specific Gravity 


 


Urine Protein 


 


Urine Glucose (UA) 


 


Urine Ketones 


 


Urine Blood 


 


Urine Nitrate 


 


Urine Bilirubin 


 


Urine Urobilinogen 


 


Ur Leukocyte Esterase 


 


Urine WBC (Auto) 


 


Urine RBC (Auto) 


 


Ur Squamous Epith Cells 


 


Urine Opiates Screen 


 


Urine Methadone Screen 


 


Ur Barbiturates Screen 


 


Ur Phencyclidine Scrn 


 


Ur Amphetamines Screen 


 


U Benzodiazepines Scrn 


 


U Oth Cocaine Metabols 


 


U Cannabinoids Screen Patient : Aida Hernandez Age: 72 year old Sex: female   MRN: [id number] Encounter Date: 12/11/2019      History   CHIEF COMPLAINT    Chief Complaint   Patient presents with   • Hypertension   • Visual Changes       The patient's PCP is No Pcp    HPI    11:27 AM  Aida Hernandez is a 72 year old female with a PMHx of HTN who presents to the ED today from her ophthalmologist's office to be evaluated for HTN noticed today. Pt reports that she woke up one week ago with blurred vision to L eye. States that she has yearly check up in January, but moved appointment up due to blurred vision. Pt was in ophthalmology office today, blood pressure was measured at 220/110. Pt reports that she has hx of HTN, states that many years ago she was taking half a tab of HCTZ. Pt stopped taking this many years ago and has not been back to PCP since her PCP retired. Denies eye pain, eye redness, eye discharge, fever, chills, CP, palpitations, SOB, cough, abd pain, N/V/D, urinary sx, HA, weakness, numbness, tingling, any other complaints at this time.    ALLERGIES:  No Known Allergies    Prior to Admission Medications    No medications on file       New Prescriptions    No medications on file       History reviewed. No pertinent past medical history.    Past Surgical History:   Procedure Laterality Date   • Cataract extraction, bilateral         No family history on file.    Social History     Tobacco Use   • Smoking status: Former Smoker     Packs/day: 0.00   • Smokeless tobacco: Never Used   Substance Use Topics   • Alcohol use: Yes     Comment: socially   • Drug use: Never       Constitutional:  Denies fever or chills.   Eyes:  Denies eye pain or discharge. Complains of blurred vision  HENT:  Denies nasal congestion, sore throat, ear pain.  Respiratory:  Denies cough or shortness of breath.   Cardiovascular:  Denies chest pain, palpitations or edema. Complains of HTN  GI:  Denies abdominal pain, nausea, vomiting, bloody stools or diarrhea  .  :  Denies dysuria, hematuria  Musculoskeletal:  Denies back pain or joint pain.   Integument:  Denies rash.   Neurologic:  Denies headache, focal weakness or sensory changes.   Endocrine:  Denies polyuria or polydipsia.   Lymphatic:  Denies swollen glands.   Psychiatric:  Denies depression or anxiety.     Physical Exam     Vitals:    12/11/19 1800 12/11/19 1830 12/11/19 1900 12/11/19 1931   BP: 143/65 162/63 139/61 177/66   Pulse: 66 67 65 69   Resp: 11 12 10 11   Temp:       TempSrc:       SpO2: 95% (!) 89% 94% 97%   Weight:       Height:           Physical Exam   Constitutional: She is oriented to person, place, and time. She appears well-developed and well-nourished. She is active. No distress.   HENT:   Head: Normocephalic and atraumatic.   Eyes: Pupils are equal, round, and reactive to light. Conjunctivae and EOM are normal. Right eye exhibits no discharge. Left eye exhibits no discharge.   Bilateral pupils dilated secondary to opthalmology visit    Neck: Normal range of motion. Neck supple.   Cardiovascular: Normal rate, regular rhythm, normal heart sounds and intact distal pulses.   No murmur heard.  Pulmonary/Chest: Effort normal and breath sounds normal. No respiratory distress. She has no wheezes.   Abdominal: Soft. Bowel sounds are normal. She exhibits no distension. There is no tenderness. Musculoskeletal: Normal range of motion.         General: No tenderness.     Neurological: She is alert and oriented to person, place, and time. She has normal strength. No cranial nerve deficit or sensory deficit. She exhibits normal muscle tone. Coordination normal.   Finger-to-nose, heel-to-shin, rapid alternating movements were normal   Skin: Skin is warm, dry and intact.   Psychiatric: She has a normal mood and affect. Her behavior is normal.   Nursing note and vitals reviewed.      EKG Interpretation  1159  Rate: 74  Rhythm: sinus arrhythmia   Abnormality: yes  Q waves V1-V3    RADIOLOGY    CT HEAD WO  CONTRAST   Final Result   1.  Right occipital hypodensities as detailed above.  Findings could reflect subacute on chronic ischemic changes.  Neoplastic is not possible to exclude.  Further evaluation by MRI advised.   2.  Small old left posterior parietal infarct and white matter changes.  No acute intracranial hemorrhage, mass effect or midline shift noted.      Electronically Signed by: WALLACE ROBLERO M.D.    Signed on: 12/11/2019 1:46 PM          MRI BRAIN W WO CONTRAST    (Results Pending)   MRA HEAD WO CONTRAST    (Results Pending)   MRA NECK W WO CONTRAST    (Results Pending)         LABS    Results for orders placed or performed during the hospital encounter of 12/11/19   CBC with Automated Differential   Result Value    WBC 6.3    RBC 4.59    HGB 8.2 (L)    HCT 30.6 (L)    MCV 66.7 (L)    MCH 17.9 (L)    MCHC 26.8 (L)    RDW-CV 19.1 (H)        NRBC 0    DIFF TYPE AUTOMATED DIFFERENTIAL    Neutrophil 76    LYMPH 16    MONO 6    EOSIN 1    BASO 1    Percent Immature Granuloctyes 0    Absolute Neutrophil 4.8    Absolute Lymph 1.0    Absolute Mono 0.4    Absolute Eos 0.0 (L)    Absolute Baso 0.0    Absolute Immature Granulocytes 0.0   COMPREHENSIVE METABOLIC PANEL   Result Value    Sodium 136    Potassium 3.2 (L)    Chloride 103    Carbon Dioxide 25    Anion Gap 11    Glucose 199 (H)    BUN 7    Creatinine 0.55    GFR Estimate,  >90     Comment: eGFR results = or >90 mL/min/1.73m2 = Normal kidney function.    GFR Estimate, Non  >90     Comment: eGFR results = or >90 mL/min/1.73m2 = Normal kidney function.    BUN/Creatinine Ratio 13    CALCIUM 8.8    TOTAL BILIRUBIN 0.7    AST/SGOT 11    ALT/SGPT 18    ALK PHOSPHATASE 127 (H)    TOTAL PROTEIN 8.9 (H)    Albumin 4.5    GLOBULIN 4.4 (H)    A/G Ratio, Serum 1.0   Prothrombin Time   Result Value    PROTIME 10.3    INR 1.0     Comment: INR Therapeutic Range: 2.0 to 3.0 (2.5 to 3.5 recommended for recurrent thrombotic episodes and  mechanical prosthetic heart valves.)    Partial Thromboplastin Time   Result Value    PTT 24     Comment: PTT  Therapeutic Range:  45-70 seconds.   Troponin I Ultra Sensitive   Result Value    TROPONIN I <0.02   URINALYSIS WITH MICRO & CULTURE IF INDICATED   Result Value    SPECIMEN TYPE URINE, CLEAN CATCH/MIDSTREAM     Comment: CORRECTED ON 12/11 AT 1154: PREVIOUSLY REPORTED AS URINE CLEAN CATCH    COLOR STRAW (A)    APPEARANCE CLEAR    GLUCOSE(URINE) TRACE (A)    BILIRUBIN NEGATIVE    KETONES NEGATIVE    SPECIFIC GRAVITY <1.005 (L)    BLOOD MODERATE (A)    pH 7.0    PROTEIN(URINE) NEGATIVE    UROBILINOGEN 0.2    NITRITE NEGATIVE    LEUKOCYTE ESTERASE TRACE (A)     Comment: Culture indicated, results to follow.    Squamous EPI'S 1 to 5    RBC 1 to 2    WBC 1 to 5    BACTERIA NONE SEEN    Hyaline Casts NONE SEEN   Troponin I Ultra Sensitive   Result Value    TROPONIN I 0.02   ECG   Result Value    Ventricular Rate EKG/Min (BPM) 99    Atrial Rate (BPM) 99    NM-Interval (MSEC) 176    QRS-Interval (MSEC) 94    QT-Interval (MSEC) 400    QTc 513    P Axis (Degrees) 75    R Axis (Degrees) 57    T Axis (Degrees) 57    REPORT TEXT      Normal sinus rhythm  Left ventricular hypertrophy  with repolarization abnormality  Cannot rule out  Septal infarct  , age undetermined  Prolonged QT  Abnormal ECG  No previous ECGs available  Confirmed by OZ GARRISON MD [id number]) on 12/11/2019 4:51:30 PM     Electrocardiogram 12-Lead   Result Value    Ventricular Rate EKG/Min (BPM) 74    Atrial Rate (BPM) 74    NM-Interval (MSEC) 180    QRS-Interval (MSEC) 92    QT-Interval (MSEC) 440    QTc 488    P Axis (Degrees) -2    R Axis (Degrees) -32    T Axis (Degrees) 32    REPORT TEXT      Normal sinus rhythm  with sinus arrhythmia  Left axis deviation  Moderate voltage criteria for LVH, may be normal variant  Cannot rule out  Septal infarct  (cited on or before  11-DEC-2019)  Abnormal ECG  When compared with ECG of  11-DEC-2019 10:58,  QRS  axis  shifted left  Nonspecific T wave abnormality no longer evident in  Lateral leads  Confirmed by OZ GARRISON MD ([id number]) on 12/11/2019 4:51:40 PM         MEDS  Medications   aspirin (ECOTRIN) enteric coated tablet 81 mg (has no administration in time range)   atorvastatin (LIPITOR) tablet 40 mg (has no administration in time range)   labetalol (NORMODYNE) injection 20 mg (20 mg Intravenous Given 12/11/19 1153)   amLODIPine (NORVASC) tablet 5 mg (5 mg Oral Given 12/11/19 1417)   aspirin chewable 324 mg (324 mg Oral Given 12/11/19 1559)   carvedilol (COREG) tablet 12.5 mg (12.5 mg Oral Given 12/11/19 1623)   potassium CHLORIDE (KLOR-CON M) joel ER tablet 20 mEq (20 mEq Oral Given 12/11/19 1943)       PROCEDURES    Critical Care  Performed by: Tami Rodriguez PA-C  Authorized by: Tami Rodriguez PA-C     Critical care provider statement:     Critical care time (minutes):  33    Critical care start time:  12/11/2019 11:43 AM    Critical care time was exclusive of:  Separately billable procedures and treating other patients    Critical care was necessary to treat or prevent imminent or life-threatening deterioration of the following conditions:  CNS failure or compromise and circulatory failure    Critical care was time spent personally by me on the following activities:  Blood draw for specimens, development of treatment plan with patient or surrogate, discussions with consultants, discussions with primary provider, evaluation of patient's response to treatment, examination of patient, interpretation of cardiac output measurements, obtaining history from patient or surrogate, ordering and performing treatments and interventions, ordering and review of laboratory studies, ordering and review of radiographic studies and re-evaluation of patient's condition    I assumed direction of critical care for this patient from another provider in my specialty: no          MDM  72 year old female presenting to ED for  evaluation of blurred vision to L eye x 1 week. Pt was seen in ophthamologist's office prior to arrival, had dilated eye exam completed. Pt's blood pressure in office was 220/110. Pt denies eye pain, eye redness/discharge, weakness, numbness, tingling, CP, SOB, speech changes. Vitals significant for HTN, blood pressure was 258/108 on arrival. PEx as stated above. Visual testing limited secondary to pupil dilation. CBC, CMP unremarkable. EKG, serial cardiac enzymes negative for acute abnormalities. CT head shows R occipital hypodensities significant for possible subacute ischemia. Neoplasm no excluded. Pt given labetalol 20 mg IV, amlodipine 5 mg, coreg 12.5 with improvement of BP. Pt admitted for further evaluation and BP control. Neurology on consult.     ED Course   1510: Discussed case with Dr. Reddy, agrees with inpatient admission. Recommends neurology consult and discuss with neurology as to which floor is appropriate for patient.   1524: Discussed case with Dr. Stroud, agrees to act on consult. Does not require ICU placement at this time. Agrees with administration of aspirin.   1545: Discussed case with Dr. Foss, intensivist. Does not recommend ICU placement at this time. Recommends adding coreg to oral medications and use of hydralazine PRN.   1603: Pt is resting comfortably and feeling improved. Discussed results of CT head and concern for CVA. Discussed need for further imaging including MRI and need for neurology consult. Pt understands and agrees to plan. Reviewed diagnostic results with patient as well as plans to stay. Pt understands and agrees to stay for further work up and management.    Impression:  The primary encounter diagnosis was Hypertensive emergency. A diagnosis of Cerebrovascular accident (CVA), unspecified mechanism (CMS/HCC) was also pertinent to this visit.    Disposition:  Admit 12/11/2019  5:08 PM  Telemetry Bed?: Yes  Patient Class: Inpatient [1]  Level of Care:  Intermediate/Stepdown [8]  Admission Diagnosis: Hypertensive emergency [061320]  Admitting Physician: FLOR WYATT [[id number]]  Is this a telephone or verbal order?: This is a telepho  ne order from the admitting physician  ICU Unit/Requested Floor: step down    The patient was admitted to intermediate floor under the care of Dr. Wyatt, in improved and stable condition. Dr. Stroud, neurology on consult.    Patient seen under the supervision of Dr. Cheek  Signed:     Tami Rodriguez PA-C  12/11/19 2011

## 2025-04-14 NOTE — CT
PROCEDURE:  CT Abdomen and Pelvis without Oral or IV contrast.



HISTORY:

distended abdomen and pain



COMPARISON:

Obstructive series performed 1/10/19



TECHNIQUE:

Contiguous axial images of the abdomen and pelvis. No oral or IV 

contrast administered. Coronal and Sagittal reformats generated and 

reviewed. 



Radiation dose:



Total exam DLP = 913.57 mGy-cm.



This CT exam was performed using one or more of the following dose 

reduction techniques: Automated exposure control, adjustment of the 

mA and/or kV according to patient size, and/or use of iterative 

reconstruction technique.



FINDINGS:

There is limited evaluation of the solid organs without the 

administration of IV contrast.



LOWER THORAX:

No visible consolidation, pleural effusion, or pneumothorax.



Cardiomegaly. Dense coronary artery calcifications. Dense mitral 

annulus calcifications. 



Small hiatal hernia/distal esophageal wall thickening. 



LIVER:

Heterogeneous low-density within the left hepatic lobe consistent 

with focal biliary ductal dilatation. 



GALLBLADDER AND BILE DUCTS:

Unremarkable. 



PANCREAS:

Diffuse fatty infiltration of the pancreas. Subtle inflammatory 

changes cannot be excluded. Recommend correlation with amylase and 

lipase in order to exclude possibility of acute pancreatitis.



SPLEEN:

Unremarkable. 



ADRENALS:

Unremarkable. 



KIDNEYS AND URETERS:

Atrophic bilateral native kidneys. 9 mm right renal hypodensity, too 

small to characterize. Right renal cyst. Nonobstructing left renal 

calculi. Right lower quadrant transplant kidney with 3 mm 

nonobstructing calculus. 



BLADDER:

The urinary bladder appears unremarkable.



REPRODUCTIVE:

Uterus is present. 



APPENDIX:

The appendix appears within normal limits of caliber. No secondary 

signs of acute appendicitis.



BOWEL:

The stomach is nondistended. 



Lack of oral contrast limits evaluation for bowel pathology.   The 

bowel loops appear within normal limits of caliber without evidence 

of intestinal obstruction. Diverticulosis without CT evidence of 

acute diverticulitis. 



PERITONEUM:

No significant free fluid. No definite free air.



LYMPH NODES:

No bulky lymphadenopathy identified.



VASCULATURE:

Atherosclerotic calcifications of the aorta and branches. No aortic 

aneurysm. 



BONES:

Spinal stimulator with dorsal thoracic spine leads. Posterior lumbar 

fusion hardware. Scoliosis. 



OTHER FINDINGS:

Left inguinal hernia containing fat and soft tissue presumed to 

represent the broad ligament. Large right lower abdominal bowel 

containing hernia without evidence of obstruction. 



IMPRESSION:

Heterogeneous low-density within the left hepatic lobe consistent 

with focal biliary ductal dilatation. Etiology of this finding 

uncertain. 



Diffuse fatty infiltration of the pancreas. Subtle inflammatory 

changes cannot be excluded. Recommend correlation with amylase and 

lipase in order to exclude possibility of acute pancreatitis.



Large right lower abdominal bowel containing hernia without evidence 

of obstruction. Left inguinal hernia containing fat and soft tissue 

presumed to represent a broad ligament. 



Additional findings as above. 



Preliminary impression was provided by [facility]. Study marked for PA 

review. [email or web address]  Received: 6 days ago  Dipti Lucas [facility] Nurse Msg Pool  Orencia 125MG/ML syringes  - Out going call to patient     Who: Wong (Patient)     Purpose of the call: Called patient to follow up on his PAP application. Did he call [employer] with his proof of income? Has there been any additional information? Anew test claim was made and a refill too soon was given. Confirm with patient if he is filling the medication and if he is where is he filling?     Dipti Lucas 4/8/25